# Patient Record
Sex: FEMALE | Race: WHITE | Employment: OTHER | URBAN - METROPOLITAN AREA
[De-identification: names, ages, dates, MRNs, and addresses within clinical notes are randomized per-mention and may not be internally consistent; named-entity substitution may affect disease eponyms.]

---

## 2016-01-01 LAB — HGBA1C: 10 % (ref ?–5.7)

## 2017-01-01 ENCOUNTER — PATIENT OUTREACH (OUTPATIENT)
Dept: CASE MANAGEMENT | Age: 52
End: 2017-01-01

## 2017-01-01 ENCOUNTER — TELEPHONE (OUTPATIENT)
Dept: ENDOCRINOLOGY CLINIC | Facility: CLINIC | Age: 52
End: 2017-01-01

## 2017-01-01 ENCOUNTER — TELEPHONE (OUTPATIENT)
Dept: HEMATOLOGY/ONCOLOGY | Facility: HOSPITAL | Age: 52
End: 2017-01-01

## 2017-01-01 ENCOUNTER — TELEPHONE (OUTPATIENT)
Dept: FAMILY MEDICINE CLINIC | Facility: CLINIC | Age: 52
End: 2017-01-01

## 2017-01-01 ENCOUNTER — APPOINTMENT (OUTPATIENT)
Dept: CT IMAGING | Facility: HOSPITAL | Age: 52
DRG: 375 | End: 2017-01-01
Attending: EMERGENCY MEDICINE
Payer: COMMERCIAL

## 2017-01-01 ENCOUNTER — OFFICE VISIT (OUTPATIENT)
Dept: HEMATOLOGY/ONCOLOGY | Facility: HOSPITAL | Age: 52
End: 2017-01-01
Attending: INTERNAL MEDICINE
Payer: COMMERCIAL

## 2017-01-01 ENCOUNTER — APPOINTMENT (OUTPATIENT)
Dept: MRI IMAGING | Facility: HOSPITAL | Age: 52
DRG: 375 | End: 2017-01-01
Attending: Other
Payer: COMMERCIAL

## 2017-01-01 ENCOUNTER — APPOINTMENT (OUTPATIENT)
Dept: CT IMAGING | Facility: HOSPITAL | Age: 52
DRG: 689 | End: 2017-01-01
Attending: HOSPITALIST
Payer: COMMERCIAL

## 2017-01-01 ENCOUNTER — OFFICE VISIT (OUTPATIENT)
Dept: WOUND CARE | Age: 52
End: 2017-01-01
Attending: NURSE PRACTITIONER
Payer: COMMERCIAL

## 2017-01-01 ENCOUNTER — NURSE ONLY (OUTPATIENT)
Dept: HEMATOLOGY/ONCOLOGY | Age: 52
End: 2017-01-01
Attending: INTERNAL MEDICINE
Payer: COMMERCIAL

## 2017-01-01 ENCOUNTER — TELEPHONE (OUTPATIENT)
Dept: INTERNAL MEDICINE CLINIC | Facility: CLINIC | Age: 52
End: 2017-01-01

## 2017-01-01 ENCOUNTER — APPOINTMENT (OUTPATIENT)
Dept: ULTRASOUND IMAGING | Facility: HOSPITAL | Age: 52
DRG: 689 | End: 2017-01-01
Attending: INTERNAL MEDICINE
Payer: COMMERCIAL

## 2017-01-01 ENCOUNTER — OFFICE VISIT (OUTPATIENT)
Dept: ENDOCRINOLOGY CLINIC | Facility: CLINIC | Age: 52
End: 2017-01-01

## 2017-01-01 ENCOUNTER — SNF VISIT (OUTPATIENT)
Dept: INTERNAL MEDICINE CLINIC | Age: 52
End: 2017-01-01

## 2017-01-01 ENCOUNTER — OFFICE VISIT (OUTPATIENT)
Dept: HEMATOLOGY/ONCOLOGY | Age: 52
End: 2017-01-01
Attending: INTERNAL MEDICINE
Payer: COMMERCIAL

## 2017-01-01 ENCOUNTER — APPOINTMENT (OUTPATIENT)
Dept: GENERAL RADIOLOGY | Facility: HOSPITAL | Age: 52
DRG: 754 | End: 2017-01-01
Attending: EMERGENCY MEDICINE
Payer: COMMERCIAL

## 2017-01-01 ENCOUNTER — APPOINTMENT (OUTPATIENT)
Dept: GENERAL RADIOLOGY | Facility: HOSPITAL | Age: 52
DRG: 689 | End: 2017-01-01
Attending: HOSPITALIST
Payer: COMMERCIAL

## 2017-01-01 ENCOUNTER — SOCIAL WORK SERVICES (OUTPATIENT)
Dept: HEMATOLOGY/ONCOLOGY | Facility: HOSPITAL | Age: 52
End: 2017-01-01

## 2017-01-01 ENCOUNTER — ANESTHESIA EVENT (OUTPATIENT)
Dept: SURGERY | Facility: HOSPITAL | Age: 52
End: 2017-01-01

## 2017-01-01 ENCOUNTER — LAB ENCOUNTER (OUTPATIENT)
Dept: LAB | Age: 52
End: 2017-01-01
Attending: FAMILY MEDICINE
Payer: COMMERCIAL

## 2017-01-01 ENCOUNTER — HOSPITAL ENCOUNTER (INPATIENT)
Facility: HOSPITAL | Age: 52
LOS: 3 days | Discharge: HOME | DRG: 638 | End: 2017-01-01
Attending: STUDENT IN AN ORGANIZED HEALTH CARE EDUCATION/TRAINING PROGRAM | Admitting: STUDENT IN AN ORGANIZED HEALTH CARE EDUCATION/TRAINING PROGRAM
Payer: COMMERCIAL

## 2017-01-01 ENCOUNTER — HOSPITAL ENCOUNTER (INPATIENT)
Facility: HOSPITAL | Age: 52
LOS: 2 days | Discharge: HOME | DRG: 638 | End: 2017-01-01
Attending: EMERGENCY MEDICINE | Admitting: INTERNAL MEDICINE
Payer: COMMERCIAL

## 2017-01-01 ENCOUNTER — APPOINTMENT (OUTPATIENT)
Dept: GENETICS | Facility: HOSPITAL | Age: 52
End: 2017-01-01
Attending: INTERNAL MEDICINE
Payer: COMMERCIAL

## 2017-01-01 ENCOUNTER — NURSE ONLY (OUTPATIENT)
Dept: ENDOCRINOLOGY CLINIC | Facility: CLINIC | Age: 52
End: 2017-01-01

## 2017-01-01 ENCOUNTER — OFFICE VISIT (OUTPATIENT)
Dept: FAMILY MEDICINE CLINIC | Facility: CLINIC | Age: 52
End: 2017-01-01

## 2017-01-01 ENCOUNTER — APPOINTMENT (OUTPATIENT)
Dept: GENERAL RADIOLOGY | Age: 52
DRG: 603 | End: 2017-01-01
Attending: EMERGENCY MEDICINE
Payer: COMMERCIAL

## 2017-01-01 ENCOUNTER — APPOINTMENT (OUTPATIENT)
Dept: ULTRASOUND IMAGING | Facility: HOSPITAL | Age: 52
DRG: 375 | End: 2017-01-01
Attending: INTERNAL MEDICINE
Payer: COMMERCIAL

## 2017-01-01 ENCOUNTER — HOSPITAL ENCOUNTER (INPATIENT)
Facility: HOSPITAL | Age: 52
LOS: 10 days | Discharge: NURSING, REHAB OR LONG-TERM CARE | DRG: 375 | End: 2017-01-01
Attending: EMERGENCY MEDICINE | Admitting: HOSPITALIST
Payer: COMMERCIAL

## 2017-01-01 ENCOUNTER — APPOINTMENT (OUTPATIENT)
Dept: WOUND CARE | Facility: HOSPITAL | Age: 52
End: 2017-01-01
Attending: PODIATRIST
Payer: COMMERCIAL

## 2017-01-01 ENCOUNTER — GENETICS ENCOUNTER (OUTPATIENT)
Dept: HEMATOLOGY/ONCOLOGY | Facility: HOSPITAL | Age: 52
End: 2017-01-01

## 2017-01-01 ENCOUNTER — APPOINTMENT (OUTPATIENT)
Dept: MRI IMAGING | Facility: HOSPITAL | Age: 52
DRG: 638 | End: 2017-01-01
Attending: STUDENT IN AN ORGANIZED HEALTH CARE EDUCATION/TRAINING PROGRAM
Payer: COMMERCIAL

## 2017-01-01 ENCOUNTER — APPOINTMENT (OUTPATIENT)
Dept: ULTRASOUND IMAGING | Facility: HOSPITAL | Age: 52
DRG: 375 | End: 2017-01-01
Attending: HOSPITALIST
Payer: COMMERCIAL

## 2017-01-01 ENCOUNTER — HOSPITAL ENCOUNTER (INPATIENT)
Facility: HOSPITAL | Age: 52
LOS: 1 days | DRG: 754 | End: 2017-01-01
Attending: INTERNAL MEDICINE | Admitting: INTERNAL MEDICINE
Payer: OTHER MISCELLANEOUS

## 2017-01-01 ENCOUNTER — NURSE ONLY (OUTPATIENT)
Dept: LAB | Age: 52
End: 2017-01-01
Attending: FAMILY MEDICINE
Payer: COMMERCIAL

## 2017-01-01 ENCOUNTER — APPOINTMENT (OUTPATIENT)
Dept: GENERAL RADIOLOGY | Facility: HOSPITAL | Age: 52
DRG: 638 | End: 2017-01-01
Attending: EMERGENCY MEDICINE
Payer: COMMERCIAL

## 2017-01-01 ENCOUNTER — HOSPITAL ENCOUNTER (OUTPATIENT)
Dept: CT IMAGING | Age: 52
Discharge: HOME | End: 2017-01-01
Attending: CLINICAL NURSE SPECIALIST
Payer: COMMERCIAL

## 2017-01-01 ENCOUNTER — SNF ADMIT/H&P (OUTPATIENT)
Dept: FAMILY MEDICINE CLINIC | Facility: CLINIC | Age: 52
End: 2017-01-01

## 2017-01-01 ENCOUNTER — SURGERY (OUTPATIENT)
Age: 52
End: 2017-01-01

## 2017-01-01 ENCOUNTER — HOSPITAL ENCOUNTER (INPATIENT)
Facility: HOSPITAL | Age: 52
LOS: 1 days | Discharge: HOSPICE | DRG: 754 | End: 2017-01-01
Attending: EMERGENCY MEDICINE | Admitting: INTERNAL MEDICINE
Payer: COMMERCIAL

## 2017-01-01 ENCOUNTER — APPOINTMENT (OUTPATIENT)
Dept: GENERAL RADIOLOGY | Facility: HOSPITAL | Age: 52
DRG: 689 | End: 2017-01-01
Attending: EMERGENCY MEDICINE
Payer: COMMERCIAL

## 2017-01-01 ENCOUNTER — TELEPHONE (OUTPATIENT)
Dept: INFECTIOUS DISEASE | Facility: CLINIC | Age: 52
End: 2017-01-01

## 2017-01-01 ENCOUNTER — APPOINTMENT (OUTPATIENT)
Dept: ULTRASOUND IMAGING | Facility: HOSPITAL | Age: 52
DRG: 689 | End: 2017-01-01
Attending: SURGERY
Payer: COMMERCIAL

## 2017-01-01 ENCOUNTER — APPOINTMENT (OUTPATIENT)
Dept: ULTRASOUND IMAGING | Facility: HOSPITAL | Age: 52
DRG: 689 | End: 2017-01-01
Attending: PHYSICIAN ASSISTANT
Payer: COMMERCIAL

## 2017-01-01 ENCOUNTER — ANESTHESIA (OUTPATIENT)
Dept: SURGERY | Facility: HOSPITAL | Age: 52
End: 2017-01-01

## 2017-01-01 ENCOUNTER — HOSPITAL ENCOUNTER (INPATIENT)
Facility: HOSPITAL | Age: 52
LOS: 1 days | Discharge: HOME | DRG: 603 | End: 2017-01-01
Attending: EMERGENCY MEDICINE | Admitting: HOSPITALIST
Payer: COMMERCIAL

## 2017-01-01 ENCOUNTER — APPOINTMENT (OUTPATIENT)
Dept: CT IMAGING | Facility: HOSPITAL | Age: 52
DRG: 638 | End: 2017-01-01
Attending: EMERGENCY MEDICINE
Payer: COMMERCIAL

## 2017-01-01 ENCOUNTER — APPOINTMENT (OUTPATIENT)
Dept: HEMATOLOGY/ONCOLOGY | Age: 52
End: 2017-01-01
Attending: INTERNAL MEDICINE
Payer: COMMERCIAL

## 2017-01-01 ENCOUNTER — HOSPITAL ENCOUNTER (INPATIENT)
Facility: HOSPITAL | Age: 52
LOS: 5 days | Discharge: NURSING, REHAB OR LONG-TERM CARE | DRG: 689 | End: 2017-01-01
Attending: EMERGENCY MEDICINE | Admitting: HOSPITALIST
Payer: COMMERCIAL

## 2017-01-01 VITALS
SYSTOLIC BLOOD PRESSURE: 96 MMHG | DIASTOLIC BLOOD PRESSURE: 59 MMHG | RESPIRATION RATE: 16 BRPM | OXYGEN SATURATION: 97 % | TEMPERATURE: 99 F | HEART RATE: 91 BPM

## 2017-01-01 VITALS
SYSTOLIC BLOOD PRESSURE: 136 MMHG | HEART RATE: 88 BPM | BODY MASS INDEX: 40.57 KG/M2 | DIASTOLIC BLOOD PRESSURE: 78 MMHG | WEIGHT: 229 LBS | HEIGHT: 63 IN

## 2017-01-01 VITALS
OXYGEN SATURATION: 98 % | DIASTOLIC BLOOD PRESSURE: 78 MMHG | BODY MASS INDEX: 35 KG/M2 | RESPIRATION RATE: 20 BRPM | SYSTOLIC BLOOD PRESSURE: 127 MMHG | HEART RATE: 91 BPM | TEMPERATURE: 98 F | WEIGHT: 200 LBS

## 2017-01-01 VITALS
HEIGHT: 63 IN | TEMPERATURE: 99 F | WEIGHT: 211.88 LBS | RESPIRATION RATE: 20 BRPM | OXYGEN SATURATION: 99 % | SYSTOLIC BLOOD PRESSURE: 131 MMHG | DIASTOLIC BLOOD PRESSURE: 74 MMHG | HEART RATE: 102 BPM | BODY MASS INDEX: 37.54 KG/M2

## 2017-01-01 VITALS
WEIGHT: 203.88 LBS | DIASTOLIC BLOOD PRESSURE: 75 MMHG | DIASTOLIC BLOOD PRESSURE: 72 MMHG | HEART RATE: 87 BPM | WEIGHT: 202.63 LBS | BODY MASS INDEX: 36.12 KG/M2 | RESPIRATION RATE: 18 BRPM | TEMPERATURE: 98 F | TEMPERATURE: 99 F | OXYGEN SATURATION: 100 % | SYSTOLIC BLOOD PRESSURE: 121 MMHG | HEIGHT: 63 IN | RESPIRATION RATE: 20 BRPM | BODY MASS INDEX: 37 KG/M2 | HEART RATE: 109 BPM | OXYGEN SATURATION: 97 % | SYSTOLIC BLOOD PRESSURE: 134 MMHG

## 2017-01-01 VITALS
DIASTOLIC BLOOD PRESSURE: 67 MMHG | OXYGEN SATURATION: 96 % | RESPIRATION RATE: 20 BRPM | SYSTOLIC BLOOD PRESSURE: 113 MMHG | HEART RATE: 97 BPM | TEMPERATURE: 98 F

## 2017-01-01 VITALS
DIASTOLIC BLOOD PRESSURE: 60 MMHG | BODY MASS INDEX: 34.02 KG/M2 | HEIGHT: 63 IN | WEIGHT: 192 LBS | SYSTOLIC BLOOD PRESSURE: 141 MMHG | TEMPERATURE: 97 F | HEART RATE: 107 BPM

## 2017-01-01 VITALS
BODY MASS INDEX: 35.61 KG/M2 | DIASTOLIC BLOOD PRESSURE: 62 MMHG | TEMPERATURE: 98 F | HEIGHT: 63 IN | RESPIRATION RATE: 16 BRPM | HEART RATE: 72 BPM | SYSTOLIC BLOOD PRESSURE: 110 MMHG | WEIGHT: 201 LBS

## 2017-01-01 VITALS
SYSTOLIC BLOOD PRESSURE: 110 MMHG | BODY MASS INDEX: 38.62 KG/M2 | DIASTOLIC BLOOD PRESSURE: 60 MMHG | HEART RATE: 72 BPM | HEIGHT: 63 IN | TEMPERATURE: 98 F | WEIGHT: 218 LBS | RESPIRATION RATE: 18 BRPM

## 2017-01-01 VITALS — DIASTOLIC BLOOD PRESSURE: 74 MMHG | WEIGHT: 208 LBS | SYSTOLIC BLOOD PRESSURE: 130 MMHG | BODY MASS INDEX: 37 KG/M2

## 2017-01-01 VITALS
TEMPERATURE: 99 F | RESPIRATION RATE: 18 BRPM | HEIGHT: 62.99 IN | DIASTOLIC BLOOD PRESSURE: 75 MMHG | HEART RATE: 99 BPM | SYSTOLIC BLOOD PRESSURE: 109 MMHG | BODY MASS INDEX: 39.45 KG/M2 | OXYGEN SATURATION: 98 % | WEIGHT: 222.63 LBS

## 2017-01-01 VITALS
HEART RATE: 51 BPM | DIASTOLIC BLOOD PRESSURE: 68 MMHG | BODY MASS INDEX: 41.59 KG/M2 | SYSTOLIC BLOOD PRESSURE: 128 MMHG | OXYGEN SATURATION: 96 % | HEIGHT: 62 IN | WEIGHT: 226 LBS | RESPIRATION RATE: 16 BRPM

## 2017-01-01 VITALS
RESPIRATION RATE: 18 BRPM | TEMPERATURE: 98 F | WEIGHT: 226 LBS | DIASTOLIC BLOOD PRESSURE: 50 MMHG | HEART RATE: 68 BPM | HEIGHT: 63 IN | BODY MASS INDEX: 40.04 KG/M2 | SYSTOLIC BLOOD PRESSURE: 90 MMHG

## 2017-01-01 VITALS
BODY MASS INDEX: 39.93 KG/M2 | OXYGEN SATURATION: 99 % | DIASTOLIC BLOOD PRESSURE: 82 MMHG | WEIGHT: 225.38 LBS | TEMPERATURE: 99 F | RESPIRATION RATE: 20 BRPM | SYSTOLIC BLOOD PRESSURE: 129 MMHG | HEART RATE: 105 BPM | HEIGHT: 62.99 IN

## 2017-01-01 VITALS — WEIGHT: 205.81 LBS | BODY MASS INDEX: 36 KG/M2

## 2017-01-01 VITALS
WEIGHT: 206 LBS | DIASTOLIC BLOOD PRESSURE: 53 MMHG | OXYGEN SATURATION: 93 % | HEART RATE: 97 BPM | BODY MASS INDEX: 36 KG/M2 | SYSTOLIC BLOOD PRESSURE: 112 MMHG

## 2017-01-01 VITALS
TEMPERATURE: 98 F | DIASTOLIC BLOOD PRESSURE: 60 MMHG | WEIGHT: 228 LBS | HEIGHT: 63 IN | HEART RATE: 68 BPM | SYSTOLIC BLOOD PRESSURE: 122 MMHG | BODY MASS INDEX: 40.4 KG/M2 | RESPIRATION RATE: 18 BRPM

## 2017-01-01 VITALS
TEMPERATURE: 99 F | HEIGHT: 63 IN | WEIGHT: 219 LBS | RESPIRATION RATE: 18 BRPM | HEART RATE: 68 BPM | DIASTOLIC BLOOD PRESSURE: 50 MMHG | SYSTOLIC BLOOD PRESSURE: 110 MMHG | BODY MASS INDEX: 38.8 KG/M2

## 2017-01-01 VITALS
WEIGHT: 209 LBS | BODY MASS INDEX: 37.03 KG/M2 | DIASTOLIC BLOOD PRESSURE: 66 MMHG | RESPIRATION RATE: 16 BRPM | HEIGHT: 63 IN | HEART RATE: 105 BPM | SYSTOLIC BLOOD PRESSURE: 119 MMHG | TEMPERATURE: 98 F

## 2017-01-01 VITALS
DIASTOLIC BLOOD PRESSURE: 81 MMHG | RESPIRATION RATE: 20 BRPM | TEMPERATURE: 98 F | WEIGHT: 224 LBS | BODY MASS INDEX: 40 KG/M2 | SYSTOLIC BLOOD PRESSURE: 141 MMHG | HEART RATE: 103 BPM

## 2017-01-01 VITALS
BODY MASS INDEX: 37 KG/M2 | WEIGHT: 199.63 LBS | TEMPERATURE: 98 F | OXYGEN SATURATION: 98 % | RESPIRATION RATE: 2 BRPM | HEART RATE: 103 BPM | DIASTOLIC BLOOD PRESSURE: 79 MMHG | SYSTOLIC BLOOD PRESSURE: 135 MMHG

## 2017-01-01 VITALS
RESPIRATION RATE: 18 BRPM | TEMPERATURE: 98 F | HEART RATE: 95 BPM | WEIGHT: 209.63 LBS | OXYGEN SATURATION: 90 % | BODY MASS INDEX: 37.14 KG/M2 | DIASTOLIC BLOOD PRESSURE: 60 MMHG | HEIGHT: 63 IN | SYSTOLIC BLOOD PRESSURE: 99 MMHG

## 2017-01-01 VITALS
WEIGHT: 204.38 LBS | HEART RATE: 111 BPM | TEMPERATURE: 98 F | RESPIRATION RATE: 20 BRPM | SYSTOLIC BLOOD PRESSURE: 129 MMHG | DIASTOLIC BLOOD PRESSURE: 70 MMHG | OXYGEN SATURATION: 98 % | BODY MASS INDEX: 36.21 KG/M2 | HEIGHT: 63 IN

## 2017-01-01 VITALS
OXYGEN SATURATION: 98 % | RESPIRATION RATE: 20 BRPM | DIASTOLIC BLOOD PRESSURE: 83 MMHG | BODY MASS INDEX: 39 KG/M2 | SYSTOLIC BLOOD PRESSURE: 145 MMHG | HEART RATE: 80 BPM | TEMPERATURE: 98 F | WEIGHT: 221.63 LBS

## 2017-01-01 VITALS
HEART RATE: 94 BPM | OXYGEN SATURATION: 95 % | WEIGHT: 208.63 LBS | RESPIRATION RATE: 18 BRPM | DIASTOLIC BLOOD PRESSURE: 49 MMHG | TEMPERATURE: 98 F | BODY MASS INDEX: 36.96 KG/M2 | SYSTOLIC BLOOD PRESSURE: 92 MMHG | HEIGHT: 63 IN

## 2017-01-01 VITALS
TEMPERATURE: 99 F | HEART RATE: 103 BPM | HEIGHT: 63 IN | BODY MASS INDEX: 36.18 KG/M2 | WEIGHT: 204.19 LBS | DIASTOLIC BLOOD PRESSURE: 74 MMHG | SYSTOLIC BLOOD PRESSURE: 122 MMHG

## 2017-01-01 VITALS
HEART RATE: 98 BPM | SYSTOLIC BLOOD PRESSURE: 89 MMHG | HEIGHT: 64 IN | RESPIRATION RATE: 18 BRPM | TEMPERATURE: 98 F | DIASTOLIC BLOOD PRESSURE: 51 MMHG | BODY MASS INDEX: 35.87 KG/M2 | OXYGEN SATURATION: 92 % | WEIGHT: 210.13 LBS

## 2017-01-01 VITALS
DIASTOLIC BLOOD PRESSURE: 75 MMHG | SYSTOLIC BLOOD PRESSURE: 121 MMHG | HEART RATE: 94 BPM | BODY MASS INDEX: 40 KG/M2 | WEIGHT: 225.38 LBS | TEMPERATURE: 99 F | RESPIRATION RATE: 18 BRPM

## 2017-01-01 VITALS
WEIGHT: 219 LBS | DIASTOLIC BLOOD PRESSURE: 80 MMHG | RESPIRATION RATE: 18 BRPM | TEMPERATURE: 98 F | OXYGEN SATURATION: 99 % | SYSTOLIC BLOOD PRESSURE: 140 MMHG | BODY MASS INDEX: 39 KG/M2 | HEART RATE: 78 BPM

## 2017-01-01 VITALS
SYSTOLIC BLOOD PRESSURE: 128 MMHG | BODY MASS INDEX: 37 KG/M2 | DIASTOLIC BLOOD PRESSURE: 77 MMHG | OXYGEN SATURATION: 98 % | HEART RATE: 101 BPM | RESPIRATION RATE: 20 BRPM | TEMPERATURE: 99 F | WEIGHT: 209.5 LBS

## 2017-01-01 VITALS — WEIGHT: 210 LBS | BODY MASS INDEX: 37.21 KG/M2 | HEIGHT: 63 IN

## 2017-01-01 VITALS
TEMPERATURE: 97 F | HEART RATE: 87 BPM | RESPIRATION RATE: 20 BRPM | SYSTOLIC BLOOD PRESSURE: 108 MMHG | WEIGHT: 226.38 LBS | BODY MASS INDEX: 41 KG/M2 | DIASTOLIC BLOOD PRESSURE: 75 MMHG

## 2017-01-01 VITALS
WEIGHT: 204 LBS | BODY MASS INDEX: 36.14 KG/M2 | HEIGHT: 63 IN | HEART RATE: 96 BPM | TEMPERATURE: 99 F | DIASTOLIC BLOOD PRESSURE: 50 MMHG | RESPIRATION RATE: 18 BRPM | SYSTOLIC BLOOD PRESSURE: 102 MMHG

## 2017-01-01 VITALS
HEART RATE: 102 BPM | OXYGEN SATURATION: 90 % | SYSTOLIC BLOOD PRESSURE: 71 MMHG | RESPIRATION RATE: 19 BRPM | DIASTOLIC BLOOD PRESSURE: 46 MMHG | TEMPERATURE: 98 F

## 2017-01-01 VITALS — DIASTOLIC BLOOD PRESSURE: 62 MMHG | WEIGHT: 231.5 LBS | BODY MASS INDEX: 42 KG/M2 | SYSTOLIC BLOOD PRESSURE: 140 MMHG

## 2017-01-01 VITALS
HEIGHT: 63 IN | RESPIRATION RATE: 18 BRPM | WEIGHT: 210.81 LBS | HEART RATE: 86 BPM | BODY MASS INDEX: 37.35 KG/M2 | SYSTOLIC BLOOD PRESSURE: 130 MMHG | DIASTOLIC BLOOD PRESSURE: 72 MMHG

## 2017-01-01 VITALS
SYSTOLIC BLOOD PRESSURE: 100 MMHG | DIASTOLIC BLOOD PRESSURE: 64 MMHG | OXYGEN SATURATION: 98 % | HEART RATE: 100 BPM | RESPIRATION RATE: 22 BRPM

## 2017-01-01 VITALS
HEART RATE: 88 BPM | DIASTOLIC BLOOD PRESSURE: 70 MMHG | BODY MASS INDEX: 39.49 KG/M2 | SYSTOLIC BLOOD PRESSURE: 120 MMHG | WEIGHT: 214.63 LBS | HEIGHT: 62 IN

## 2017-01-01 VITALS
RESPIRATION RATE: 20 BRPM | OXYGEN SATURATION: 98 % | WEIGHT: 212 LBS | SYSTOLIC BLOOD PRESSURE: 109 MMHG | DIASTOLIC BLOOD PRESSURE: 67 MMHG | BODY MASS INDEX: 39 KG/M2 | TEMPERATURE: 98 F | HEART RATE: 99 BPM

## 2017-01-01 VITALS
BODY MASS INDEX: 38 KG/M2 | TEMPERATURE: 98 F | RESPIRATION RATE: 18 BRPM | WEIGHT: 211.69 LBS | OXYGEN SATURATION: 97 % | DIASTOLIC BLOOD PRESSURE: 87 MMHG | HEART RATE: 86 BPM | SYSTOLIC BLOOD PRESSURE: 161 MMHG

## 2017-01-01 VITALS
TEMPERATURE: 98 F | SYSTOLIC BLOOD PRESSURE: 110 MMHG | BODY MASS INDEX: 38.46 KG/M2 | HEART RATE: 92 BPM | RESPIRATION RATE: 18 BRPM | WEIGHT: 209 LBS | DIASTOLIC BLOOD PRESSURE: 62 MMHG | HEIGHT: 62 IN

## 2017-01-01 DIAGNOSIS — E11.65 TYPE 2 DIABETES MELLITUS WITH HYPERGLYCEMIA, WITH LONG-TERM CURRENT USE OF INSULIN (HCC): Primary | ICD-10-CM

## 2017-01-01 DIAGNOSIS — C78.7 LIVER METASTASES (HCC): Primary | ICD-10-CM

## 2017-01-01 DIAGNOSIS — Z79.4 TYPE 2 DIABETES MELLITUS WITH BOTH EYES AFFECTED BY MODERATE NONPROLIFERATIVE RETINOPATHY WITHOUT MACULAR EDEMA, WITH LONG-TERM CURRENT USE OF INSULIN (HCC): Primary | ICD-10-CM

## 2017-01-01 DIAGNOSIS — L03.119 TYPE 2 DIABETES, UNCONTROLLED, WITH CELLULITIS OF FOOT (HCC): ICD-10-CM

## 2017-01-01 DIAGNOSIS — Z79.4 TYPE 2 DIABETES MELLITUS WITH BOTH EYES AFFECTED BY MODERATE NONPROLIFERATIVE RETINOPATHY WITHOUT MACULAR EDEMA, WITH LONG-TERM CURRENT USE OF INSULIN (HCC): ICD-10-CM

## 2017-01-01 DIAGNOSIS — Z96.41 INSULIN PUMP IN PLACE: ICD-10-CM

## 2017-01-01 DIAGNOSIS — C78.6 PERITONEAL METASTASES (HCC): ICD-10-CM

## 2017-01-01 DIAGNOSIS — L03.116 CELLULITIS OF LEFT LOWER EXTREMITY: Primary | ICD-10-CM

## 2017-01-01 DIAGNOSIS — Z71.89 GOALS OF CARE, COUNSELING/DISCUSSION: ICD-10-CM

## 2017-01-01 DIAGNOSIS — E11.42 DIABETIC PERIPHERAL NEUROPATHY (HCC): ICD-10-CM

## 2017-01-01 DIAGNOSIS — E11.3393 TYPE 2 DIABETES MELLITUS WITH BOTH EYES AFFECTED BY MODERATE NONPROLIFERATIVE RETINOPATHY WITHOUT MACULAR EDEMA, WITH LONG-TERM CURRENT USE OF INSULIN (HCC): ICD-10-CM

## 2017-01-01 DIAGNOSIS — E11.9 DM (DIABETES MELLITUS) (HCC): Primary | ICD-10-CM

## 2017-01-01 DIAGNOSIS — E78.2 MIXED HYPERLIPIDEMIA: ICD-10-CM

## 2017-01-01 DIAGNOSIS — E78.6 LOW HDL (UNDER 40): ICD-10-CM

## 2017-01-01 DIAGNOSIS — E11.65 TYPE 2 DIABETES, UNCONTROLLED, WITH CELLULITIS OF FOOT (HCC): Primary | ICD-10-CM

## 2017-01-01 DIAGNOSIS — S91.301S UNSPECIFIED OPEN WOUND, RIGHT FOOT, SEQUELA: Primary | ICD-10-CM

## 2017-01-01 DIAGNOSIS — T45.1X5A CHEMOTHERAPY-INDUCED PERIPHERAL NEUROPATHY (HCC): ICD-10-CM

## 2017-01-01 DIAGNOSIS — Q23.1 BICUSPID AORTIC VALVE: ICD-10-CM

## 2017-01-01 DIAGNOSIS — C78.7 LIVER METASTASES (HCC): ICD-10-CM

## 2017-01-01 DIAGNOSIS — E22.2 SIADH (SYNDROME OF INAPPROPRIATE ADH PRODUCTION) (HCC): ICD-10-CM

## 2017-01-01 DIAGNOSIS — Z15.01 BRCA1 POSITIVE: Primary | ICD-10-CM

## 2017-01-01 DIAGNOSIS — Z66 DNR (DO NOT RESUSCITATE): ICD-10-CM

## 2017-01-01 DIAGNOSIS — T45.1X5A PERIPHERAL NEUROPATHY DUE TO CHEMOTHERAPY (HCC): ICD-10-CM

## 2017-01-01 DIAGNOSIS — C56.9 OVARIAN CANCER, UNSPECIFIED LATERALITY (HCC): ICD-10-CM

## 2017-01-01 DIAGNOSIS — C56.9 OVARIAN CANCER, UNSPECIFIED LATERALITY (HCC): Primary | ICD-10-CM

## 2017-01-01 DIAGNOSIS — E11.628 TYPE 2 DIABETES, UNCONTROLLED, WITH CELLULITIS OF FOOT (HCC): Primary | ICD-10-CM

## 2017-01-01 DIAGNOSIS — T50.905D MEDICATION ADVERSE EFFECT, SUBSEQUENT ENCOUNTER: ICD-10-CM

## 2017-01-01 DIAGNOSIS — I95.9 HYPOTENSION, UNSPECIFIED HYPOTENSION TYPE: ICD-10-CM

## 2017-01-01 DIAGNOSIS — Z90.79 STATUS POST TOTAL ABDOMINAL HYSTERECTOMY AND BILATERAL SALPINGO-OOPHORECTOMY (TAH-BSO): ICD-10-CM

## 2017-01-01 DIAGNOSIS — Z02.9 ENCOUNTERS FOR ADMINISTRATIVE PURPOSE: ICD-10-CM

## 2017-01-01 DIAGNOSIS — E11.628 DIABETES WITH SKIN COMPLICATION (HCC): ICD-10-CM

## 2017-01-01 DIAGNOSIS — D64.9 ANEMIA, UNSPECIFIED TYPE: ICD-10-CM

## 2017-01-01 DIAGNOSIS — E11.628 TYPE 2 DIABETES, UNCONTROLLED, WITH CELLULITIS OF FOOT (HCC): ICD-10-CM

## 2017-01-01 DIAGNOSIS — R53.83 MALAISE AND FATIGUE: ICD-10-CM

## 2017-01-01 DIAGNOSIS — R63.4 UNINTENTIONAL WEIGHT LOSS: ICD-10-CM

## 2017-01-01 DIAGNOSIS — E11.65 TYPE 2 DIABETES, UNCONTROLLED, WITH CELLULITIS OF FOOT (HCC): ICD-10-CM

## 2017-01-01 DIAGNOSIS — E11.8 TYPE 2 DIABETES MELLITUS WITH COMPLICATION, WITH LONG-TERM CURRENT USE OF INSULIN (HCC): ICD-10-CM

## 2017-01-01 DIAGNOSIS — R53.81 MALAISE AND FATIGUE: ICD-10-CM

## 2017-01-01 DIAGNOSIS — Z15.09 BRCA1 POSITIVE: Primary | ICD-10-CM

## 2017-01-01 DIAGNOSIS — R18.0 MALIGNANT ASCITES: ICD-10-CM

## 2017-01-01 DIAGNOSIS — Z15.09 BRCA1 POSITIVE: ICD-10-CM

## 2017-01-01 DIAGNOSIS — E11.3393 TYPE 2 DIABETES MELLITUS WITH BOTH EYES AFFECTED BY MODERATE NONPROLIFERATIVE RETINOPATHY WITHOUT MACULAR EDEMA, WITH LONG-TERM CURRENT USE OF INSULIN (HCC): Primary | ICD-10-CM

## 2017-01-01 DIAGNOSIS — Z15.01 BRCA1 POSITIVE: ICD-10-CM

## 2017-01-01 DIAGNOSIS — N17.9 AKI (ACUTE KIDNEY INJURY) (HCC): ICD-10-CM

## 2017-01-01 DIAGNOSIS — C56.9 OVARIAN CANCER (HCC): Primary | ICD-10-CM

## 2017-01-01 DIAGNOSIS — F41.9 ANXIETY: ICD-10-CM

## 2017-01-01 DIAGNOSIS — Z79.4 TYPE 2 DIABETES MELLITUS WITH HYPERGLYCEMIA, WITH LONG-TERM CURRENT USE OF INSULIN (HCC): ICD-10-CM

## 2017-01-01 DIAGNOSIS — G62.0 CHEMOTHERAPY-INDUCED PERIPHERAL NEUROPATHY (HCC): Primary | ICD-10-CM

## 2017-01-01 DIAGNOSIS — Z79.4 TYPE 2 DIABETES MELLITUS WITH COMPLICATION, WITH LONG-TERM CURRENT USE OF INSULIN (HCC): Primary | ICD-10-CM

## 2017-01-01 DIAGNOSIS — Z15.09 OVARIAN CANCER, BRCA1 POSITIVE, UNSPECIFIED LATERALITY (HCC): ICD-10-CM

## 2017-01-01 DIAGNOSIS — T45.1X5A CHEMOTHERAPY-INDUCED PERIPHERAL NEUROPATHY (HCC): Primary | ICD-10-CM

## 2017-01-01 DIAGNOSIS — C56.9 OVARIAN CANCER, BRCA1 POSITIVE, UNSPECIFIED LATERALITY (HCC): ICD-10-CM

## 2017-01-01 DIAGNOSIS — B35.3 TINEA PEDIS OF LEFT FOOT: ICD-10-CM

## 2017-01-01 DIAGNOSIS — R60.0 LOCALIZED EDEMA: ICD-10-CM

## 2017-01-01 DIAGNOSIS — G62.0 PERIPHERAL NEUROPATHY DUE TO CHEMOTHERAPY (HCC): ICD-10-CM

## 2017-01-01 DIAGNOSIS — E11.10 DIABETIC KETOACIDOSIS WITHOUT COMA ASSOCIATED WITH TYPE 2 DIABETES MELLITUS (HCC): Primary | ICD-10-CM

## 2017-01-01 DIAGNOSIS — T45.1X5D CHEMOTHERAPY ADVERSE REACTION, SUBSEQUENT ENCOUNTER: ICD-10-CM

## 2017-01-01 DIAGNOSIS — E11.3393 MODERATE NONPROLIFERATIVE DIABETIC RETINOPATHY OF BOTH EYES WITHOUT MACULAR EDEMA ASSOCIATED WITH TYPE 2 DIABETES MELLITUS (HCC): Primary | ICD-10-CM

## 2017-01-01 DIAGNOSIS — E11.65 TYPE 2 DIABETES MELLITUS WITH HYPERGLYCEMIA, WITH LONG-TERM CURRENT USE OF INSULIN (HCC): ICD-10-CM

## 2017-01-01 DIAGNOSIS — T45.1X5A CHEMOTHERAPY ADVERSE REACTION, INITIAL ENCOUNTER: ICD-10-CM

## 2017-01-01 DIAGNOSIS — D75.839 THROMBOCYTOSIS: ICD-10-CM

## 2017-01-01 DIAGNOSIS — E86.0 DEHYDRATION: ICD-10-CM

## 2017-01-01 DIAGNOSIS — R35.89 POLYURIA: ICD-10-CM

## 2017-01-01 DIAGNOSIS — E11.3311 MODERATE NONPROLIFERATIVE DIABETIC RETINOPATHY OF RIGHT EYE WITH MACULAR EDEMA ASSOCIATED WITH TYPE 2 DIABETES MELLITUS (HCC): Primary | ICD-10-CM

## 2017-01-01 DIAGNOSIS — C80.0 CARCINOMATOSIS (HCC): ICD-10-CM

## 2017-01-01 DIAGNOSIS — Z85.43 HISTORY OF OVARIAN CANCER: Primary | ICD-10-CM

## 2017-01-01 DIAGNOSIS — G62.9 NEUROPATHY: ICD-10-CM

## 2017-01-01 DIAGNOSIS — E11.8 TYPE 2 DIABETES MELLITUS WITH COMPLICATION, WITH LONG-TERM CURRENT USE OF INSULIN (HCC): Primary | ICD-10-CM

## 2017-01-01 DIAGNOSIS — N39.3 STRESS INCONTINENCE: ICD-10-CM

## 2017-01-01 DIAGNOSIS — Z85.43 HISTORY OF OVARIAN CANCER: ICD-10-CM

## 2017-01-01 DIAGNOSIS — R11.0 NAUSEA: ICD-10-CM

## 2017-01-01 DIAGNOSIS — L03.119 TYPE 2 DIABETES, UNCONTROLLED, WITH CELLULITIS OF FOOT (HCC): Primary | ICD-10-CM

## 2017-01-01 DIAGNOSIS — G62.0 CHEMOTHERAPY-INDUCED PERIPHERAL NEUROPATHY (HCC): ICD-10-CM

## 2017-01-01 DIAGNOSIS — C80.0 CARCINOMATOSIS (HCC): Primary | ICD-10-CM

## 2017-01-01 DIAGNOSIS — Z79.4 TYPE 2 DIABETES MELLITUS WITH COMPLICATION, WITH LONG-TERM CURRENT USE OF INSULIN (HCC): ICD-10-CM

## 2017-01-01 DIAGNOSIS — E63.9 VERY POOR NUTRITION: ICD-10-CM

## 2017-01-01 DIAGNOSIS — N28.9 RENAL INSUFFICIENCY: ICD-10-CM

## 2017-01-01 DIAGNOSIS — C56.9 OVARIAN CARCINOMA, UNSPECIFIED LATERALITY (HCC): ICD-10-CM

## 2017-01-01 DIAGNOSIS — G89.4 CHRONIC PAIN SYNDROME: ICD-10-CM

## 2017-01-01 DIAGNOSIS — E87.1 HYPONATREMIA: ICD-10-CM

## 2017-01-01 DIAGNOSIS — E78.5 DYSLIPIDEMIA: ICD-10-CM

## 2017-01-01 DIAGNOSIS — Z79.4 TYPE 2 DIABETES MELLITUS WITH HYPERGLYCEMIA, WITH LONG-TERM CURRENT USE OF INSULIN (HCC): Primary | ICD-10-CM

## 2017-01-01 DIAGNOSIS — N30.01 ACUTE CYSTITIS WITH HEMATURIA: ICD-10-CM

## 2017-01-01 DIAGNOSIS — I10 BENIGN ESSENTIAL HTN: ICD-10-CM

## 2017-01-01 DIAGNOSIS — E11.42 DIABETIC PERIPHERAL NEUROPATHY (HCC): Primary | ICD-10-CM

## 2017-01-01 DIAGNOSIS — Z90.710 STATUS POST TOTAL ABDOMINAL HYSTERECTOMY AND BILATERAL SALPINGO-OOPHORECTOMY (TAH-BSO): ICD-10-CM

## 2017-01-01 DIAGNOSIS — E66.01 MORBID OBESITY WITH BMI OF 40.0-44.9, ADULT (HCC): ICD-10-CM

## 2017-01-01 DIAGNOSIS — I15.9 SECONDARY HYPERTENSION: ICD-10-CM

## 2017-01-01 DIAGNOSIS — R73.9 HYPERGLYCEMIA: ICD-10-CM

## 2017-01-01 DIAGNOSIS — E86.0 DEHYDRATION: Primary | ICD-10-CM

## 2017-01-01 DIAGNOSIS — R40.0 SOMNOLENCE: ICD-10-CM

## 2017-01-01 DIAGNOSIS — Z90.722 STATUS POST TOTAL ABDOMINAL HYSTERECTOMY AND BILATERAL SALPINGO-OOPHORECTOMY (TAH-BSO): ICD-10-CM

## 2017-01-01 DIAGNOSIS — Z15.01 OVARIAN CANCER, BRCA1 POSITIVE, UNSPECIFIED LATERALITY (HCC): ICD-10-CM

## 2017-01-01 DIAGNOSIS — R10.9 ABDOMINAL PAIN, ACUTE: Primary | ICD-10-CM

## 2017-01-01 DIAGNOSIS — E87.5 HYPERKALEMIA: ICD-10-CM

## 2017-01-01 DIAGNOSIS — L03.116 CELLULITIS OF LEFT LOWER EXTREMITY: ICD-10-CM

## 2017-01-01 DIAGNOSIS — Z51.5 PALLIATIVE CARE ENCOUNTER: ICD-10-CM

## 2017-01-01 DIAGNOSIS — R11.2 NON-INTRACTABLE VOMITING WITH NAUSEA, UNSPECIFIED VOMITING TYPE: Primary | ICD-10-CM

## 2017-01-01 DIAGNOSIS — C56.9 MALIGNANT NEOPLASM OF OVARY, UNSPECIFIED LATERALITY (HCC): Primary | ICD-10-CM

## 2017-01-01 LAB
ALBUMIN SERPL-MCNC: 3.2 G/DL (ref 3.5–4.8)
ALBUMIN SERPL-MCNC: 3.3 G/DL (ref 3.5–4.8)
ALBUMIN SERPL-MCNC: 3.4 G/DL (ref 3.5–4.8)
ALBUMIN SERPL-MCNC: 3.4 G/DL (ref 3.5–4.8)
ALBUMIN SERPL-MCNC: 3.5 G/DL (ref 3.5–4.8)
ALBUMIN SERPL-MCNC: 3.6 G/DL (ref 3.5–4.8)
ALBUMIN SERPL-MCNC: 3.6 G/DL (ref 3.5–4.8)
ALBUMIN SERPL-MCNC: 3.7 G/DL (ref 3.5–4.8)
ALBUMIN SERPL-MCNC: 3.8 G/DL (ref 3.5–4.8)
ALP LIVER SERPL-CCNC: 103 U/L (ref 39–100)
ALP LIVER SERPL-CCNC: 107 U/L (ref 39–100)
ALP LIVER SERPL-CCNC: 130 U/L (ref 41–108)
ALP LIVER SERPL-CCNC: 140 U/L (ref 41–108)
ALP LIVER SERPL-CCNC: 154 U/L (ref 41–108)
ALP LIVER SERPL-CCNC: 170 U/L (ref 41–108)
ALP LIVER SERPL-CCNC: 171 U/L (ref 41–108)
ALP LIVER SERPL-CCNC: 198 U/L (ref 39–100)
ALP LIVER SERPL-CCNC: 96 U/L (ref 41–108)
ALT SERPL-CCNC: 22 U/L (ref 14–54)
ALT SERPL-CCNC: 31 U/L (ref 14–54)
ALT SERPL-CCNC: 31 U/L (ref 14–54)
ALT SERPL-CCNC: 34 U/L (ref 14–54)
ALT SERPL-CCNC: 37 U/L (ref 14–54)
ALT SERPL-CCNC: 43 U/L (ref 14–54)
ALT SERPL-CCNC: 43 U/L (ref 14–54)
ALT SERPL-CCNC: 47 U/L (ref 14–54)
ALT SERPL-CCNC: 55 U/L (ref 14–54)
ANTISTREPTOLYSIN O ANTIBODY: 138 IU/ML
AST SERPL-CCNC: 17 U/L (ref 15–41)
AST SERPL-CCNC: 25 U/L (ref 15–41)
AST SERPL-CCNC: 25 U/L (ref 15–41)
AST SERPL-CCNC: 26 U/L (ref 15–41)
AST SERPL-CCNC: 26 U/L (ref 15–41)
AST SERPL-CCNC: 30 U/L (ref 15–41)
AST SERPL-CCNC: 30 U/L (ref 15–41)
AST SERPL-CCNC: 31 U/L (ref 15–41)
AST SERPL-CCNC: 36 U/L (ref 15–41)
BASOPHILS # BLD AUTO: 0.02 X10(3) UL (ref 0–0.1)
BASOPHILS # BLD AUTO: 0.03 X10(3) UL (ref 0–0.1)
BASOPHILS # BLD AUTO: 0.04 X10(3) UL (ref 0–0.1)
BASOPHILS # BLD AUTO: 0.05 X10(3) UL (ref 0–0.1)
BASOPHILS NFR BLD AUTO: 0.1 %
BASOPHILS NFR BLD AUTO: 0.2 %
BASOPHILS NFR BLD AUTO: 0.3 %
BASOPHILS NFR BLD AUTO: 0.3 %
BASOPHILS NFR BLD AUTO: 0.4 %
BASOPHILS NFR BLD AUTO: 0.6 %
BILIRUB SERPL-MCNC: 0.2 MG/DL (ref 0.1–2)
BILIRUB SERPL-MCNC: 0.3 MG/DL (ref 0.1–2)
BILIRUB SERPL-MCNC: 0.4 MG/DL (ref 0.1–2)
BUN BLD-MCNC: 10 MG/DL (ref 8–20)
BUN BLD-MCNC: 11 MG/DL (ref 8–20)
BUN BLD-MCNC: 15 MG/DL (ref 8–20)
BUN BLD-MCNC: 16 MG/DL (ref 8–20)
BUN BLD-MCNC: 17 MG/DL (ref 8–20)
BUN BLD-MCNC: 17 MG/DL (ref 8–20)
BUN BLD-MCNC: 18 MG/DL (ref 8–20)
BUN BLD-MCNC: 24 MG/DL (ref 8–20)
BUN BLD-MCNC: 26 MG/DL (ref 8–20)
CALCIUM BLD-MCNC: 8.8 MG/DL (ref 8.3–10.3)
CALCIUM BLD-MCNC: 8.8 MG/DL (ref 8.3–10.3)
CALCIUM BLD-MCNC: 9.1 MG/DL (ref 8.3–10.3)
CALCIUM BLD-MCNC: 9.2 MG/DL (ref 8.3–10.3)
CALCIUM BLD-MCNC: 9.2 MG/DL (ref 8.3–10.3)
CALCIUM BLD-MCNC: 9.3 MG/DL (ref 8.3–10.3)
CALCIUM BLD-MCNC: 9.4 MG/DL (ref 8.3–10.3)
CALCIUM BLD-MCNC: 9.5 MG/DL (ref 8.3–10.3)
CALCIUM BLD-MCNC: 9.7 MG/DL (ref 8.3–10.3)
CALCIUM BLD-MCNC: 9.7 MG/DL (ref 8.3–10.3)
CALCIUM BLD-MCNC: 9.8 MG/DL (ref 8.3–10.3)
CANCER AG 125 (CA125): 22.7 U/ML (ref ?–35)
CANCER AG 125 (CA125): 27 U/ML (ref ?–35)
CANCER AG 125 (CA125): 27.2 U/ML (ref ?–35)
CANCER AG 125 (CA125): 28.5 U/ML (ref ?–35)
CANCER AG 125 (CA125): 29.1 U/ML (ref ?–35)
CANCER AG 125 (CA125): 46.3 U/ML (ref ?–35)
CHLORIDE: 102 MMOL/L (ref 101–111)
CHLORIDE: 102 MMOL/L (ref 101–111)
CHLORIDE: 104 MMOL/L (ref 101–111)
CHLORIDE: 105 MMOL/L (ref 101–111)
CHLORIDE: 106 MMOL/L (ref 101–111)
CHLORIDE: 106 MMOL/L (ref 101–111)
CHLORIDE: 109 MMOL/L (ref 101–111)
CHOLEST SMN-MCNC: 169 MG/DL (ref ?–200)
CO2: 23 MMOL/L (ref 22–32)
CO2: 24 MMOL/L (ref 22–32)
CO2: 25 MMOL/L (ref 22–32)
CO2: 26 MMOL/L (ref 22–32)
CO2: 26 MMOL/L (ref 22–32)
CO2: 27 MMOL/L (ref 22–32)
CO2: 27 MMOL/L (ref 22–32)
CREAT BLD-MCNC: 0.5 MG/DL (ref 0.55–1.02)
CREAT BLD-MCNC: 0.69 MG/DL (ref 0.55–1.02)
CREAT BLD-MCNC: 0.73 MG/DL (ref 0.55–1.02)
CREAT BLD-MCNC: 0.73 MG/DL (ref 0.55–1.02)
CREAT BLD-MCNC: 0.76 MG/DL (ref 0.55–1.02)
CREAT BLD-MCNC: 0.78 MG/DL (ref 0.55–1.02)
CREAT BLD-MCNC: 0.86 MG/DL (ref 0.55–1.02)
CREAT BLD-MCNC: 0.88 MG/DL (ref 0.55–1.02)
CREAT BLD-MCNC: 0.89 MG/DL (ref 0.55–1.02)
CREAT BLD-MCNC: 0.9 MG/DL (ref 0.55–1.02)
CREAT BLD-MCNC: 0.99 MG/DL (ref 0.55–1.02)
EOSINOPHIL # BLD AUTO: 0.02 X10(3) UL (ref 0–0.3)
EOSINOPHIL # BLD AUTO: 0.1 X10(3) UL (ref 0–0.3)
EOSINOPHIL # BLD AUTO: 0.1 X10(3) UL (ref 0–0.3)
EOSINOPHIL # BLD AUTO: 0.13 X10(3) UL (ref 0–0.3)
EOSINOPHIL # BLD AUTO: 0.14 X10(3) UL (ref 0–0.3)
EOSINOPHIL # BLD AUTO: 0.14 X10(3) UL (ref 0–0.3)
EOSINOPHIL # BLD AUTO: 0.15 X10(3) UL (ref 0–0.3)
EOSINOPHIL # BLD AUTO: 0.17 X10(3) UL (ref 0–0.3)
EOSINOPHIL # BLD AUTO: 0.19 X10(3) UL (ref 0–0.3)
EOSINOPHIL # BLD AUTO: 0.25 X10(3) UL (ref 0–0.3)
EOSINOPHIL # BLD AUTO: 0.27 X10(3) UL (ref 0–0.3)
EOSINOPHIL NFR BLD AUTO: 0.1 %
EOSINOPHIL NFR BLD AUTO: 0.8 %
EOSINOPHIL NFR BLD AUTO: 1.1 %
EOSINOPHIL NFR BLD AUTO: 1.3 %
EOSINOPHIL NFR BLD AUTO: 1.4 %
EOSINOPHIL NFR BLD AUTO: 1.6 %
EOSINOPHIL NFR BLD AUTO: 1.7 %
EOSINOPHIL NFR BLD AUTO: 1.9 %
EOSINOPHIL NFR BLD AUTO: 2.2 %
EOSINOPHIL NFR BLD AUTO: 2.2 %
EOSINOPHIL NFR BLD AUTO: 3.2 %
ERYTHROCYTE [DISTWIDTH] IN BLOOD BY AUTOMATED COUNT: 15.4 % (ref 11.5–16)
ERYTHROCYTE [DISTWIDTH] IN BLOOD BY AUTOMATED COUNT: 15.5 % (ref 11.5–16)
ERYTHROCYTE [DISTWIDTH] IN BLOOD BY AUTOMATED COUNT: 15.5 % (ref 11.5–16)
ERYTHROCYTE [DISTWIDTH] IN BLOOD BY AUTOMATED COUNT: 15.7 % (ref 11.5–16)
ERYTHROCYTE [DISTWIDTH] IN BLOOD BY AUTOMATED COUNT: 15.8 % (ref 11.5–16)
ERYTHROCYTE [DISTWIDTH] IN BLOOD BY AUTOMATED COUNT: 16 % (ref 11.5–16)
ERYTHROCYTE [DISTWIDTH] IN BLOOD BY AUTOMATED COUNT: 16.2 % (ref 11.5–16)
ERYTHROCYTE [DISTWIDTH] IN BLOOD BY AUTOMATED COUNT: 17.2 % (ref 11.5–16)
ERYTHROCYTE [DISTWIDTH] IN BLOOD BY AUTOMATED COUNT: 19.9 % (ref 11.5–16)
ERYTHROCYTE [DISTWIDTH] IN BLOOD BY AUTOMATED COUNT: 21.8 % (ref 11.5–16)
ERYTHROCYTE [DISTWIDTH] IN BLOOD BY AUTOMATED COUNT: 22.3 % (ref 11.5–16)
EST. AVERAGE GLUCOSE BLD GHB EST-MCNC: 243 MG/DL (ref 68–126)
GLUCOSE BLD-MCNC: 134 MG/DL (ref 65–99)
GLUCOSE BLD-MCNC: 135 MG/DL (ref 70–99)
GLUCOSE BLD-MCNC: 145 MG/DL (ref 70–99)
GLUCOSE BLD-MCNC: 146 MG/DL (ref 65–99)
GLUCOSE BLD-MCNC: 169 MG/DL (ref 70–99)
GLUCOSE BLD-MCNC: 179 MG/DL (ref 65–99)
GLUCOSE BLD-MCNC: 179 MG/DL (ref 70–99)
GLUCOSE BLD-MCNC: 181 MG/DL (ref 70–99)
GLUCOSE BLD-MCNC: 186 MG/DL (ref 70–99)
GLUCOSE BLD-MCNC: 209 MG/DL (ref 65–99)
GLUCOSE BLD-MCNC: 226 MG/DL (ref 65–99)
GLUCOSE BLD-MCNC: 234 MG/DL (ref 65–99)
GLUCOSE BLD-MCNC: 252 MG/DL (ref 65–99)
GLUCOSE BLD-MCNC: 255 MG/DL (ref 70–99)
GLUCOSE BLD-MCNC: 263 MG/DL (ref 65–99)
GLUCOSE BLD-MCNC: 267 MG/DL (ref 65–99)
GLUCOSE BLD-MCNC: 301 MG/DL (ref 70–99)
GLUCOSE BLD-MCNC: 304 MG/DL (ref 70–99)
GLUCOSE BLD-MCNC: 320 MG/DL (ref 65–99)
GLUCOSE BLD-MCNC: 331 MG/DL (ref 65–99)
GLUCOSE BLD-MCNC: 333 MG/DL (ref 70–99)
GLUCOSE BLD-MCNC: 94 MG/DL (ref 70–99)
HBA1C MFR BLD HPLC: 10.1 % (ref ?–5.7)
HCT VFR BLD AUTO: 33.7 % (ref 34–50)
HCT VFR BLD AUTO: 34.3 % (ref 34–50)
HCT VFR BLD AUTO: 34.8 % (ref 34–50)
HCT VFR BLD AUTO: 34.9 % (ref 34–50)
HCT VFR BLD AUTO: 35.1 % (ref 34–50)
HCT VFR BLD AUTO: 35.8 % (ref 34–50)
HCT VFR BLD AUTO: 35.9 % (ref 34–50)
HCT VFR BLD AUTO: 35.9 % (ref 34–50)
HCT VFR BLD AUTO: 36.4 % (ref 34–50)
HCT VFR BLD AUTO: 37.4 % (ref 34–50)
HCT VFR BLD AUTO: 38.3 % (ref 34–50)
HDLC SERPL-MCNC: 41 MG/DL (ref 45–?)
HDLC SERPL: 4.12 {RATIO} (ref ?–4.44)
HGB BLD-MCNC: 10.8 G/DL (ref 12–16)
HGB BLD-MCNC: 11.2 G/DL (ref 12–16)
HGB BLD-MCNC: 11.2 G/DL (ref 12–16)
HGB BLD-MCNC: 11.4 G/DL (ref 12–16)
HGB BLD-MCNC: 11.4 G/DL (ref 12–16)
HGB BLD-MCNC: 11.5 G/DL (ref 12–16)
HGB BLD-MCNC: 11.5 G/DL (ref 12–16)
HGB BLD-MCNC: 11.7 G/DL (ref 12–16)
HGB BLD-MCNC: 11.8 G/DL (ref 12–16)
HGB BLD-MCNC: 12.1 G/DL (ref 12–16)
HGB BLD-MCNC: 12.5 G/DL (ref 12–16)
IMMATURE GRANULOCYTE COUNT: 0.03 X10(3) UL (ref 0–1)
IMMATURE GRANULOCYTE COUNT: 0.03 X10(3) UL (ref 0–1)
IMMATURE GRANULOCYTE COUNT: 0.04 X10(3) UL (ref 0–1)
IMMATURE GRANULOCYTE COUNT: 0.05 X10(3) UL (ref 0–1)
IMMATURE GRANULOCYTE COUNT: 0.05 X10(3) UL (ref 0–1)
IMMATURE GRANULOCYTE COUNT: 0.06 X10(3) UL (ref 0–1)
IMMATURE GRANULOCYTE COUNT: 0.08 X10(3) UL (ref 0–1)
IMMATURE GRANULOCYTE COUNT: 0.1 X10(3) UL (ref 0–1)
IMMATURE GRANULOCYTE COUNT: 0.13 X10(3) UL (ref 0–1)
IMMATURE GRANULOCYTE RATIO %: 0.4 %
IMMATURE GRANULOCYTE RATIO %: 0.5 %
IMMATURE GRANULOCYTE RATIO %: 0.5 %
IMMATURE GRANULOCYTE RATIO %: 0.6 %
IMMATURE GRANULOCYTE RATIO %: 0.7 %
IMMATURE GRANULOCYTE RATIO %: 1 %
LARGE PLATELETS: PRESENT
LDLC SERPL CALC-MCNC: 77 MG/DL (ref ?–130)
LYMPHOCYTES # BLD AUTO: 0.82 X10(3) UL (ref 0.9–4)
LYMPHOCYTES # BLD AUTO: 0.95 X10(3) UL (ref 0.9–4)
LYMPHOCYTES # BLD AUTO: 1.02 X10(3) UL (ref 0.9–4)
LYMPHOCYTES # BLD AUTO: 1.03 X10(3) UL (ref 0.9–4)
LYMPHOCYTES # BLD AUTO: 1.08 X10(3) UL (ref 0.9–4)
LYMPHOCYTES # BLD AUTO: 1.16 X10(3) UL (ref 0.9–4)
LYMPHOCYTES # BLD AUTO: 1.16 X10(3) UL (ref 0.9–4)
LYMPHOCYTES # BLD AUTO: 1.19 X10(3) UL (ref 0.9–4)
LYMPHOCYTES # BLD AUTO: 1.31 X10(3) UL (ref 0.9–4)
LYMPHOCYTES # BLD AUTO: 1.32 X10(3) UL (ref 0.9–4)
LYMPHOCYTES # BLD AUTO: 1.66 X10(3) UL (ref 0.9–4)
LYMPHOCYTES NFR BLD AUTO: 10.1 %
LYMPHOCYTES NFR BLD AUTO: 10.7 %
LYMPHOCYTES NFR BLD AUTO: 10.7 %
LYMPHOCYTES NFR BLD AUTO: 12.7 %
LYMPHOCYTES NFR BLD AUTO: 13.2 %
LYMPHOCYTES NFR BLD AUTO: 14.2 %
LYMPHOCYTES NFR BLD AUTO: 14.5 %
LYMPHOCYTES NFR BLD AUTO: 17 %
LYMPHOCYTES NFR BLD AUTO: 8.3 %
LYMPHOCYTES NFR BLD AUTO: 8.4 %
LYMPHOCYTES NFR BLD AUTO: 9.7 %
M PROTEIN MFR SERPL ELPH: 7.6 G/DL (ref 6.1–8.3)
M PROTEIN MFR SERPL ELPH: 7.7 G/DL (ref 6.1–8.3)
M PROTEIN MFR SERPL ELPH: 7.8 G/DL (ref 6.1–8.3)
M PROTEIN MFR SERPL ELPH: 8 G/DL (ref 6.1–8.3)
M PROTEIN MFR SERPL ELPH: 8.2 G/DL (ref 6.1–8.3)
M PROTEIN MFR SERPL ELPH: 8.4 G/DL (ref 6.1–8.3)
M PROTEIN MFR SERPL ELPH: 8.6 G/DL (ref 6.1–8.3)
M PROTEIN MFR SERPL ELPH: 8.7 G/DL (ref 6.1–8.3)
M PROTEIN MFR SERPL ELPH: 8.8 G/DL (ref 6.1–8.3)
MCH RBC QN AUTO: 24.8 PG (ref 27–33.2)
MCH RBC QN AUTO: 25.1 PG (ref 27–33.2)
MCH RBC QN AUTO: 25.2 PG (ref 27–33.2)
MCH RBC QN AUTO: 25.3 PG (ref 27–33.2)
MCH RBC QN AUTO: 26.3 PG (ref 27–33.2)
MCH RBC QN AUTO: 26.5 PG (ref 27–33.2)
MCH RBC QN AUTO: 26.8 PG (ref 27–33.2)
MCH RBC QN AUTO: 26.9 PG (ref 27–33.2)
MCH RBC QN AUTO: 26.9 PG (ref 27–33.2)
MCH RBC QN AUTO: 27.1 PG (ref 27–33.2)
MCH RBC QN AUTO: 27.6 PG (ref 27–33.2)
MCHC RBC AUTO-ENTMCNC: 31.3 G/DL (ref 31–37)
MCHC RBC AUTO-ENTMCNC: 31.8 G/DL (ref 31–37)
MCHC RBC AUTO-ENTMCNC: 32 G/DL (ref 31–37)
MCHC RBC AUTO-ENTMCNC: 32.1 G/DL (ref 31–37)
MCHC RBC AUTO-ENTMCNC: 32.4 G/DL (ref 31–37)
MCHC RBC AUTO-ENTMCNC: 32.6 G/DL (ref 31–37)
MCHC RBC AUTO-ENTMCNC: 32.7 G/DL (ref 31–37)
MCHC RBC AUTO-ENTMCNC: 32.7 G/DL (ref 31–37)
MCHC RBC AUTO-ENTMCNC: 32.8 G/DL (ref 31–37)
MCHC RBC AUTO-ENTMCNC: 32.9 G/DL (ref 31–37)
MCHC RBC AUTO-ENTMCNC: 33 G/DL (ref 31–37)
MCV RBC AUTO: 76.6 FL (ref 81–100)
MCV RBC AUTO: 78.3 FL (ref 81–100)
MCV RBC AUTO: 79.4 FL (ref 81–100)
MCV RBC AUTO: 80.2 FL (ref 81–100)
MCV RBC AUTO: 80.4 FL (ref 81–100)
MCV RBC AUTO: 80.9 FL (ref 81–100)
MCV RBC AUTO: 82 FL (ref 81–100)
MCV RBC AUTO: 82.2 FL (ref 81–100)
MCV RBC AUTO: 82.6 FL (ref 81–100)
MCV RBC AUTO: 84 FL (ref 81–100)
MCV RBC AUTO: 84.5 FL (ref 81–100)
MONOCYTES # BLD AUTO: 0.33 X10(3) UL (ref 0.1–0.6)
MONOCYTES # BLD AUTO: 0.39 X10(3) UL (ref 0.1–0.6)
MONOCYTES # BLD AUTO: 0.41 X10(3) UL (ref 0.1–0.6)
MONOCYTES # BLD AUTO: 0.46 X10(3) UL (ref 0.1–0.6)
MONOCYTES # BLD AUTO: 0.46 X10(3) UL (ref 0.1–0.6)
MONOCYTES # BLD AUTO: 0.48 X10(3) UL (ref 0.1–0.6)
MONOCYTES # BLD AUTO: 0.59 X10(3) UL (ref 0.1–0.6)
MONOCYTES # BLD AUTO: 0.6 X10(3) UL (ref 0.1–0.6)
MONOCYTES # BLD AUTO: 0.64 X10(3) UL (ref 0.1–0.6)
MONOCYTES NFR BLD AUTO: 3.3 %
MONOCYTES NFR BLD AUTO: 3.7 %
MONOCYTES NFR BLD AUTO: 3.9 %
MONOCYTES NFR BLD AUTO: 4.6 %
MONOCYTES NFR BLD AUTO: 5.1 %
MONOCYTES NFR BLD AUTO: 5.2 %
MONOCYTES NFR BLD AUTO: 5.6 %
MONOCYTES NFR BLD AUTO: 5.9 %
MONOCYTES NFR BLD AUTO: 5.9 %
MONOCYTES NFR BLD AUTO: 6.5 %
MONOCYTES NFR BLD AUTO: 7.6 %
NEUTROPHIL ABS PRELIM: 10.32 X10 (3) UL (ref 1.3–6.7)
NEUTROPHIL ABS PRELIM: 10.72 X10 (3) UL (ref 1.3–6.7)
NEUTROPHIL ABS PRELIM: 13.16 X10 (3) UL (ref 1.3–6.7)
NEUTROPHIL ABS PRELIM: 5.81 X10 (3) UL (ref 1.3–6.7)
NEUTROPHIL ABS PRELIM: 6.19 X10 (3) UL (ref 1.3–6.7)
NEUTROPHIL ABS PRELIM: 6.42 X10 (3) UL (ref 1.3–6.7)
NEUTROPHIL ABS PRELIM: 6.97 X10 (3) UL (ref 1.3–6.7)
NEUTROPHIL ABS PRELIM: 7.1 X10 (3) UL (ref 1.3–6.7)
NEUTROPHIL ABS PRELIM: 7.17 X10 (3) UL (ref 1.3–6.7)
NEUTROPHIL ABS PRELIM: 7.39 X10 (3) UL (ref 1.3–6.7)
NEUTROPHIL ABS PRELIM: 9.32 X10 (3) UL (ref 1.3–6.7)
NEUTROPHILS # BLD AUTO: 10.32 X10(3) UL (ref 1.3–6.7)
NEUTROPHILS # BLD AUTO: 10.72 X10(3) UL (ref 1.3–6.7)
NEUTROPHILS # BLD AUTO: 13.16 X10(3) UL (ref 1.3–6.7)
NEUTROPHILS # BLD AUTO: 5.81 X10(3) UL (ref 1.3–6.7)
NEUTROPHILS # BLD AUTO: 6.19 X10(3) UL (ref 1.3–6.7)
NEUTROPHILS # BLD AUTO: 6.42 X10(3) UL (ref 1.3–6.7)
NEUTROPHILS # BLD AUTO: 6.97 X10(3) UL (ref 1.3–6.7)
NEUTROPHILS # BLD AUTO: 7.1 X10(3) UL (ref 1.3–6.7)
NEUTROPHILS # BLD AUTO: 7.17 X10(3) UL (ref 1.3–6.7)
NEUTROPHILS # BLD AUTO: 7.39 X10(3) UL (ref 1.3–6.7)
NEUTROPHILS # BLD AUTO: 9.32 X10(3) UL (ref 1.3–6.7)
NEUTROPHILS NFR BLD AUTO: 73.9 %
NEUTROPHILS NFR BLD AUTO: 75 %
NEUTROPHILS NFR BLD AUTO: 78.4 %
NEUTROPHILS NFR BLD AUTO: 78.4 %
NEUTROPHILS NFR BLD AUTO: 78.6 %
NEUTROPHILS NFR BLD AUTO: 81.2 %
NEUTROPHILS NFR BLD AUTO: 82.5 %
NEUTROPHILS NFR BLD AUTO: 82.8 %
NEUTROPHILS NFR BLD AUTO: 84.6 %
NEUTROPHILS NFR BLD AUTO: 84.6 %
NEUTROPHILS NFR BLD AUTO: 86.3 %
NONHDLC SERPL-MCNC: 128 MG/DL (ref ?–130)
PLATELET # BLD AUTO: 173 10(3)UL (ref 150–450)
PLATELET # BLD AUTO: 212 10(3)UL (ref 150–450)
PLATELET # BLD AUTO: 230 10(3)UL (ref 150–450)
PLATELET # BLD AUTO: 236 10(3)UL (ref 150–450)
PLATELET # BLD AUTO: 260 10(3)UL (ref 150–450)
PLATELET # BLD AUTO: 286 10(3)UL (ref 150–450)
PLATELET # BLD AUTO: 289 10(3)UL (ref 150–450)
PLATELET # BLD AUTO: 334 10(3)UL (ref 150–450)
PLATELET # BLD AUTO: 357 10(3)UL (ref 150–450)
PLATELET # BLD AUTO: 373 10(3)UL (ref 150–450)
PLATELET # BLD AUTO: 376 10(3)UL (ref 150–450)
POTASSIUM SERPL-SCNC: 3.6 MMOL/L (ref 3.6–5.1)
POTASSIUM SERPL-SCNC: 3.8 MMOL/L (ref 3.6–5.1)
POTASSIUM SERPL-SCNC: 4 MMOL/L (ref 3.6–5.1)
POTASSIUM SERPL-SCNC: 4.1 MMOL/L (ref 3.6–5.1)
POTASSIUM SERPL-SCNC: 4.2 MMOL/L (ref 3.6–5.1)
POTASSIUM SERPL-SCNC: 4.3 MMOL/L (ref 3.6–5.1)
POTASSIUM SERPL-SCNC: 4.4 MMOL/L (ref 3.6–5.1)
RBC # BLD AUTO: 4.01 X10(6)UL (ref 3.8–5.1)
RBC # BLD AUTO: 4.06 X10(6)UL (ref 3.8–5.1)
RBC # BLD AUTO: 4.25 X10(6)UL (ref 3.8–5.1)
RBC # BLD AUTO: 4.34 X10(6)UL (ref 3.8–5.1)
RBC # BLD AUTO: 4.38 X10(6)UL (ref 3.8–5.1)
RBC # BLD AUTO: 4.45 X10(6)UL (ref 3.8–5.1)
RBC # BLD AUTO: 4.46 X10(6)UL (ref 3.8–5.1)
RBC # BLD AUTO: 4.5 X10(6)UL (ref 3.8–5.1)
RBC # BLD AUTO: 4.52 X10(6)UL (ref 3.8–5.1)
RBC # BLD AUTO: 4.66 X10(6)UL (ref 3.8–5.1)
RBC # BLD AUTO: 4.88 X10(6)UL (ref 3.8–5.1)
RED CELL DISTRIBUTION WIDTH-SD: 42.7 FL (ref 35.1–46.3)
RED CELL DISTRIBUTION WIDTH-SD: 44.7 FL (ref 35.1–46.3)
RED CELL DISTRIBUTION WIDTH-SD: 45.2 FL (ref 35.1–46.3)
RED CELL DISTRIBUTION WIDTH-SD: 46.5 FL (ref 35.1–46.3)
RED CELL DISTRIBUTION WIDTH-SD: 46.7 FL (ref 35.1–46.3)
RED CELL DISTRIBUTION WIDTH-SD: 49.8 FL (ref 35.1–46.3)
RED CELL DISTRIBUTION WIDTH-SD: 50.2 FL (ref 35.1–46.3)
RED CELL DISTRIBUTION WIDTH-SD: 51.4 FL (ref 35.1–46.3)
RED CELL DISTRIBUTION WIDTH-SD: 59.8 FL (ref 35.1–46.3)
RED CELL DISTRIBUTION WIDTH-SD: 61.2 FL (ref 35.1–46.3)
RED CELL DISTRIBUTION WIDTH-SD: 62.8 FL (ref 35.1–46.3)
SED RATE-ML: 89 MM/HR (ref 0–25)
SODIUM SERPL-SCNC: 135 MMOL/L (ref 136–144)
SODIUM SERPL-SCNC: 136 MMOL/L (ref 136–144)
SODIUM SERPL-SCNC: 136 MMOL/L (ref 136–144)
SODIUM SERPL-SCNC: 137 MMOL/L (ref 136–144)
SODIUM SERPL-SCNC: 137 MMOL/L (ref 136–144)
SODIUM SERPL-SCNC: 138 MMOL/L (ref 136–144)
SODIUM SERPL-SCNC: 139 MMOL/L (ref 136–144)
SODIUM SERPL-SCNC: 140 MMOL/L (ref 136–144)
SODIUM SERPL-SCNC: 140 MMOL/L (ref 136–144)
SODIUM SERPL-SCNC: 141 MMOL/L (ref 136–144)
SODIUM SERPL-SCNC: 144 MMOL/L (ref 136–144)
TRIGLYCERIDES: 257 MG/DL (ref ?–150)
VLDL: 51 MG/DL (ref 5–40)
WBC # BLD AUTO: 11.5 X10(3) UL (ref 4–13)
WBC # BLD AUTO: 12.2 X10(3) UL (ref 4–13)
WBC # BLD AUTO: 12.4 X10(3) UL (ref 4–13)
WBC # BLD AUTO: 15.6 X10(3) UL (ref 4–13)
WBC # BLD AUTO: 7.8 X10(3) UL (ref 4–13)
WBC # BLD AUTO: 8.2 X10(3) UL (ref 4–13)
WBC # BLD AUTO: 8.4 X10(3) UL (ref 4–13)
WBC # BLD AUTO: 8.5 X10(3) UL (ref 4–13)
WBC # BLD AUTO: 8.9 X10(3) UL (ref 4–13)
WBC # BLD AUTO: 9.1 X10(3) UL (ref 4–13)
WBC # BLD AUTO: 9.1 X10(3) UL (ref 4–13)

## 2017-01-01 PROCEDURE — 95251 CONT GLUC MNTR ANALYSIS I&R: CPT | Performed by: NURSE PRACTITIONER

## 2017-01-01 PROCEDURE — 99214 OFFICE O/P EST MOD 30 MIN: CPT | Performed by: FAMILY MEDICINE

## 2017-01-01 PROCEDURE — 99233 SBSQ HOSP IP/OBS HIGH 50: CPT | Performed by: INTERNAL MEDICINE

## 2017-01-01 PROCEDURE — 95250 CONT GLUC MNTR PHYS/QHP EQP: CPT | Performed by: DIETITIAN, REGISTERED

## 2017-01-01 PROCEDURE — 3E03305 INTRODUCTION OF OTHER ANTINEOPLASTIC INTO PERIPHERAL VEIN, PERCUTANEOUS APPROACH: ICD-10-PCS | Performed by: INTERNAL MEDICINE

## 2017-01-01 PROCEDURE — 70450 CT HEAD/BRAIN W/O DYE: CPT | Performed by: HOSPITALIST

## 2017-01-01 PROCEDURE — 49083 ABD PARACENTESIS W/IMAGING: CPT | Performed by: INTERNAL MEDICINE

## 2017-01-01 PROCEDURE — 99232 SBSQ HOSP IP/OBS MODERATE 35: CPT | Performed by: INTERNAL MEDICINE

## 2017-01-01 PROCEDURE — 99215 OFFICE O/P EST HI 40 MIN: CPT | Performed by: FAMILY MEDICINE

## 2017-01-01 PROCEDURE — 99214 OFFICE O/P EST MOD 30 MIN: CPT | Performed by: INTERNAL MEDICINE

## 2017-01-01 PROCEDURE — 99215 OFFICE O/P EST HI 40 MIN: CPT | Performed by: INTERNAL MEDICINE

## 2017-01-01 PROCEDURE — 96375 TX/PRO/DX INJ NEW DRUG ADDON: CPT

## 2017-01-01 PROCEDURE — G0108 DIAB MANAGE TRN  PER INDIV: HCPCS | Performed by: DIETITIAN, REGISTERED

## 2017-01-01 PROCEDURE — 96413 CHEMO IV INFUSION 1 HR: CPT

## 2017-01-01 PROCEDURE — 99214 OFFICE O/P EST MOD 30 MIN: CPT | Performed by: CLINICAL NURSE SPECIALIST

## 2017-01-01 PROCEDURE — 99306 1ST NF CARE HIGH MDM 50: CPT | Performed by: FAMILY MEDICINE

## 2017-01-01 PROCEDURE — 96372 THER/PROPH/DIAG INJ SC/IM: CPT

## 2017-01-01 PROCEDURE — 99231 SBSQ HOSP IP/OBS SF/LOW 25: CPT | Performed by: CLINICAL NURSE SPECIALIST

## 2017-01-01 PROCEDURE — 83036 HEMOGLOBIN GLYCOSYLATED A1C: CPT

## 2017-01-01 PROCEDURE — 82962 GLUCOSE BLOOD TEST: CPT

## 2017-01-01 PROCEDURE — 0W9G3ZZ DRAINAGE OF PERITONEAL CAVITY, PERCUTANEOUS APPROACH: ICD-10-PCS | Performed by: RADIOLOGY

## 2017-01-01 PROCEDURE — 96417 CHEMO IV INFUS EACH ADDL SEQ: CPT

## 2017-01-01 PROCEDURE — 36415 COLL VENOUS BLD VENIPUNCTURE: CPT

## 2017-01-01 PROCEDURE — 99233 SBSQ HOSP IP/OBS HIGH 50: CPT | Performed by: OTHER

## 2017-01-01 PROCEDURE — 96361 HYDRATE IV INFUSION ADD-ON: CPT

## 2017-01-01 PROCEDURE — 80048 BASIC METABOLIC PNL TOTAL CA: CPT

## 2017-01-01 PROCEDURE — 90792 PSYCH DIAG EVAL W/MED SRVCS: CPT | Performed by: OTHER

## 2017-01-01 PROCEDURE — 99232 SBSQ HOSP IP/OBS MODERATE 35: CPT | Performed by: OTHER

## 2017-01-01 PROCEDURE — 85025 COMPLETE CBC W/AUTO DIFF WBC: CPT

## 2017-01-01 PROCEDURE — 71010 XR CHEST AP PORTABLE  (CPT=71010): CPT | Performed by: EMERGENCY MEDICINE

## 2017-01-01 PROCEDURE — 99233 SBSQ HOSP IP/OBS HIGH 50: CPT | Performed by: HOSPITALIST

## 2017-01-01 PROCEDURE — 99255 IP/OBS CONSLTJ NEW/EST HI 80: CPT | Performed by: INTERNAL MEDICINE

## 2017-01-01 PROCEDURE — 74177 CT ABD & PELVIS W/CONTRAST: CPT

## 2017-01-01 PROCEDURE — BT1DZZZ FLUOROSCOPY OF RIGHT KIDNEY, URETER AND BLADDER: ICD-10-PCS | Performed by: UROLOGY

## 2017-01-01 PROCEDURE — 99232 SBSQ HOSP IP/OBS MODERATE 35: CPT | Performed by: HOSPITALIST

## 2017-01-01 PROCEDURE — 86304 IMMUNOASSAY TUMOR CA 125: CPT

## 2017-01-01 PROCEDURE — 71260 CT THORAX DX C+: CPT

## 2017-01-01 PROCEDURE — 96367 TX/PROPH/DG ADDL SEQ IV INF: CPT

## 2017-01-01 PROCEDURE — 83036 HEMOGLOBIN GLYCOSYLATED A1C: CPT | Performed by: NURSE PRACTITIONER

## 2017-01-01 PROCEDURE — 93970 EXTREMITY STUDY: CPT | Performed by: SURGERY

## 2017-01-01 PROCEDURE — 99310 SBSQ NF CARE HIGH MDM 45: CPT | Performed by: NURSE PRACTITIONER

## 2017-01-01 PROCEDURE — 99254 IP/OBS CNSLTJ NEW/EST MOD 60: CPT | Performed by: INTERNAL MEDICINE

## 2017-01-01 PROCEDURE — 99222 1ST HOSP IP/OBS MODERATE 55: CPT | Performed by: STUDENT IN AN ORGANIZED HEALTH CARE EDUCATION/TRAINING PROGRAM

## 2017-01-01 PROCEDURE — 99232 SBSQ HOSP IP/OBS MODERATE 35: CPT | Performed by: NURSE PRACTITIONER

## 2017-01-01 PROCEDURE — 99239 HOSP IP/OBS DSCHRG MGMT >30: CPT | Performed by: HOSPITALIST

## 2017-01-01 PROCEDURE — 80053 COMPREHEN METABOLIC PANEL: CPT

## 2017-01-01 PROCEDURE — 74177 CT ABD & PELVIS W/CONTRAST: CPT | Performed by: EMERGENCY MEDICINE

## 2017-01-01 PROCEDURE — 99213 OFFICE O/P EST LOW 20 MIN: CPT

## 2017-01-01 PROCEDURE — 99231 SBSQ HOSP IP/OBS SF/LOW 25: CPT | Performed by: NURSE PRACTITIONER

## 2017-01-01 PROCEDURE — 73718 MRI LOWER EXTREMITY W/O DYE: CPT | Performed by: STUDENT IN AN ORGANIZED HEALTH CARE EDUCATION/TRAINING PROGRAM

## 2017-01-01 PROCEDURE — 99214 OFFICE O/P EST MOD 30 MIN: CPT | Performed by: NURSE PRACTITIONER

## 2017-01-01 PROCEDURE — 96415 CHEMO IV INFUSION ADDL HR: CPT

## 2017-01-01 PROCEDURE — 99213 OFFICE O/P EST LOW 20 MIN: CPT | Performed by: NURSE PRACTITIONER

## 2017-01-01 PROCEDURE — 72148 MRI LUMBAR SPINE W/O DYE: CPT | Performed by: OTHER

## 2017-01-01 PROCEDURE — 99211 OFF/OP EST MAY X REQ PHY/QHP: CPT

## 2017-01-01 PROCEDURE — 81003 URINALYSIS AUTO W/O SCOPE: CPT | Performed by: FAMILY MEDICINE

## 2017-01-01 PROCEDURE — 82962 GLUCOSE BLOOD TEST: CPT | Performed by: FAMILY MEDICINE

## 2017-01-01 PROCEDURE — 93925 LOWER EXTREMITY STUDY: CPT | Performed by: PHYSICIAN ASSISTANT

## 2017-01-01 PROCEDURE — 99239 HOSP IP/OBS DSCHRG MGMT >30: CPT | Performed by: INTERNAL MEDICINE

## 2017-01-01 PROCEDURE — 99255 IP/OBS CONSLTJ NEW/EST HI 80: CPT | Performed by: OTHER

## 2017-01-01 PROCEDURE — 49083 ABD PARACENTESIS W/IMAGING: CPT | Performed by: HOSPITALIST

## 2017-01-01 PROCEDURE — 96365 THER/PROPH/DIAG IV INF INIT: CPT

## 2017-01-01 PROCEDURE — 76705 ECHO EXAM OF ABDOMEN: CPT | Performed by: INTERNAL MEDICINE

## 2017-01-01 PROCEDURE — 99215 OFFICE O/P EST HI 40 MIN: CPT

## 2017-01-01 PROCEDURE — 95819 EEG AWAKE AND ASLEEP: CPT | Performed by: OTHER

## 2017-01-01 PROCEDURE — 99214 OFFICE O/P EST MOD 30 MIN: CPT

## 2017-01-01 PROCEDURE — 96377 APPLICATON ON-BODY INJECTOR: CPT

## 2017-01-01 PROCEDURE — 83735 ASSAY OF MAGNESIUM: CPT

## 2017-01-01 PROCEDURE — 0T768DZ DILATION OF RIGHT URETER WITH INTRALUMINAL DEVICE, VIA NATURAL OR ARTIFICIAL OPENING ENDOSCOPIC: ICD-10-PCS | Performed by: UROLOGY

## 2017-01-01 PROCEDURE — 99223 1ST HOSP IP/OBS HIGH 75: CPT | Performed by: INTERNAL MEDICINE

## 2017-01-01 PROCEDURE — 36415 COLL VENOUS BLD VENIPUNCTURE: CPT | Performed by: NURSE PRACTITIONER

## 2017-01-01 PROCEDURE — 99222 1ST HOSP IP/OBS MODERATE 55: CPT | Performed by: CLINICAL NURSE SPECIALIST

## 2017-01-01 PROCEDURE — 72146 MRI CHEST SPINE W/O DYE: CPT | Performed by: OTHER

## 2017-01-01 PROCEDURE — 96040 HC GENETIC COUNSELING EA 30 MIN: CPT | Performed by: GENETIC COUNSELOR, MS

## 2017-01-01 PROCEDURE — 99223 1ST HOSP IP/OBS HIGH 75: CPT | Performed by: HOSPITALIST

## 2017-01-01 PROCEDURE — 73630 X-RAY EXAM OF FOOT: CPT | Performed by: EMERGENCY MEDICINE

## 2017-01-01 PROCEDURE — 74230 X-RAY XM SWLNG FUNCJ C+: CPT | Performed by: HOSPITALIST

## 2017-01-01 DEVICE — STENT CONTOUR URET 6X24: Type: IMPLANTABLE DEVICE | Status: FUNCTIONAL

## 2017-01-01 RX ORDER — PREGABALIN 75 MG/1
150 CAPSULE ORAL 2 TIMES DAILY
Status: DISCONTINUED | OUTPATIENT
Start: 2017-01-01 | End: 2017-12-10

## 2017-01-01 RX ORDER — MORPHINE SULFATE 4 MG/ML
2 INJECTION, SOLUTION INTRAMUSCULAR; INTRAVENOUS EVERY 2 HOUR PRN
Status: DISCONTINUED | OUTPATIENT
Start: 2017-01-01 | End: 2017-01-01

## 2017-01-01 RX ORDER — FUROSEMIDE 10 MG/ML
20 INJECTION INTRAMUSCULAR; INTRAVENOUS ONCE
Status: COMPLETED | OUTPATIENT
Start: 2017-01-01 | End: 2017-01-01

## 2017-01-01 RX ORDER — BISACODYL 10 MG
10 SUPPOSITORY, RECTAL RECTAL
Status: DISCONTINUED | OUTPATIENT
Start: 2017-01-01 | End: 2017-12-10

## 2017-01-01 RX ORDER — METOCLOPRAMIDE HYDROCHLORIDE 5 MG/ML
10 INJECTION INTRAMUSCULAR; INTRAVENOUS AS NEEDED
Status: DISCONTINUED | OUTPATIENT
Start: 2017-01-01 | End: 2017-01-01 | Stop reason: HOSPADM

## 2017-01-01 RX ORDER — FUROSEMIDE 20 MG/1
20 TABLET ORAL
Status: DISCONTINUED | OUTPATIENT
Start: 2017-01-01 | End: 2017-01-01

## 2017-01-01 RX ORDER — HYDROCODONE BITARTRATE AND ACETAMINOPHEN 10; 325 MG/1; MG/1
1-2 TABLET ORAL EVERY 4 HOURS PRN
Qty: 120 TABLET | Refills: 0 | Status: SHIPPED | OUTPATIENT
Start: 2017-01-01 | End: 2017-01-01

## 2017-01-01 RX ORDER — HYDROCODONE BITARTRATE AND ACETAMINOPHEN 5; 325 MG/1; MG/1
2 TABLET ORAL DAILY PRN
Qty: 60 TABLET | Refills: 0 | Status: SHIPPED | OUTPATIENT
Start: 2017-01-01 | End: 2017-01-01

## 2017-01-01 RX ORDER — ALBUTEROL SULFATE 2.5 MG/3ML
10 SOLUTION RESPIRATORY (INHALATION) ONCE
Status: COMPLETED | OUTPATIENT
Start: 2017-01-01 | End: 2017-01-01

## 2017-01-01 RX ORDER — MIRTAZAPINE 15 MG/1
7.5 TABLET, FILM COATED ORAL NIGHTLY
Status: DISCONTINUED | OUTPATIENT
Start: 2017-01-01 | End: 2017-12-10

## 2017-01-01 RX ORDER — MIRTAZAPINE 15 MG/1
7.5 TABLET, FILM COATED ORAL NIGHTLY
Status: DISCONTINUED | OUTPATIENT
Start: 2017-01-01 | End: 2017-01-01

## 2017-01-01 RX ORDER — DOCUSATE SODIUM 100 MG/1
100 CAPSULE, LIQUID FILLED ORAL ONCE
Status: COMPLETED | OUTPATIENT
Start: 2017-01-01 | End: 2017-01-01

## 2017-01-01 RX ORDER — ONDANSETRON 2 MG/ML
4 INJECTION INTRAMUSCULAR; INTRAVENOUS EVERY 6 HOURS PRN
Status: DISCONTINUED | OUTPATIENT
Start: 2017-01-01 | End: 2017-01-01

## 2017-01-01 RX ORDER — PROCHLORPERAZINE MALEATE 10 MG
10 TABLET ORAL EVERY 6 HOURS PRN
Status: DISCONTINUED | OUTPATIENT
Start: 2017-01-01 | End: 2017-01-01

## 2017-01-01 RX ORDER — INSULIN GLARGINE 100 [IU]/ML
10 INJECTION, SOLUTION SUBCUTANEOUS EVERY MORNING
Qty: 30 ML | Refills: 0 | COMMUNITY
Start: 2017-01-01 | End: 2017-01-01

## 2017-01-01 RX ORDER — MIDODRINE HYDROCHLORIDE 5 MG/1
5 TABLET ORAL 3 TIMES DAILY
Status: DISCONTINUED | OUTPATIENT
Start: 2017-01-01 | End: 2017-01-01

## 2017-01-01 RX ORDER — HYDROCODONE BITARTRATE AND ACETAMINOPHEN 10; 325 MG/1; MG/1
TABLET ORAL
Qty: 60 TABLET | Refills: 0 | Status: SHIPPED | OUTPATIENT
Start: 2017-01-01 | End: 2017-01-01

## 2017-01-01 RX ORDER — FUROSEMIDE 20 MG/1
20 TABLET ORAL 2 TIMES DAILY
COMMUNITY
End: 2017-01-01

## 2017-01-01 RX ORDER — DEXTROSE MONOHYDRATE 25 G/50ML
25 INJECTION, SOLUTION INTRAVENOUS AS NEEDED
Status: DISCONTINUED | OUTPATIENT
Start: 2017-01-01 | End: 2017-01-01

## 2017-01-01 RX ORDER — CALCIUM GLUCONATE 94 MG/ML
1 INJECTION, SOLUTION INTRAVENOUS ONCE
Status: COMPLETED | OUTPATIENT
Start: 2017-01-01 | End: 2017-01-01

## 2017-01-01 RX ORDER — INSULIN LISPRO 100 [IU]/ML
INJECTION, SOLUTION INTRAVENOUS; SUBCUTANEOUS
Qty: 3 VIAL | Refills: 5 | Status: SHIPPED | OUTPATIENT
Start: 2017-01-01 | End: 2017-01-01

## 2017-01-01 RX ORDER — MULTIVITAMIN WITH FOLIC ACID 400 MCG
1 TABLET ORAL DAILY
Status: DISCONTINUED | OUTPATIENT
Start: 2017-01-01 | End: 2017-01-01

## 2017-01-01 RX ORDER — ATORVASTATIN CALCIUM 40 MG/1
40 TABLET, FILM COATED ORAL NIGHTLY
Status: DISCONTINUED | OUTPATIENT
Start: 2017-01-01 | End: 2017-01-01

## 2017-01-01 RX ORDER — CEPHALEXIN 500 MG/1
500 CAPSULE ORAL 3 TIMES DAILY
Qty: 45 CAPSULE | Refills: 0 | Status: SHIPPED | OUTPATIENT
Start: 2017-01-01 | End: 2017-01-01

## 2017-01-01 RX ORDER — MIDAZOLAM HYDROCHLORIDE 1 MG/ML
1 INJECTION INTRAMUSCULAR; INTRAVENOUS EVERY 5 MIN PRN
Status: DISCONTINUED | OUTPATIENT
Start: 2017-01-01 | End: 2017-01-01 | Stop reason: HOSPADM

## 2017-01-01 RX ORDER — LORAZEPAM 2 MG/ML
1 INJECTION INTRAMUSCULAR EVERY 4 HOURS PRN
Status: DISCONTINUED | OUTPATIENT
Start: 2017-01-01 | End: 2017-12-10

## 2017-01-01 RX ORDER — HYDROCODONE BITARTRATE AND ACETAMINOPHEN 10; 325 MG/1; MG/1
1 TABLET ORAL EVERY 4 HOURS PRN
Status: CANCELLED | OUTPATIENT
Start: 2017-01-01

## 2017-01-01 RX ORDER — HEPARIN SODIUM 5000 [USP'U]/ML
5000 INJECTION, SOLUTION INTRAVENOUS; SUBCUTANEOUS EVERY 8 HOURS SCHEDULED
Status: DISCONTINUED | OUTPATIENT
Start: 2017-01-01 | End: 2017-01-01

## 2017-01-01 RX ORDER — LIDOCAINE HYDROCHLORIDE 20 MG/ML
JELLY TOPICAL AS NEEDED
Status: DISCONTINUED | OUTPATIENT
Start: 2017-01-01 | End: 2017-01-01 | Stop reason: HOSPADM

## 2017-01-01 RX ORDER — FENOFIBRATE 145 MG/1
145 TABLET, COATED ORAL DAILY
Qty: 90 TABLET | Refills: 0 | Status: SHIPPED | OUTPATIENT
Start: 2017-01-01 | End: 2017-01-01

## 2017-01-01 RX ORDER — FUROSEMIDE 10 MG/ML
40 INJECTION INTRAMUSCULAR; INTRAVENOUS ONCE
Status: COMPLETED | OUTPATIENT
Start: 2017-01-01 | End: 2017-01-01

## 2017-01-01 RX ORDER — ENOXAPARIN SODIUM 100 MG/ML
40 INJECTION SUBCUTANEOUS DAILY
Status: DISCONTINUED | OUTPATIENT
Start: 2017-01-01 | End: 2017-01-01

## 2017-01-01 RX ORDER — PREGABALIN 100 MG/1
300 CAPSULE ORAL 2 TIMES DAILY
Status: DISCONTINUED | OUTPATIENT
Start: 2017-01-01 | End: 2017-01-01

## 2017-01-01 RX ORDER — MORPHINE SULFATE 4 MG/ML
1 INJECTION, SOLUTION INTRAMUSCULAR; INTRAVENOUS EVERY 2 HOUR PRN
Status: DISCONTINUED | OUTPATIENT
Start: 2017-01-01 | End: 2017-12-10

## 2017-01-01 RX ORDER — HYDROMORPHONE HYDROCHLORIDE 1 MG/ML
0.8 INJECTION, SOLUTION INTRAMUSCULAR; INTRAVENOUS; SUBCUTANEOUS EVERY 2 HOUR PRN
Status: DISCONTINUED | OUTPATIENT
Start: 2017-01-01 | End: 2017-01-01

## 2017-01-01 RX ORDER — PANTOPRAZOLE SODIUM 40 MG/1
40 TABLET, DELAYED RELEASE ORAL
Status: DISCONTINUED | OUTPATIENT
Start: 2017-01-01 | End: 2017-01-01

## 2017-01-01 RX ORDER — FLUCONAZOLE 100 MG/1
200 TABLET ORAL ONCE
Status: COMPLETED | OUTPATIENT
Start: 2017-01-01 | End: 2017-01-01

## 2017-01-01 RX ORDER — BLOOD SUGAR DIAGNOSTIC
STRIP MISCELLANEOUS
Qty: 300 EACH | Refills: 0 | Status: SHIPPED | OUTPATIENT
Start: 2017-01-01 | End: 2017-01-01

## 2017-01-01 RX ORDER — FLUTICASONE PROPIONATE 50 MCG
1 SPRAY, SUSPENSION (ML) NASAL 2 TIMES DAILY
COMMUNITY
End: 2017-01-01

## 2017-01-01 RX ORDER — DULAGLUTIDE 0.75 MG/.5ML
INJECTION, SOLUTION SUBCUTANEOUS
Qty: 2 ML | Refills: 1 | OUTPATIENT
Start: 2017-01-01

## 2017-01-01 RX ORDER — HYDROCODONE BITARTRATE AND ACETAMINOPHEN 10; 325 MG/1; MG/1
2 TABLET ORAL EVERY 4 HOURS PRN
Status: DISCONTINUED | OUTPATIENT
Start: 2017-01-01 | End: 2017-01-01

## 2017-01-01 RX ORDER — SODIUM CHLORIDE 9 MG/ML
INJECTION, SOLUTION INTRAVENOUS ONCE
Status: COMPLETED | OUTPATIENT
Start: 2017-01-01 | End: 2017-01-01

## 2017-01-01 RX ORDER — PREGABALIN 75 MG/1
150 CAPSULE ORAL 2 TIMES DAILY
Status: CANCELLED | OUTPATIENT
Start: 2017-01-01

## 2017-01-01 RX ORDER — IPRATROPIUM BROMIDE AND ALBUTEROL SULFATE 2.5; .5 MG/3ML; MG/3ML
3 SOLUTION RESPIRATORY (INHALATION) ONCE
Status: COMPLETED | OUTPATIENT
Start: 2017-01-01 | End: 2017-01-01

## 2017-01-01 RX ORDER — DIPHENHYDRAMINE HYDROCHLORIDE 50 MG/ML
25 INJECTION INTRAMUSCULAR; INTRAVENOUS EVERY 6 HOURS PRN
Status: CANCELLED | OUTPATIENT
Start: 2017-01-01

## 2017-01-01 RX ORDER — PREGABALIN 100 MG/1
100 CAPSULE ORAL 3 TIMES DAILY
Status: DISCONTINUED | OUTPATIENT
Start: 2017-01-01 | End: 2017-01-01

## 2017-01-01 RX ORDER — LORAZEPAM 2 MG/ML
2 INJECTION INTRAMUSCULAR EVERY 4 HOURS PRN
Status: DISCONTINUED | OUTPATIENT
Start: 2017-01-01 | End: 2017-12-10

## 2017-01-01 RX ORDER — FUROSEMIDE 20 MG/1
20 TABLET ORAL
Qty: 60 TABLET | Refills: 0 | Status: SHIPPED | OUTPATIENT
Start: 2017-01-01 | End: 2017-01-01

## 2017-01-01 RX ORDER — HYDROMORPHONE HYDROCHLORIDE 1 MG/ML
0.5 INJECTION, SOLUTION INTRAMUSCULAR; INTRAVENOUS; SUBCUTANEOUS EVERY 30 MIN PRN
Status: DISCONTINUED | OUTPATIENT
Start: 2017-01-01 | End: 2017-01-01

## 2017-01-01 RX ORDER — MELATONIN
325
Status: DISCONTINUED | OUTPATIENT
Start: 2017-01-01 | End: 2017-01-01

## 2017-01-01 RX ORDER — MIRTAZAPINE 15 MG/1
7.5 TABLET, FILM COATED ORAL NIGHTLY
Status: CANCELLED | OUTPATIENT
Start: 2017-01-01

## 2017-01-01 RX ORDER — FLUDROCORTISONE ACETATE 0.1 MG/1
0.05 TABLET ORAL DAILY
Status: DISCONTINUED | OUTPATIENT
Start: 2017-01-01 | End: 2017-01-01

## 2017-01-01 RX ORDER — HYDROCODONE BITARTRATE AND ACETAMINOPHEN 10; 325 MG/1; MG/1
1 TABLET ORAL EVERY 4 HOURS PRN
Status: DISCONTINUED | OUTPATIENT
Start: 2017-01-01 | End: 2017-01-01

## 2017-01-01 RX ORDER — HYDROCODONE BITARTRATE AND ACETAMINOPHEN 5; 325 MG/1; MG/1
1 TABLET ORAL EVERY 6 HOURS PRN
Qty: 30 TABLET | Refills: 0 | Status: SHIPPED | OUTPATIENT
Start: 2017-01-01 | End: 2017-01-01 | Stop reason: DRUGHIGH

## 2017-01-01 RX ORDER — POTASSIUM CHLORIDE 14.9 MG/ML
20 INJECTION INTRAVENOUS ONCE
Status: COMPLETED | OUTPATIENT
Start: 2017-01-01 | End: 2017-01-01

## 2017-01-01 RX ORDER — INSULIN GLARGINE 100 [IU]/ML
INJECTION, SOLUTION SUBCUTANEOUS
Qty: 30 ML | Refills: 0 | Status: ON HOLD | OUTPATIENT
Start: 2017-01-01 | End: 2017-01-01

## 2017-01-01 RX ORDER — FUROSEMIDE 20 MG/1
20 TABLET ORAL DAILY
Status: DISCONTINUED | OUTPATIENT
Start: 2017-01-01 | End: 2017-01-01

## 2017-01-01 RX ORDER — SODIUM POLYSTYRENE SULFONATE 15 G/60ML
30 SUSPENSION ORAL; RECTAL ONCE
Status: COMPLETED | OUTPATIENT
Start: 2017-01-01 | End: 2017-01-01

## 2017-01-01 RX ORDER — ONDANSETRON 4 MG/1
8 TABLET, FILM COATED ORAL EVERY 8 HOURS PRN
Status: DISCONTINUED | OUTPATIENT
Start: 2017-01-01 | End: 2017-01-01

## 2017-01-01 RX ORDER — ONDANSETRON 2 MG/ML
4 INJECTION INTRAMUSCULAR; INTRAVENOUS EVERY 4 HOURS PRN
Status: DISCONTINUED | OUTPATIENT
Start: 2017-01-01 | End: 2017-01-01

## 2017-01-01 RX ORDER — INSULIN GLARGINE 100 [IU]/ML
INJECTION, SOLUTION SUBCUTANEOUS
COMMUNITY
Start: 2017-01-01 | End: 2017-01-01

## 2017-01-01 RX ORDER — FLUCONAZOLE 100 MG/1
100 TABLET ORAL DAILY
Qty: 3 TABLET | Refills: 0 | Status: SHIPPED | OUTPATIENT
Start: 2017-01-01 | End: 2017-01-01

## 2017-01-01 RX ORDER — FLUDROCORTISONE ACETATE 0.1 MG/1
0.1 TABLET ORAL DAILY
Status: DISCONTINUED | OUTPATIENT
Start: 2017-01-01 | End: 2017-01-01

## 2017-01-01 RX ORDER — ATORVASTATIN CALCIUM 40 MG/1
40 TABLET, FILM COATED ORAL NIGHTLY
Qty: 90 TABLET | Refills: 0 | Status: SHIPPED | OUTPATIENT
Start: 2017-01-01 | End: 2017-01-01

## 2017-01-01 RX ORDER — FENOFIBRATE 134 MG/1
134 CAPSULE ORAL
Status: DISCONTINUED | OUTPATIENT
Start: 2017-01-01 | End: 2017-01-01

## 2017-01-01 RX ORDER — HYDROMORPHONE HYDROCHLORIDE 1 MG/ML
0.4 INJECTION, SOLUTION INTRAMUSCULAR; INTRAVENOUS; SUBCUTANEOUS EVERY 2 HOUR PRN
Status: DISCONTINUED | OUTPATIENT
Start: 2017-01-01 | End: 2017-01-01

## 2017-01-01 RX ORDER — HYDROCODONE BITARTRATE AND ACETAMINOPHEN 10; 325 MG/1; MG/1
2 TABLET ORAL EVERY 4 HOURS PRN
Status: CANCELLED | OUTPATIENT
Start: 2017-01-01

## 2017-01-01 RX ORDER — MORPHINE SULFATE 4 MG/ML
1 INJECTION, SOLUTION INTRAMUSCULAR; INTRAVENOUS EVERY 4 HOURS PRN
Status: DISCONTINUED | OUTPATIENT
Start: 2017-01-01 | End: 2017-01-01

## 2017-01-01 RX ORDER — HYDROCODONE BITARTRATE AND ACETAMINOPHEN 10; 325 MG/1; MG/1
1-2 TABLET ORAL EVERY 4 HOURS PRN
Status: DISCONTINUED | OUTPATIENT
Start: 2017-01-01 | End: 2017-01-01 | Stop reason: SDUPTHER

## 2017-01-01 RX ORDER — ACETAMINOPHEN 325 MG/1
650 TABLET ORAL EVERY 6 HOURS PRN
Status: DISCONTINUED | OUTPATIENT
Start: 2017-01-01 | End: 2017-01-01

## 2017-01-01 RX ORDER — ONDANSETRON 2 MG/ML
4 INJECTION INTRAMUSCULAR; INTRAVENOUS
Status: COMPLETED | OUTPATIENT
Start: 2017-01-01 | End: 2017-01-01

## 2017-01-01 RX ORDER — MORPHINE SULFATE 1 MG/ML
1 INJECTION, SOLUTION EPIDURAL; INTRATHECAL; INTRAVENOUS EVERY 4 HOURS PRN
Status: DISCONTINUED | OUTPATIENT
Start: 2017-01-01 | End: 2017-01-01 | Stop reason: CLARIF

## 2017-01-01 RX ORDER — DEXTROSE MONOHYDRATE 25 G/50ML
50 INJECTION, SOLUTION INTRAVENOUS
Status: DISCONTINUED | OUTPATIENT
Start: 2017-01-01 | End: 2017-01-01

## 2017-01-01 RX ORDER — ONDANSETRON 2 MG/ML
8 INJECTION INTRAMUSCULAR; INTRAVENOUS EVERY 6 HOURS PRN
Status: DISCONTINUED | OUTPATIENT
Start: 2017-01-01 | End: 2017-01-01 | Stop reason: SDUPTHER

## 2017-01-01 RX ORDER — SODIUM CHLORIDE 9 MG/ML
INJECTION, SOLUTION INTRAVENOUS CONTINUOUS
Status: DISCONTINUED | OUTPATIENT
Start: 2017-01-01 | End: 2017-01-01

## 2017-01-01 RX ORDER — DEXTROSE MONOHYDRATE 25 G/50ML
50 INJECTION, SOLUTION INTRAVENOUS AS NEEDED
Status: DISCONTINUED | OUTPATIENT
Start: 2017-01-01 | End: 2017-01-01

## 2017-01-01 RX ORDER — ONDANSETRON 2 MG/ML
4 INJECTION INTRAMUSCULAR; INTRAVENOUS AS NEEDED
Status: DISCONTINUED | OUTPATIENT
Start: 2017-01-01 | End: 2017-01-01 | Stop reason: HOSPADM

## 2017-01-01 RX ORDER — IBUPROFEN 400 MG/1
400 TABLET ORAL EVERY 6 HOURS PRN
Status: DISCONTINUED | OUTPATIENT
Start: 2017-01-01 | End: 2017-01-01

## 2017-01-01 RX ORDER — HYDROCODONE BITARTRATE AND ACETAMINOPHEN 10; 325 MG/1; MG/1
TABLET ORAL
Qty: 60 TABLET | Refills: 0 | Status: CANCELLED | OUTPATIENT
Start: 2017-01-01

## 2017-01-01 RX ORDER — SULFAMETHOXAZOLE AND TRIMETHOPRIM 800; 160 MG/1; MG/1
1 TABLET ORAL 2 TIMES DAILY
Qty: 14 TABLET | Refills: 0 | Status: ON HOLD | OUTPATIENT
Start: 2017-01-01 | End: 2017-01-01

## 2017-01-01 RX ORDER — CLOTRIMAZOLE AND BETAMETHASONE DIPROPIONATE 10; .64 MG/G; MG/G
CREAM TOPICAL 2 TIMES DAILY
COMMUNITY
End: 2017-01-01

## 2017-01-01 RX ORDER — FLUTICASONE PROPIONATE 50 MCG
1 SPRAY, SUSPENSION (ML) NASAL 2 TIMES DAILY
Status: CANCELLED | OUTPATIENT
Start: 2017-01-01

## 2017-01-01 RX ORDER — FLUDROCORTISONE ACETATE 0.1 MG/1
0.05 TABLET ORAL DAILY
Qty: 20 TABLET | Refills: 0 | Status: SHIPPED | OUTPATIENT
Start: 2017-01-01 | End: 2017-01-01

## 2017-01-01 RX ORDER — AMOXICILLIN 500 MG/1
500 CAPSULE ORAL EVERY 8 HOURS
Qty: 9 CAPSULE | Refills: 0 | Status: SHIPPED | OUTPATIENT
Start: 2017-01-01 | End: 2017-01-01

## 2017-01-01 RX ORDER — SODIUM CHLORIDE 9 MG/ML
INJECTION, SOLUTION INTRAVENOUS CONTINUOUS
Status: ACTIVE | OUTPATIENT
Start: 2017-01-01 | End: 2017-01-01

## 2017-01-01 RX ORDER — PROCHLORPERAZINE MALEATE 10 MG
10 TABLET ORAL EVERY 6 HOURS PRN
Status: CANCELLED | OUTPATIENT
Start: 2017-01-01

## 2017-01-01 RX ORDER — PREGABALIN 200 MG/1
CAPSULE ORAL
Qty: 90 CAPSULE | Refills: 1 | Status: SHIPPED | OUTPATIENT
Start: 2017-01-01 | End: 2017-01-01 | Stop reason: DRUGHIGH

## 2017-01-01 RX ORDER — DEXTROSE AND SODIUM CHLORIDE 5; .45 G/100ML; G/100ML
100 INJECTION, SOLUTION INTRAVENOUS CONTINUOUS PRN
Status: DISCONTINUED | OUTPATIENT
Start: 2017-01-01 | End: 2017-01-01

## 2017-01-01 RX ORDER — HYDROCODONE BITARTRATE AND ACETAMINOPHEN 10; 325 MG/1; MG/1
2 TABLET ORAL EVERY 4 HOURS PRN
Status: DISCONTINUED | OUTPATIENT
Start: 2017-01-01 | End: 2017-12-10

## 2017-01-01 RX ORDER — BLOOD SUGAR DIAGNOSTIC
STRIP MISCELLANEOUS
Qty: 1 BOX | Refills: 0 | Status: SHIPPED | OUTPATIENT
Start: 2017-01-01 | End: 2017-01-01

## 2017-01-01 RX ORDER — INSULIN GLARGINE 100 [IU]/ML
15 INJECTION, SOLUTION SUBCUTANEOUS 2 TIMES DAILY
Qty: 30 ML | Refills: 0 | Status: SHIPPED | OUTPATIENT
Start: 2017-01-01 | End: 2017-01-01

## 2017-01-01 RX ORDER — PROCHLORPERAZINE MALEATE 10 MG
10 TABLET ORAL EVERY 6 HOURS PRN
Qty: 30 TABLET | Refills: 3 | Status: SHIPPED | OUTPATIENT
Start: 2017-01-01 | End: 2017-01-01

## 2017-01-01 RX ORDER — PROCHLORPERAZINE MALEATE 10 MG
10 TABLET ORAL EVERY 6 HOURS PRN
Qty: 60 TABLET | Refills: 5 | Status: SHIPPED | OUTPATIENT
Start: 2017-01-01 | End: 2017-01-01

## 2017-01-01 RX ORDER — INSULIN ASPART 100 [IU]/ML
INJECTION, SOLUTION INTRAVENOUS; SUBCUTANEOUS ONCE
Status: DISCONTINUED | OUTPATIENT
Start: 2017-01-01 | End: 2017-01-01 | Stop reason: HOSPADM

## 2017-01-01 RX ORDER — FLUDROCORTISONE ACETATE 0.1 MG/1
0.1 TABLET ORAL DAILY
Status: DISCONTINUED | OUTPATIENT
Start: 2017-01-01 | End: 2017-01-01 | Stop reason: SDUPTHER

## 2017-01-01 RX ORDER — HYDROCODONE BITARTRATE AND ACETAMINOPHEN 5; 325 MG/1; MG/1
TABLET ORAL
COMMUNITY
Start: 2017-01-01 | End: 2017-01-01

## 2017-01-01 RX ORDER — ONDANSETRON 4 MG/1
8 TABLET, FILM COATED ORAL EVERY 8 HOURS PRN
Status: CANCELLED | OUTPATIENT
Start: 2017-01-01

## 2017-01-01 RX ORDER — ALBUMIN (HUMAN) 12.5 G/50ML
25 SOLUTION INTRAVENOUS ONCE
Status: COMPLETED | OUTPATIENT
Start: 2017-01-01 | End: 2017-01-01

## 2017-01-01 RX ORDER — GLYCOPYRROLATE 0.2 MG/ML
0.4 INJECTION, SOLUTION INTRAMUSCULAR; INTRAVENOUS
Status: DISCONTINUED | OUTPATIENT
Start: 2017-01-01 | End: 2017-12-10

## 2017-01-01 RX ORDER — MAGNESIUM OXIDE 400 MG (241.3 MG MAGNESIUM) TABLET
400 TABLET ONCE
Status: COMPLETED | OUTPATIENT
Start: 2017-01-01 | End: 2017-01-01

## 2017-01-01 RX ORDER — HYDROCODONE BITARTRATE AND ACETAMINOPHEN 10; 325 MG/1; MG/1
TABLET ORAL
Qty: 60 TABLET | Refills: 0 | Status: SHIPPED | OUTPATIENT
Start: 2017-01-01 | End: 2017-01-01 | Stop reason: SDUPTHER

## 2017-01-01 RX ORDER — HYDROCODONE BITARTRATE AND ACETAMINOPHEN 10; 325 MG/1; MG/1
1 TABLET ORAL ONCE
Status: COMPLETED | OUTPATIENT
Start: 2017-01-01 | End: 2017-01-01

## 2017-01-01 RX ORDER — ONDANSETRON 2 MG/ML
4 INJECTION INTRAMUSCULAR; INTRAVENOUS EVERY 6 HOURS PRN
Status: DISCONTINUED | OUTPATIENT
Start: 2017-01-01 | End: 2017-12-10

## 2017-01-01 RX ORDER — SODIUM POLYSTYRENE SULFONATE 15 G/60ML
15 SUSPENSION ORAL; RECTAL ONCE
COMMUNITY
Start: 2017-01-01 | End: 2017-01-01

## 2017-01-01 RX ORDER — SODIUM CHLORIDE 9 MG/ML
125 INJECTION, SOLUTION INTRAVENOUS CONTINUOUS
Status: DISCONTINUED | OUTPATIENT
Start: 2017-01-01 | End: 2017-01-01

## 2017-01-01 RX ORDER — NALOXONE HYDROCHLORIDE 0.4 MG/ML
80 INJECTION, SOLUTION INTRAMUSCULAR; INTRAVENOUS; SUBCUTANEOUS AS NEEDED
Status: DISCONTINUED | OUTPATIENT
Start: 2017-01-01 | End: 2017-01-01 | Stop reason: HOSPADM

## 2017-01-01 RX ORDER — SODIUM CHLORIDE, SODIUM LACTATE, POTASSIUM CHLORIDE, CALCIUM CHLORIDE 600; 310; 30; 20 MG/100ML; MG/100ML; MG/100ML; MG/100ML
INJECTION, SOLUTION INTRAVENOUS CONTINUOUS
Status: DISCONTINUED | OUTPATIENT
Start: 2017-01-01 | End: 2017-01-01

## 2017-01-01 RX ORDER — ONDANSETRON HYDROCHLORIDE 8 MG/1
TABLET, FILM COATED ORAL
COMMUNITY
Start: 2016-12-06 | End: 2017-01-01

## 2017-01-01 RX ORDER — INSULIN GLARGINE 100 [IU]/ML
INJECTION, SOLUTION SUBCUTANEOUS
Status: ON HOLD | COMMUNITY
Start: 2017-01-01 | End: 2017-01-01

## 2017-01-01 RX ORDER — MEPERIDINE HYDROCHLORIDE 25 MG/ML
12.5 INJECTION INTRAMUSCULAR; INTRAVENOUS; SUBCUTANEOUS AS NEEDED
Status: DISCONTINUED | OUTPATIENT
Start: 2017-01-01 | End: 2017-01-01 | Stop reason: HOSPADM

## 2017-01-01 RX ORDER — MORPHINE SULFATE 4 MG/ML
1 INJECTION, SOLUTION INTRAMUSCULAR; INTRAVENOUS EVERY 2 HOUR PRN
Status: CANCELLED | OUTPATIENT
Start: 2017-01-01

## 2017-01-01 RX ORDER — HYDROCODONE BITARTRATE AND ACETAMINOPHEN 5; 325 MG/1; MG/1
1 TABLET ORAL EVERY 6 HOURS PRN
Status: DISCONTINUED | OUTPATIENT
Start: 2017-01-01 | End: 2017-01-01

## 2017-01-01 RX ORDER — HYDROCODONE BITARTRATE AND ACETAMINOPHEN 5; 325 MG/1; MG/1
1 TABLET ORAL EVERY 4 HOURS PRN
Status: DISCONTINUED | OUTPATIENT
Start: 2017-01-01 | End: 2017-01-01

## 2017-01-01 RX ORDER — ONDANSETRON HYDROCHLORIDE 8 MG/1
8 TABLET, FILM COATED ORAL EVERY 8 HOURS PRN
Qty: 30 TABLET | Refills: 3 | Status: SHIPPED | OUTPATIENT
Start: 2017-01-01 | End: 2017-01-01

## 2017-01-01 RX ORDER — ONDANSETRON 4 MG/1
8 TABLET, FILM COATED ORAL EVERY 8 HOURS PRN
Status: DISCONTINUED | OUTPATIENT
Start: 2017-01-01 | End: 2017-12-10

## 2017-01-01 RX ORDER — MAGNESIUM CARB/ALUMINUM HYDROX 105-160MG
296 TABLET,CHEWABLE ORAL ONCE
Status: COMPLETED | OUTPATIENT
Start: 2017-01-01 | End: 2017-01-01

## 2017-01-01 RX ORDER — BIOTIN 1 MG
5000 TABLET ORAL DAILY
COMMUNITY
End: 2017-01-01

## 2017-01-01 RX ORDER — INSULIN LISPRO 100 [IU]/ML
INJECTION, SOLUTION INTRAVENOUS; SUBCUTANEOUS
Status: SHIPPED | COMMUNITY
Start: 2017-01-01 | End: 2017-01-01

## 2017-01-01 RX ORDER — HYDROMORPHONE HYDROCHLORIDE 1 MG/ML
0.4 INJECTION, SOLUTION INTRAMUSCULAR; INTRAVENOUS; SUBCUTANEOUS EVERY 5 MIN PRN
Status: DISCONTINUED | OUTPATIENT
Start: 2017-01-01 | End: 2017-01-01 | Stop reason: HOSPADM

## 2017-01-01 RX ORDER — HYDROCODONE BITARTRATE AND ACETAMINOPHEN 10; 325 MG/1; MG/1
1 TABLET ORAL EVERY 6 HOURS PRN
Status: DISCONTINUED | OUTPATIENT
Start: 2017-01-01 | End: 2017-01-01

## 2017-01-01 RX ORDER — POTASSIUM CHLORIDE 20 MEQ/1
40 TABLET, EXTENDED RELEASE ORAL EVERY 4 HOURS
Status: COMPLETED | OUTPATIENT
Start: 2017-01-01 | End: 2017-01-01

## 2017-01-01 RX ORDER — HYDROCODONE BITARTRATE AND ACETAMINOPHEN 10; 325 MG/1; MG/1
1 TABLET ORAL AS NEEDED
COMMUNITY
Start: 2017-01-01 | End: 2017-01-01 | Stop reason: SDUPTHER

## 2017-01-01 RX ORDER — INSULIN GLARGINE 100 [IU]/ML
INJECTION, SOLUTION SUBCUTANEOUS
Qty: 15 ML | Refills: 0 | Status: SHIPPED | OUTPATIENT
Start: 2017-01-01 | End: 2017-01-01

## 2017-01-01 RX ORDER — FLUCONAZOLE 200 MG/1
200 TABLET ORAL DAILY
Qty: 7 TABLET | Refills: 0 | Status: SHIPPED | OUTPATIENT
Start: 2017-01-01 | End: 2017-01-01 | Stop reason: ALTCHOICE

## 2017-01-01 RX ORDER — MELATONIN
325
COMMUNITY
End: 2017-01-01

## 2017-01-01 RX ORDER — FUROSEMIDE 20 MG/1
20 TABLET ORAL DAILY
Qty: 30 TABLET | Refills: 0 | Status: SHIPPED | OUTPATIENT
Start: 2017-01-01 | End: 2017-01-01

## 2017-01-01 RX ORDER — PREGABALIN 150 MG/1
CAPSULE ORAL
Qty: 120 CAPSULE | Refills: 2 | Status: CANCELLED | OUTPATIENT
Start: 2017-01-01

## 2017-01-01 RX ORDER — MORPHINE SULFATE 2 MG/ML
1 INJECTION, SOLUTION INTRAMUSCULAR; INTRAVENOUS EVERY 4 HOURS PRN
Status: DISCONTINUED | OUTPATIENT
Start: 2017-01-01 | End: 2017-01-01 | Stop reason: SDUPTHER

## 2017-01-01 RX ORDER — MORPHINE SULFATE 4 MG/ML
1 INJECTION, SOLUTION INTRAMUSCULAR; INTRAVENOUS EVERY 2 HOUR PRN
Status: DISCONTINUED | OUTPATIENT
Start: 2017-01-01 | End: 2017-01-01

## 2017-01-01 RX ORDER — LIDOCAINE AND PRILOCAINE 25; 25 MG/G; MG/G
CREAM TOPICAL 2 TIMES DAILY
Status: DISCONTINUED | OUTPATIENT
Start: 2017-01-01 | End: 2017-01-01

## 2017-01-01 RX ORDER — FUROSEMIDE 10 MG/ML
40 INJECTION INTRAMUSCULAR; INTRAVENOUS EVERY 12 HOURS
Status: DISCONTINUED | OUTPATIENT
Start: 2017-01-01 | End: 2017-01-01

## 2017-01-01 RX ORDER — SODIUM POLYSTYRENE SULFONATE 15 G/60ML
15 SUSPENSION ORAL; RECTAL ONCE
Status: DISCONTINUED | OUTPATIENT
Start: 2017-01-01 | End: 2017-01-01

## 2017-01-01 RX ORDER — FLUTICASONE PROPIONATE 50 MCG
1 SPRAY, SUSPENSION (ML) NASAL 2 TIMES DAILY
Status: DISCONTINUED | OUTPATIENT
Start: 2017-01-01 | End: 2017-12-10

## 2017-01-01 RX ORDER — SCOLOPAMINE TRANSDERMAL SYSTEM 1 MG/1
1 PATCH, EXTENDED RELEASE TRANSDERMAL
Status: DISCONTINUED | OUTPATIENT
Start: 2017-01-01 | End: 2017-12-10

## 2017-01-01 RX ORDER — FLUTICASONE PROPIONATE 50 MCG
1 SPRAY, SUSPENSION (ML) NASAL 2 TIMES DAILY
Status: DISCONTINUED | OUTPATIENT
Start: 2017-01-01 | End: 2017-01-01

## 2017-01-01 RX ORDER — ACETAMINOPHEN 325 MG/1
650 TABLET ORAL EVERY 6 HOURS PRN
Status: CANCELLED | OUTPATIENT
Start: 2017-01-01

## 2017-01-01 RX ORDER — HYDROMORPHONE HYDROCHLORIDE 1 MG/ML
1 INJECTION, SOLUTION INTRAMUSCULAR; INTRAVENOUS; SUBCUTANEOUS ONCE
Status: COMPLETED | OUTPATIENT
Start: 2017-01-01 | End: 2017-01-01

## 2017-01-01 RX ORDER — LIDOCAINE AND PRILOCAINE 25; 25 MG/G; MG/G
CREAM TOPICAL
Qty: 30 G | Refills: 1 | Status: SHIPPED | OUTPATIENT
Start: 2017-01-01 | End: 2017-01-01

## 2017-01-01 RX ORDER — POTASSIUM CHLORIDE 20 MEQ/1
40 TABLET, EXTENDED RELEASE ORAL ONCE
Status: DISCONTINUED | OUTPATIENT
Start: 2017-01-01 | End: 2017-01-01

## 2017-01-01 RX ORDER — LORATADINE 10 MG/1
10 TABLET ORAL DAILY
COMMUNITY
End: 2017-01-01

## 2017-01-01 RX ORDER — PROCHLORPERAZINE MALEATE 10 MG
TABLET ORAL
COMMUNITY
Start: 2016-12-06 | End: 2017-01-01

## 2017-01-01 RX ORDER — ATORVASTATIN CALCIUM 40 MG/1
40 TABLET, FILM COATED ORAL NIGHTLY
COMMUNITY
End: 2017-01-01

## 2017-01-01 RX ORDER — HYDROCODONE BITARTRATE AND ACETAMINOPHEN 10; 325 MG/1; MG/1
1 TABLET ORAL ONCE
Status: CANCELLED
Start: 2017-01-01 | End: 2017-01-01

## 2017-01-01 RX ORDER — LIDOCAINE AND PRILOCAINE 25; 25 MG/G; MG/G
CREAM TOPICAL NIGHTLY
Status: DISCONTINUED | OUTPATIENT
Start: 2017-01-01 | End: 2017-01-01

## 2017-01-01 RX ORDER — FLUCONAZOLE 100 MG/1
200 TABLET ORAL DAILY
Status: DISCONTINUED | OUTPATIENT
Start: 2017-01-01 | End: 2017-01-01

## 2017-01-01 RX ORDER — LIDOCAINE AND PRILOCAINE 25; 25 MG/G; MG/G
CREAM TOPICAL NIGHTLY PRN
COMMUNITY
End: 2017-01-01

## 2017-01-01 RX ORDER — ACETAMINOPHEN 325 MG/1
650 TABLET ORAL EVERY 6 HOURS PRN
Status: DISCONTINUED | OUTPATIENT
Start: 2017-01-01 | End: 2017-12-10

## 2017-01-01 RX ORDER — HYDROMORPHONE HYDROCHLORIDE 1 MG/ML
0.5 INJECTION, SOLUTION INTRAMUSCULAR; INTRAVENOUS; SUBCUTANEOUS EVERY 30 MIN PRN
Status: COMPLETED | OUTPATIENT
Start: 2017-01-01 | End: 2017-01-01

## 2017-01-01 RX ORDER — AMITRIPTYLINE HYDROCHLORIDE 150 MG/1
150 TABLET, FILM COATED ORAL NIGHTLY
Qty: 90 TABLET | Refills: 1 | Status: SHIPPED | OUTPATIENT
Start: 2017-01-01 | End: 2017-01-01

## 2017-01-01 RX ORDER — FLUDROCORTISONE ACETATE 0.1 MG/1
0.1 TABLET ORAL DAILY
Qty: 30 TABLET | Refills: 0 | Status: SHIPPED | OUTPATIENT
Start: 2017-01-01 | End: 2017-01-01

## 2017-01-01 RX ORDER — DEXTROSE AND SODIUM CHLORIDE 5; .9 G/100ML; G/100ML
INJECTION, SOLUTION INTRAVENOUS CONTINUOUS
Status: DISCONTINUED | OUTPATIENT
Start: 2017-01-01 | End: 2017-01-01

## 2017-01-01 RX ORDER — SODIUM POLYSTYRENE SULFONATE 15 G/60ML
30 SUSPENSION ORAL; RECTAL ONCE
Status: DISCONTINUED | OUTPATIENT
Start: 2017-01-01 | End: 2017-01-01

## 2017-01-01 RX ORDER — HYDROCODONE BITARTRATE AND ACETAMINOPHEN 10; 325 MG/1; MG/1
TABLET ORAL
Qty: 30 TABLET | Refills: 0 | Status: SHIPPED | OUTPATIENT
Start: 2017-01-01 | End: 2017-01-01

## 2017-01-01 RX ORDER — ONDANSETRON HYDROCHLORIDE 8 MG/1
8 TABLET, FILM COATED ORAL EVERY 8 HOURS PRN
Qty: 30 TABLET | Refills: 3 | Status: SHIPPED
Start: 2017-01-01 | End: 2017-01-01

## 2017-01-01 RX ORDER — HYDROCODONE BITARTRATE AND ACETAMINOPHEN 5; 325 MG/1; MG/1
1 TABLET ORAL EVERY 4 HOURS PRN
Qty: 20 TABLET | Refills: 0 | Status: SHIPPED | OUTPATIENT
Start: 2017-01-01 | End: 2017-01-01

## 2017-01-01 RX ORDER — MIRTAZAPINE 7.5 MG/1
7.5 TABLET, FILM COATED ORAL NIGHTLY
COMMUNITY
End: 2017-01-01

## 2017-01-01 RX ORDER — DIPHENHYDRAMINE HYDROCHLORIDE 50 MG/ML
12.5 INJECTION INTRAMUSCULAR; INTRAVENOUS AS NEEDED
Status: DISCONTINUED | OUTPATIENT
Start: 2017-01-01 | End: 2017-01-01 | Stop reason: HOSPADM

## 2017-01-01 RX ORDER — MORPHINE SULFATE 4 MG/ML
4 INJECTION, SOLUTION INTRAMUSCULAR; INTRAVENOUS EVERY 30 MIN PRN
Status: COMPLETED | OUTPATIENT
Start: 2017-01-01 | End: 2017-01-01

## 2017-01-01 RX ORDER — DIPHENHYDRAMINE HYDROCHLORIDE 50 MG/ML
25 INJECTION INTRAMUSCULAR; INTRAVENOUS EVERY 6 HOURS PRN
Status: DISCONTINUED | OUTPATIENT
Start: 2017-01-01 | End: 2017-12-10

## 2017-01-01 RX ORDER — PREGABALIN 100 MG/1
100 CAPSULE ORAL 3 TIMES DAILY
Qty: 90 CAPSULE | Refills: 0 | Status: SHIPPED | OUTPATIENT
Start: 2017-01-01 | End: 2017-01-01 | Stop reason: DRUGHIGH

## 2017-01-01 RX ORDER — AMOXICILLIN 500 MG/1
500 CAPSULE ORAL EVERY 8 HOURS
Status: DISCONTINUED | OUTPATIENT
Start: 2017-01-01 | End: 2017-01-01

## 2017-01-01 RX ORDER — HYDROCODONE BITARTRATE AND ACETAMINOPHEN 10; 325 MG/1; MG/1
1 TABLET ORAL EVERY 4 HOURS PRN
Status: DISCONTINUED | OUTPATIENT
Start: 2017-01-01 | End: 2017-12-10

## 2017-01-01 RX ORDER — BIOTIN 1 MG
5000 TABLET ORAL DAILY
Status: DISCONTINUED | OUTPATIENT
Start: 2017-01-01 | End: 2017-01-01

## 2017-01-01 RX ORDER — DOXEPIN HYDROCHLORIDE 50 MG/1
1 CAPSULE ORAL DAILY
Status: DISCONTINUED | OUTPATIENT
Start: 2017-01-01 | End: 2017-01-01

## 2017-01-01 RX ORDER — MIDODRINE HYDROCHLORIDE 5 MG/1
5 TABLET ORAL 3 TIMES DAILY
Qty: 90 TABLET | Refills: 0 | Status: SHIPPED | OUTPATIENT
Start: 2017-01-01 | End: 2017-01-01

## 2017-01-01 RX ORDER — INSULIN LISPRO 100 [IU]/ML
INJECTION, SOLUTION INTRAVENOUS; SUBCUTANEOUS
Qty: 9 VIAL | Refills: 0 | Status: SHIPPED | OUTPATIENT
Start: 2017-01-01 | End: 2017-01-01

## 2017-01-01 RX ORDER — FENTANYL 25 UG/H
PATCH TRANSDERMAL
COMMUNITY
Start: 2016-01-01 | End: 2017-01-01

## 2017-01-01 RX ORDER — DEXAMETHASONE 4 MG/1
8 TABLET ORAL 2 TIMES DAILY
Qty: 12 TABLET | Refills: 6 | Status: SHIPPED | OUTPATIENT
Start: 2017-01-01 | End: 2017-01-01

## 2017-01-01 RX ORDER — IBUPROFEN 600 MG/1
600 TABLET ORAL ONCE
Status: COMPLETED | OUTPATIENT
Start: 2017-01-01 | End: 2017-01-01

## 2017-01-01 RX ORDER — PREGABALIN 75 MG/1
150 CAPSULE ORAL 2 TIMES DAILY
Status: DISCONTINUED | OUTPATIENT
Start: 2017-01-01 | End: 2017-01-01

## 2017-01-01 RX ORDER — DEXTROSE MONOHYDRATE 25 G/50ML
50 INJECTION, SOLUTION INTRAVENOUS
Status: DISCONTINUED | OUTPATIENT
Start: 2017-01-01 | End: 2017-01-01 | Stop reason: HOSPADM

## 2017-01-01 RX ORDER — HYDROCODONE BITARTRATE AND ACETAMINOPHEN 5; 325 MG/1; MG/1
1 TABLET ORAL AS NEEDED
Status: DISCONTINUED | OUTPATIENT
Start: 2017-01-01 | End: 2017-01-01 | Stop reason: HOSPADM

## 2017-01-01 RX ORDER — ONDANSETRON 2 MG/ML
4 INJECTION INTRAMUSCULAR; INTRAVENOUS EVERY 6 HOURS PRN
Status: CANCELLED | OUTPATIENT
Start: 2017-01-01

## 2017-01-01 RX ORDER — HYDROCODONE BITARTRATE AND ACETAMINOPHEN 5; 325 MG/1; MG/1
2 TABLET ORAL AS NEEDED
Status: DISCONTINUED | OUTPATIENT
Start: 2017-01-01 | End: 2017-01-01 | Stop reason: HOSPADM

## 2017-01-01 RX ORDER — FAMOTIDINE 20 MG/1
20 TABLET ORAL 2 TIMES DAILY
Status: DISCONTINUED | OUTPATIENT
Start: 2017-01-01 | End: 2017-01-01

## 2017-01-01 RX ORDER — HYDROMORPHONE HYDROCHLORIDE 1 MG/ML
0.2 INJECTION, SOLUTION INTRAMUSCULAR; INTRAVENOUS; SUBCUTANEOUS EVERY 2 HOUR PRN
Status: DISCONTINUED | OUTPATIENT
Start: 2017-01-01 | End: 2017-01-01

## 2017-01-01 RX ORDER — PREGABALIN 300 MG/1
300 CAPSULE ORAL 2 TIMES DAILY
Qty: 180 CAPSULE | Refills: 1 | Status: SHIPPED | OUTPATIENT
Start: 2017-01-01 | End: 2017-01-01

## 2017-01-01 RX ORDER — ONDANSETRON 4 MG/1
4 TABLET, FILM COATED ORAL ONCE
Status: COMPLETED | OUTPATIENT
Start: 2017-01-01 | End: 2017-01-01

## 2017-01-01 RX ORDER — HYDROCODONE BITARTRATE AND ACETAMINOPHEN 10; 325 MG/1; MG/1
TABLET ORAL
Qty: 60 TABLET | Refills: 0 | Status: SHIPPED | OUTPATIENT
Start: 2017-01-01 | End: 2017-01-01 | Stop reason: ALTCHOICE

## 2017-01-01 RX ORDER — PROCHLORPERAZINE MALEATE 10 MG
10 TABLET ORAL EVERY 6 HOURS PRN
Status: DISCONTINUED | OUTPATIENT
Start: 2017-01-01 | End: 2017-12-10

## 2017-01-01 RX ORDER — INSULIN LISPRO 100 [IU]/ML
INJECTION, SOLUTION INTRAVENOUS; SUBCUTANEOUS
Qty: 9 VIAL | Refills: 0 | Status: ON HOLD | OUTPATIENT
Start: 2017-01-01 | End: 2017-01-01

## 2017-01-01 RX ORDER — ACETAMINOPHEN 325 MG/1
650 TABLET ORAL EVERY 6 HOURS PRN
COMMUNITY
End: 2017-01-01

## 2017-01-01 RX ORDER — DIPHENHYDRAMINE HYDROCHLORIDE 50 MG/ML
25 INJECTION INTRAMUSCULAR; INTRAVENOUS EVERY 6 HOURS PRN
Status: DISCONTINUED | OUTPATIENT
Start: 2017-01-01 | End: 2017-01-01

## 2017-01-01 RX ORDER — POLYETHYLENE GLYCOL 3350 17 G/17G
17 POWDER, FOR SOLUTION ORAL ONCE
Status: DISCONTINUED | OUTPATIENT
Start: 2017-01-01 | End: 2017-01-01

## 2017-01-01 RX ORDER — INSULIN LISPRO 100 [IU]/ML
INJECTION, SOLUTION INTRAVENOUS; SUBCUTANEOUS
COMMUNITY
End: 2017-01-01

## 2017-01-01 RX ORDER — ATORVASTATIN CALCIUM 40 MG/1
40 TABLET, FILM COATED ORAL NIGHTLY
Qty: 90 TABLET | Refills: 1 | Status: SHIPPED | OUTPATIENT
Start: 2017-01-01 | End: 2017-01-01

## 2017-01-01 RX ADMIN — HYDROCODONE BITARTRATE AND ACETAMINOPHEN 1 TABLET: 10; 325 TABLET ORAL at 15:18:00

## 2017-01-09 PROBLEM — C78.7 LIVER METASTASES: Status: ACTIVE | Noted: 2017-01-01

## 2017-01-09 PROBLEM — C78.7 LIVER METASTASES (HCC): Status: ACTIVE | Noted: 2017-01-01

## 2017-01-09 PROBLEM — C78.6 PERITONEAL METASTASES (HCC): Status: ACTIVE | Noted: 2017-01-01

## 2017-01-09 PROBLEM — Z85.43 HISTORY OF OVARIAN CANCER: Status: ACTIVE | Noted: 2017-01-01

## 2017-01-09 PROBLEM — C78.6 PERITONEAL METASTASES: Status: ACTIVE | Noted: 2017-01-01

## 2017-01-09 NOTE — PROGRESS NOTES
Patient signed medical release form. MRF faxed to Dr. Emily Enrique office requesting medical records from 8/16-12/16.   Faxed to [phone]

## 2017-01-09 NOTE — PROGRESS NOTES
Pt here to re-establish oncology care. Pt has been receiving care/treatment @ [facility] d/t insurance coverage. Pt received last dose of Doxil q. 4 weeks on 12/27/16. Energy level and appetite fair. Some nausea after tx.   Reports neuropathy of

## 2017-01-09 NOTE — PROGRESS NOTES
[facility] Progress Note      Patient Name:  Ni Zhou  YOB: 1965  Medical Record Number:  [id number]    Date of visit:  1/9/2017    CHIEF COMPLAINT: Metastatic ovarian carcinoma.     HPI:     49year old that I have seen onc HYDROcodone-acetaminophen  MG Oral Tab  Disp:  Rfl:    Prochlorperazine Maleate (COMPAZINE) 10 mg tablet  Disp:  Rfl:    Ondansetron HCl (ZOFRAN) 8 MG tablet  Disp:  Rfl:    FARXIGA 10 MG Oral Tab TAKE 1 TABLET (10 MG) BY ORAL ROUTE ONCE DAILY IN T auscultation and percussion. Abdomen: Soft, non tender, no hepato-splenomegaly. Extremities:  No edema. CNS: no focal deficit    Emotional well being: Patient's emotional well being was assessed.   No issues requiring acute psychosocial intervention were (A) (none)   Tear Drop Cells Small (A) (none)   Large Platelets Present (A) (none)       ASSESSMENT AND PLAN:     # Metastatic ovarian carcinoma:49year old female that is status post preoperative chemotherapy followed by debulking surgery in 2014 and oscar

## 2017-01-09 NOTE — PATIENT INSTRUCTIONS
For Dr. Camejo Severe nurse line, call  [phone] with any questions or concerns Monday through Friday 8:00 to 4:30.   After hours or weekends for emergent needs [phone]

## 2017-01-09 NOTE — PROGRESS NOTES
SW met with patient and her mother regarding medical marijuana form and SW completed and mailed it to [facility]. Patient to begin chemo in [place] on 1/23. Patient tearful regarding her diagnosis. SW provided  emotional support.

## 2017-01-13 NOTE — PROGRESS NOTES
HPI:   Marcos Kc is a 49year old female    This is a very pleasant 79-year-old  female who returns to us. She had a change in her insurance last year and was unable to follow-up with us.   Her insurance changed again and she returns for co Cap Take one cap 2-3 times a day Disp: 90 capsule Rfl: 1   fentaNYL 25 MCG/HR Transdermal Patch 72 Hr  Disp:  Rfl:    HYDROcodone-acetaminophen  MG Oral Tab  Disp:  Rfl:    Prochlorperazine Maleate (COMPAZINE) 10 mg tablet  Disp:  Rfl:    Ondansetron Status: Never Used                        Alcohol Use: No              Exercise: none.   Diet: watches fats closely and watches sugar closely     REVIEW OF SYSTEMS:   Constitutional: feels well overall  HENT: Denies changes in vision  SKIN: denies any unusu eyes affected by moderate nonproliferative retinopathy without macular edema, with long-term current use of insulin (HCC)  Continue current medications. Therapeutic lifestyle changes discussed.     - Dapagliflozin Propanediol (FARXIGA) 10 MG Oral Tab; TAKE #5            Orders Placed This Encounter  Hemoglobin A1C  Lipid Panel [E]    Meds & Refills for this Visit:  Signed Prescriptions Disp Refills    Amitriptyline HCl 150 MG Oral Tab 90 tablet 1      Sig: Take 1 tablet (150 mg total) by mouth nightly.

## 2017-01-22 PROBLEM — Z79.4 TYPE 2 DIABETES MELLITUS WITH BOTH EYES AFFECTED BY MODERATE NONPROLIFERATIVE RETINOPATHY WITHOUT MACULAR EDEMA, WITH LONG-TERM CURRENT USE OF INSULIN (HCC): Status: ACTIVE | Noted: 2017-01-01

## 2017-01-22 PROBLEM — E11.3393 TYPE 2 DIABETES MELLITUS WITH BOTH EYES AFFECTED BY MODERATE NONPROLIFERATIVE RETINOPATHY WITHOUT MACULAR EDEMA, WITH LONG-TERM CURRENT USE OF INSULIN (HCC): Status: ACTIVE | Noted: 2017-01-01

## 2017-01-23 NOTE — PROGRESS NOTES
Pt here for MD f/u visit and due for Doxil. Energy level and appetite fair. Some nausea after tx.  Reports neuropathy of both feet, not affecting ADLs. Takes Norco q. 4-6hrs as needed for pain.      Education Record    Learner:  Patient    Disease / Vernelle Show

## 2017-01-23 NOTE — PROGRESS NOTES
Education Record    Learner:  Patient    Disease / Flynn roberts    Barriers / Limitations:  None    Method:  Brief focused, printed material and  reinforcement    General Topics:  Plan of care reviewed    Outcome:  Shows understanding

## 2017-01-23 NOTE — PROGRESS NOTES
[facility] Progress Note      Patient Name:  Ramone Lopez  YOB: 1965  Medical Record Number:  [id number]    Date of visit:  1/23/2017    CHIEF COMPLAINT: Metastatic ovarian carcinoma.     HPI:     49year old that I have seen on Prochlorperazine Maleate (COMPAZINE) 10 mg tablet Take 1 tablet (10 mg total) by mouth every 6 (six) hours as needed for Nausea.  Disp: 30 tablet Rfl: 3   Prochlorperazine Maleate (COMPAZINE) 10 mg tablet Take 1 tablet (10 mg total) by mouth every 6 (six) 1334)  Temp: 97.2 °F (36.2 °C) ( 1334)  Do Not Use - Resp Rate: --  SpO2: --    PS:  ECO    PHYSICAL EXAM:    General: alert and oriented x 3, not in acute distress. Accompanied by mother. HEENT: CHRISTI, oropharynx  clear. Neck: supple.   No JVD 7.6 %   Eosinophil % 3.2 %   Basophil % 0.6 %   Immature Granulocyte % 0.5 %       ASSESSMENT AND PLAN:     # Metastatic ovarian carcinoma:49year old female that is status post preoperative chemotherapy followed by debulking surgery in 2014 and additional

## 2017-01-23 NOTE — PATIENT INSTRUCTIONS
To reach Dr Electa Castleman nurse during business hours, please call [phone]. After hours, including weekends, evenings, and holidays, please call the main number [phone] for emergent needs.     Your last dose of Ondansetron (zofran) was at 3pm. Your ne

## 2017-01-24 NOTE — TELEPHONE ENCOUNTER
Date of Treatment: 1/23/17                             Type of Chemo: Doxil    Comments: Spoke with patient. [facility] is [facility] way [facility] She feels great and denies any side effects from her treatment.  She did receive m

## 2017-01-24 NOTE — TELEPHONE ENCOUNTER
Patient has insurance limitation of #9 zofran every 3 days. If she needs more than 9 tablets, she can refill every 9 days. Left message with the patient to discuss, pharmacy will get prescription ready for her.

## 2017-01-24 NOTE — PROGRESS NOTES
Sari Alondra  : 3/10/1965 was seen for Insulin Pump Follow up:    Date: 2017   Start time: 3:30pm End time: 5pm      Assessment:     Assessment: /62 mmHg  Wt 231 lb 8 oz  LMP 2014       HEMOGLOBIN A1C (% of total Hgb)   Date Value 8-20 mg/dL   Creatinine 0.76 0.55-1.02 mg/dL   GFR 92 >=60   Calcium, Total 8.8 8.3-10.3 mg/dL   Alkaline Phosphatase 103 (H)  U/L   AST 36 15-41 U/L   Alt 55 (H) 14-54 U/L   Bilirubin, Total 0.2 0.1-2.0 mg/dL   Total Protein 7.7 6.1-8.3 g/dL   Album skipped breakfast so she could conserve on points    Taking Medication  Injectables:reports that her previous Endocrinologist w/[facility] sand[facility]'d Bydureon    Reducing Risk  Overview of complications reviewed including eye, dental, CV health, renal protecti

## 2017-01-26 NOTE — TELEPHONE ENCOUNTER
PCP has agreed with referral to [facility] Diabetes services for diabetes management as well as order for Dexcom G4 diagnostic evaluation. Orders forwarded to PCP for review.

## 2017-01-27 NOTE — TELEPHONE ENCOUNTER
----- Message from Amanda Paula DO sent at 1/26/2017 11:06 PM CST -----  Please call patient, we will discuss her blood test results at her upcoming appointment. Her triglycerides are elevated but are improved compared to 6 months ago.   Her LDL is to

## 2017-01-27 NOTE — PROGRESS NOTES
Quick Note:    Pt informed of results and that doc will discuss all results at her upcoming appt. .  ______

## 2017-01-27 NOTE — TELEPHONE ENCOUNTER
Pt informed of test results, doc will address all lab results at her upcoming appt. Pt verbalized understanding and had no questions at this time.  Future Appointments  Date Time Provider Antoinette Mccracken   1/30/2017 2:00 PM Jelena Cummings [address] [facility]

## 2017-02-06 NOTE — PROGRESS NOTES
Referring Provider: Tiffany Sutton MD    Reason for Referral:  Saeid Sawant was referred for genetic counseling because of a personal history of ovarian cancer.   Ms. Pranav Benitez is a 49year-old woman of [place] who was diagnosed wi hereditary cancer syndrome.   Signs of a hereditary cancer syndrome include some rare cancers, common cancers occurring at unusually young ages, multiple primary cancers in the some individual, or the same type of cancer or related cancers (e.g., breast and usually not possible to determine if the gene variant is responsible for an individual’s increased cancer risk. If Ms. Manuel Brar is found to carry a deleterious mutation in the BRCA1/2 genes, she is at significantly increased risk for breast, ovarian, and mutation carriers. Other cancer risks in carriers include an increased risk for prostate cancer, up to a 7% lifetime risk for pancreatic cancer, and up to a 7% lifetime risk of male breast cancer.     Medical management options for BRCA1/2 mutation carrier consent was obtained. Blood and paperwork were sent to [facility] for insurance pre-determination. [facility] handles billing for BRCA1/2 genetic testing. If Ms. Rey Palomino has any responsibility for the cost of genetic testing, [facility] will contact Ms. Brynn Fry

## 2017-02-07 NOTE — PROGRESS NOTES
Haritha Sernamac   3/10/1965 was seen for Diagnostic Continuous Glucose Sensor Initial Evaluation:    Date: 2017  Start time: 4pm End time: 4:30pm    Dexcom G4 Platinum: Transmitter #[id number]  SN: [id number]  Sensor Lot: [id number]  Expiration: 10/2

## 2017-02-13 NOTE — PROGRESS NOTES
HPI:   Ni Zhou is a 49year old female    Type 2 diabetes: Uncontrolled. Insulin-dependent. Patient has started working with Kasey Ortiz, diabetic nurse practitioner. Patient has not started taking the Jardiance yet.   Denies episodes of hypoglycemia a day Disp: 90 capsule Rfl: 1   LIDOCAINE-PRILOCAINE 2.5-2.5 % External Cream APPLY TO THE AFFECTED AREA(S) NIGHTLY Disp: 30 g Rfl: 0   Glucose Blood (ABIMBOLA CONTOUR NEXT TEST) In Vitro Strip Test 4-6 times daily Disp: 550 each Rfl: 3   Cholecalciferol (VIT Past Surgical History    INSULIN PUMP INITIATION       DELIVERY ONLY      COLONOSCOPY,DIAGNOSTIC  10-2-14    Comment Dr. Jarvis Acuna ENDOSCOPY,DIAGNOSIS  10-2-14    Comment Dr. Lincoln Flores Corpuscular Hemoglobin      27.0-33.2 pg 25.2 (L) 25.1 (L) 24.0 (L)   Mean Corpuscular HGB Conc      31.0-37.0 g/dL 31.8 32.1 31.6   RDW      11.5-16.0 % 21.8 (H) 22.3 (H) 16.7 (H)   RDW-SD      35.1-46.3 fL 62.8 (H) 61.2 (H) 45.5   Prelim Neutrophil Abs <130 mg/dL 128     CHOL/HDLC RATIO      < OR = 5.0 (calc)  6.2 (H) 6.0 (H)   NON-HDL CHOLESTEROL        166 (H) 150         ASSESSMENT AND PLAN:   Rishabh Oh is a 49year old female     Type 2 diabetes mellitus with hyperglycemia, with long-term curr Pain.  Dispense: 60 tablet; Refill: 0  - HYDROcodone-acetaminophen (NORCO) 5-325 MG Oral Tab; Take 2 tablets by mouth daily as needed for Pain. Dispense: 60 tablet; Refill: 0    4.  Diabetic peripheral neuropathy (HCC)  Painful peripheral neuropathy improv total) by mouth daily. Return in about 3 months (around 5/13/2017) for Chronic Conditions and as needed.

## 2017-02-13 NOTE — PATIENT INSTRUCTIONS
Start Trulicity 0.97 mg weekly X 2 weeks then increase to 1.5mg weekly X 2 weeks   Use bolus calculator/set bolus  Stop Farxiga start Jardiance 25 mg daily   Test sugar 4 times daily and enter in pump   Use sides of abdomen for sets   Return in 3 weeks

## 2017-02-13 NOTE — PROGRESS NOTES
CC: Patient presents with:  Diabetes: new pt to [facility] pt did bring pump and dexcom.       Initial Visit     HISTORY:  Past Medical History   Diagnosis Date   • Type II or unspecified type diabetes mellitus without mention of complication, not stated as uncontr A1C 10.5* 07/02/2016       Diabetes Type: 2  Duration: 18 years   Current Meds: metformin 1000 mg bid, humalog via pump, farxiga 10 mg daily    SE: denies   Failed Meds: bydureon   Downloaded medtronic 530G  pump  Downloaded Dexcom CGMS      units Inject 0.75 mg into the skin once a week., Disp: 2 pen, Rfl: 1  •  Empagliflozin (JARDIANCE) 10 MG Oral Tab, Take 10 mg by mouth daily. , Disp: 30 tablet, Rfl: 0  •  Empagliflozin (JARDIANCE) 25 MG Oral Tab, Take 25 mg by mouth daily. , Disp: 30 tablet, Rfl: time. No distress. HEENT: Normocephalic and atraumatic. Eyes: Conjunctivae are normal.   Neck: Normal range of motion. Neck supple. Normal carotid pulses. No mass or thyromegaly present.    Cardiovascular: Normal rate, regular rhythm and intact distal p Sig: Take 25 mg by mouth daily.            Imaging & Consults:  None    DM Health Maintenance  Up to date on annual labs up to date   Ophtho / dilated eye exam: up to date   Albumin/Cr ratio: no proteinuria   Monofilament exam: up to date   BP: at goal   Pauline Sour

## 2017-02-13 NOTE — PROGRESS NOTES
Paula,    New patient, she understands basics of balanced plate.     Downloaded Dexcom G4 office unit: shows BG's acceptable morning until afternoon then elevated  Downloaded Medtronic Revel pump: shows pt has been taking a bolus more frequently since on de

## 2017-02-14 NOTE — TELEPHONE ENCOUNTER
Wanted her to try the Trulicity for 4 weeks to see if it is worth her remaining on. When she comes back for f/u we can decide if this should be continued and ordered at that time. The Jardiance was started and ordered as a replacement for farxiga.    Thanks

## 2017-02-15 NOTE — PROGRESS NOTES
Referring Provider: Zaina Rodriguez MD    Reason for Referral:  Ms. Jose Manuel Mcwilliams had genetic testing performed on 2/6/17 because of a personal history of ovarian cancer at age 51.     Genetic Testing Result:  Ms. Reji Valderrama tested positive for two deleterious parents so that they may have their cancer risks assessed by a physician and/or genetic counselor. We discussed that BRCA1 testing is not recommended for minors (her son is 16years-old). As an adult (>18 years), Ms. Duran’s son should discuss the option Roger’s family members was discussed. I will mail Ms. Kush Lee a copy of her genetic test results and a family letter to share with her relatives. I encouraged Ms. Duran to share these results with her family members so that they may discuss the benefits and l

## 2017-02-20 NOTE — PROGRESS NOTES
[facility] Progress Note    Patient Name: Chacorta Islas   YOB: 1965   Medical Record Number: [id number]   CSN: [id number]   Date of visit: 2/20/2017   Provider: MARIBEL Castro  Referring Physician: Mario Guerrero    Problem List: also better with cannabis oil. She is otherwise afebrile, no complaints of bowel or urinary issues. Appetite is fair. Energy is fair. She is at this visit with her mother.     Allergies:  No Known Allergies    Vital Signs:  /82 mmHg  Pulse 105  Te UNITS PER DAY VIA PUMP, Disp: 3 vial, Rfl: 5  •  MetFORMIN HCl 1000 MG Oral Tab, Take 1 tablet (1,000 mg total) by mouth 2 (two) times daily with meals. , Disp: 180 tablet, Rfl: 1  •  pregabalin (LYRICA) 200 MG Oral Cap, Take one cap 2-3 times a day, Disp: undergoing Carboplatin/Doxil. She will proceed with cycle 3. She will have restaging CT chest/abd/pelvis with contrast prior to cycle 4. The patient underwent genetic testing. She had some questions concerning the results.   I am not aware of results

## 2017-02-20 NOTE — PROGRESS NOTES
Pt here for APN assessment and lilibeth 3 chemotherapy. Energy and appetite good. 1-2 days of nausea after chemotherapy then resolves. Denies bowel problems. Recently started medical marijuana oil for pain relief. Pt reports taking nightly.  She states it helps

## 2017-02-20 NOTE — PATIENT INSTRUCTIONS
For Dr. Santiago Leaver nurse line, call  [phone] with any questions or concerns Monday through Friday 8:00 to 4:30.   After hours or weekends for emergent needs [phone]

## 2017-02-20 NOTE — PROGRESS NOTES
Education Record  Learner:  Patient  Disease / Diagnosis:   Metastatic ovarian cancer  Barriers / Limitations:  None  Method:  Printed material and Reinforcement  General Topics:  Plan of care reviewed; neulasta instruction  Outcome:  Shows understanding a

## 2017-02-21 NOTE — TELEPHONE ENCOUNTER
Spoke with Pt per [facility] request. Pt cannot come for repeat lab draw until next visit. She said it was discussed that it would be added on however, tubes had  prior to the tests being ordered. Pt aware and will have this drawn next visit.

## 2017-02-27 NOTE — TELEPHONE ENCOUNTER
Form was sent to the Diabetic Educator, Jose Buckner for clairification.   Unable to call "[employer]"/Medical supply store as no phone number was received

## 2017-02-28 PROBLEM — C56.9: Status: ACTIVE | Noted: 2017-01-01

## 2017-02-28 PROBLEM — Z15.09: Status: ACTIVE | Noted: 2017-01-01

## 2017-02-28 PROBLEM — Z15.09 BRCA1 POSITIVE: Status: ACTIVE | Noted: 2017-01-01

## 2017-02-28 PROBLEM — Z15.01: Status: ACTIVE | Noted: 2017-01-01

## 2017-02-28 PROBLEM — Z15.01 BRCA1 POSITIVE: Status: ACTIVE | Noted: 2017-01-01

## 2017-03-01 NOTE — PROGRESS NOTES
Pt here for RN assessment. Pt states on Friday she noticed a pea-size lump on the back of forearm. +tenderness. Area does itch. No redness. No warmth. States lump has not gotten worse since then. Denies injury to area. Denies fever/chills.    Discussed pt c

## 2017-03-02 NOTE — TELEPHONE ENCOUNTER
Michelle given contact info for Darryl Langley Diabetes Educator [phone]  Form was sent there for further clarification and they should be completing form and sending it to medical supply store    Correct # for [address] in [phone] ext [phone]

## 2017-03-03 NOTE — TELEPHONE ENCOUNTER
Received signed CMN from Dr. Juancho Byrd supplies. : Roxannagerry Babb phone [phone] or fax [phone] . CMN completed & faxed along w/office notes to  [phone] w/confirmation received.

## 2017-03-03 NOTE — TELEPHONE ENCOUNTER
At 12:05pm Macrina REYNOLDS Diabetic educator called office to let Dr. Dulce Hope know that pt had experienced an episode where she was incoherent and could not move but yet her blood sugar was normal range of 110. Family called 911 and pt was taken to [facility].   Per [employer]

## 2017-03-03 NOTE — TELEPHONE ENCOUNTER
Patient is not able to make appt on Monday at 10:30 am , She states she has to work. Is there another time that she can be seen. She states that W,Th or Fri. work better for her.

## 2017-03-03 NOTE — TELEPHONE ENCOUNTER
Dr Keara Posada, your next available appt in March 13.  Do you want to see patient sooner and if yes where can we put her in?

## 2017-03-06 PROBLEM — Z96.41 INSULIN PUMP IN PLACE: Status: ACTIVE | Noted: 2017-01-01

## 2017-03-06 NOTE — TELEPHONE ENCOUNTER
We received a fax from [employer] [facility] stating trulicity is requiring a prior auth because its not on their formulary. Please advise ?

## 2017-03-06 NOTE — PROGRESS NOTES
CC: Patient presents with:  Diabetes: follow up. pt did bring her pump.       Follow up Visit     HISTORY:  Past Medical History   Diagnosis Date   • Type II or unspecified type diabetes mellitus without mention of complication, not stated as uncontrolled medtronic 530G  pump     units   Insulin humalog   Glucometer:Rod contour next      Time  12am 8:00 am 6:00pm    ICR 1:5       ISF 1:15      Basal 3.0 3.55 3.1        Target B-120 mg/dl    Insulin Action Time: 4 hours       Overall glucose co HYDROcodone-acetaminophen (NORCO) 5-325 MG Oral Tab, Take 2 tablets by mouth daily as needed for Pain., Disp: 60 tablet, Rfl: 0  •  Atorvastatin Calcium 40 MG Oral Tab, Take 1 tablet (40 mg total) by mouth nightly., Disp: 90 tablet, Rfl: 1  •  Fenofibrate Cardiovascular: Normal rate, regular rhythm and intact distal pulses. No murmur, rubs or gallops. Pulmonary/Chest: Effort normal and breath sounds normal. No respiratory distress. No wheezes, rhonchi or rales   Abdominal: Soft.  Bowel sounds are normal. 3-4 times daily - fasting, premeals and/or bedtime and enter in pump     Call/fax/email pump download or return for f/u in 21 days. Return in about 3 weeks (around 3/27/2017) for diabetes.     Pt verbalized understanding and has no further questions at t

## 2017-03-06 NOTE — PATIENT INSTRUCTIONS
Continue trulicity 1.5 mg weekly   Continue jardiance 25 mg daily   Test sugar at least 3 times daily   Call in readings to here or to [facility] in 1 week   Return in 3 weeks

## 2017-03-08 NOTE — TELEPHONE ENCOUNTER
PA initiated via CoverMyMeds to [place]   Key: [id number] - eligibility could not be located with information on file

## 2017-03-08 NOTE — TELEPHONE ENCOUNTER
Called [employer] to obtain Rx benefit information provided with [phone] ID: [id number]  Called Number provided and it is through [employer] resent through CoverMyMeds Key: [facility]  Will need additional information once reviewed by plan, will

## 2017-03-16 NOTE — TELEPHONE ENCOUNTER
Spoke to patient and she said she cannot take Norco any more because she is going on a constant glucose monitor and  the glucose readings are higher because of the acetaminophen so she wants to have it changed to this medication called  Zohydro- hydrocodon

## 2017-03-16 NOTE — PROGRESS NOTES
Pilo Collazo  NQY6/ was seen for Personal Continuous Glucose Monitoring Training/Start:    Date: 3/16/2017  Start time: 2pm End time: 3pm    Sensor Type: Dexcom G5    Patient verbalized understanding of the followin.  Differences in sensor

## 2017-03-16 NOTE — TELEPHONE ENCOUNTER
I do not prescribe this medication, I will not prescribe this medication. This is a very potent powerful opioid medication and I do not recommend that any patient take it.

## 2017-03-16 NOTE — TELEPHONE ENCOUNTER
Pt. trained on personal use Dexcom today; she mentioned that she uses Norco for pain 4-5 times per week . One of the active ingredients is acetaminophen which interferes with accuracy of  Dexcom sensor.  I called [address] to ask if there is a pain medi

## 2017-03-17 NOTE — TELEPHONE ENCOUNTER
Spoke with  Anson Dodson instructed that she can remain on dexcom and her current meds the aceteminophen can cause some falsely high readings but this is not enough in my opinion for her to stop using the dexcom.     Stressed if she is dosing or bolusin

## 2017-03-17 NOTE — TELEPHONE ENCOUNTER
Spoke to patient with instructions and she verbalized understanding.   She will talk more to the diabetic educator

## 2017-03-20 NOTE — PROGRESS NOTES
[facility] Progress Note      Patient Name:  Tad Henderson  YOB: 1965  Medical Record Number:  [id number]    Date of visit:  3/20/2017    CHIEF COMPLAINT: Metastatic ovarian carcinoma.     HPI:     47year old that I have seen on Subcutaneous Solution Pen-injector Inject 1.5 mg into the skin once a week. Disp: 2 pen Rfl: 0   Empagliflozin (JARDIANCE) 25 MG Oral Tab Take 25 mg by mouth daily.  Disp: 30 tablet Rfl: 2   HYDROcodone-acetaminophen (NORCO) 5-325 MG Oral Tab Take 2 tablets (Calculated - sq m): --  Pulse: 94 (1323)  BP: 121/75 mmHg (1323)  Temp: 99 °F (37.2 °C) (1323)  Do Not Use - Resp Rate: --  SpO2: --    PS:  ECO    PHYSICAL EXAM:    General: alert and oriented x 3, not in acute distress.   Accompanie prior outside imaging that was submitted for comparison, however direct comparison of size is difficult to lack of axial images from the prior study.    However as grossly seen on the prior study, the lesion appears to have decreased in size.   PANCREAS:  N Range   Glucose 169 (H) 70-99 mg/dL   BUN 16 8-20 mg/dL   Creatinine 0.89 0.55-1.02 mg/dL   GFR 75 >=60   Calcium, Total 8.8 8.3-10.3 mg/dL   Alkaline Phosphatase 96  U/L   AST 26 15-41 U/L   Alt 43 14-54 U/L   Bilirubin, Total 0.2 0.1-2.0 mg/dL   To has improved as well. She is tolerating treatment well. She will proceed with cycle #5 today. I have asked her to complete 6 cycles after which we will start maintenance Olaparib.   Discussed with patient and mother at length again today that unfortunate

## 2017-03-20 NOTE — PATIENT INSTRUCTIONS
For Dr. Rob Sharptown nurse line, call  [phone] with any questions or concerns Monday through Friday 8:00 to 4:30.   After hours or weekends for emergent needs [phone]

## 2017-03-20 NOTE — PROGRESS NOTES
Education Record  Learner:  Patient  Disease / Diagnosis:   Ovarian cancer  Barriers / Limitations:  None  Method:  Printed material and Reinforcement  General Topics:  Plan of care reviewed; schedule;  Neulasta; labs  Outcome:  Shows understanding and Mylene

## 2017-03-20 NOTE — PROGRESS NOTES
Pt here for MD f/u visit for ovarian ca. Pt reports doing well today. Some nausea the week after chemotherapy. Energy level fair. Appetite good. Denies pain. Had CT scan on Friday.      Education Record    Learner:  Patient    Disease / Viji roberts

## 2017-03-21 NOTE — TELEPHONE ENCOUNTER
Elena Chau MD  [facility]                     Call, tumor marker better      Patient made aware of results and verbalized understanding.

## 2017-03-22 NOTE — TELEPHONE ENCOUNTER
Patient complaints of one episode of vomiting after eating grapes and the compazine has not helped. Patient said she does not have the Zofran on her, so I reordered it for her and let her know she can alternate between the two every 6hrs PRN.  I also let pa

## 2017-03-29 NOTE — TELEPHONE ENCOUNTER
Patient needs a 90 day supply of insulin per her insurance  90 day supply of insulin sent to [address] [place] [address]
[address]   Pt is out of her insulin. She said pharmacist said she needs to order 90 day.
Benefits, risks, and possible complications of procedure explained to patient/caregiver who verbalized understanding and gave verbal consent.

## 2017-04-03 NOTE — PROGRESS NOTES
CC: Patient presents with:  Diabetes: follow up. pt did bring pump.       Follow up Visit     HISTORY:  Past Medical History   Diagnosis Date   • Type II or unspecified type diabetes mellitus without mention of complication, not stated as uncontrolled    • hours       Overall glucose control: A1C today at 7.7% was at 10% 12/20/16 average 167 +/- 21 mg/dl   Average Fasting glucose  143-178  mg/dl mg/dl   Average post meal glucose 148-210 mg/dl   Basal%/Bolus% 85/15 varies    Hypoglycemia frequency none   Lipo Fenofibrate 145 MG Oral Tab, Take 1 tablet (145 mg total) by mouth daily. , Disp: 90 tablet, Rfl: 0  •  Amitriptyline HCl 150 MG Oral Tab, Take 1 tablet (150 mg total) by mouth nightly., Disp: 90 tablet, Rfl: 1  •  MetFORMIN HCl 1000 MG Oral Tab, Take 1 tab Non tender, no masses, no organomegaly + lipodystrophy around [employer] area. pump cdi to left lateral abdomen  Skin: Skin is warm and dry. No rash noted. No erythema. No pallor. No open wounds noted. Psychiatric: Normal mood and affect.      Diabetes foot exa

## 2017-04-10 NOTE — TELEPHONE ENCOUNTER
Pt. called this am stating that her Dexcom was \" going crazy over the weekend every morning; BG readings 50-60. \" I checked to make sure she was checking a finger stick & she agreed. I requested she upload her pump to [email or web address]. Pt. V/u & was agreeable w/pl

## 2017-04-13 NOTE — TELEPHONE ENCOUNTER
Pt. Called to let me know she had a BG of 47 again this am. She also ran out of insulin in her pump yesterday at 12pm -6pm & didn't have time to do a infusion set change. The highest her BG went w/o any insulin was 150 mg/dL.    I told her she needs changes

## 2017-04-13 NOTE — TELEPHONE ENCOUNTER
Pt. came to office in [place] for download; was unable to download pump due to technical problems w/computer software. I was able to download Dexcom clarity reports which were e-mail scanned to DEMARIO LÓPEZ  for review.      Jayant Trejo M.A. returned amira

## 2017-04-13 NOTE — TELEPHONE ENCOUNTER
Per SC to turn down basel    2.8 12to3  2.6 3 to 10  3.0 10 to 12  Called makenzie to let her know and she will call pt.

## 2017-04-13 NOTE — TELEPHONE ENCOUNTER
Pt. called to report that she wasn't able to upload pump because she needed to call [employer] to install [place] again. She woke up in the 60's again. Instructed her to further decrease basal to 2.80 u/hr from 12a-8a & 3.25 u/hr from 8a -6p.  Pt. V/u & repeate

## 2017-04-13 NOTE — TELEPHONE ENCOUNTER
Pt. Called to request a 90 day supply of Jardiance; otherwise, it won't pay for prescription. Order pended for signature.

## 2017-04-13 NOTE — TELEPHONE ENCOUNTER
Refills sent for humalog, jardiance and trulicity to preferred pharmacy, pt needs f/u for pump adjustment please call to schedule and let know they have been sent, recommend decreasing overnight basal by 1/3.  thanks  Micaela 86

## 2017-04-17 NOTE — PROGRESS NOTES
Pt here for MD f/u visit and due for doxil. Pt c/o increase fatigue. +weakness. Decrease appetite. States \"I just don't feel good this week\". +nausea. Denies bowel problems. Denies pain.      Education Record    Learner:  Patient    Disease / Diagnosis:castro

## 2017-04-17 NOTE — PROGRESS NOTES
Education Record    Learner:  Patient    Disease / Diagnosis:dehydration/nausea    Barriers / Limitations:  None    Method:  Brief focused, printed material and  reinforcement    General Topics:  Plan of care reviewed.  Instructed pt to speak to her PA/diet

## 2017-04-17 NOTE — PROGRESS NOTES
[facility] Progress Note      Patient Name:  Renuka Brooks  YOB: 1965  Medical Record Number:  [id number]    Date of visit: 4/17/2017    CHIEF COMPLAINT: Metastatic ovarian carcinoma.     HPI:     47year old that I have seen onc Outpatient Prescriptions:  HYDROcodone-acetaminophen 5-325 MG Oral Tab  Disp:  Rfl:    Insulin Lispro (HUMALOG) 100 UNIT/ML Subcutaneous Solution INJECT UP  UNITS PER DAY VIA PUMP Disp: 9 vial Rfl: 0   Dulaglutide (TRULICITY) 1.5 QE/6.4VO Subcutaneou nightly.  Disp: 90 tablet Rfl: 1       VITALS:  Height: --  Weight: 100.517 kg (221 lb 9.6 oz) (04/17 1312)  BSA (Calculated - sq m): --  Pulse: 80 (04/17 1312)  BP: 145/83 mmHg (04/17 1312)  Temp: 97.7 °F (36.5 °C) (04/17 1312)  Do Not Use - Resp Rate: -- Eosinophil Absolute 0.05 0.00-0.30 x10(3) uL   Basophil Absolute 0.03 0.00-0.10 x10(3) uL   Immature Granulocyte Absolute 0.04 0.00-1.00 x10(3) uL   Neutrophil % 81.7 %   Lymphocyte % 10.8 %   Monocyte % 6.1 %   Eosinophil % 0.6 %   Basophil % 0.3 %   Im Renuka Brooks M.D.     [facility]    [facility] [address], [facility] [place], [id number]    4/17/2017

## 2017-04-18 NOTE — PATIENT INSTRUCTIONS
Plan: Hold trulicity during week before Chemo and if needed, after   When able to tolerate, try lower dose trulicity 8.11 mg weekly   All basal insulin doses lowered, continue metformin and jardiance.    Return in 2-3 weeks, call if any more lows or concern

## 2017-04-18 NOTE — PROGRESS NOTES
CC: Patient presents with:  Diabetes: follow up. pt did bring pump and dexcom.       Follow up Visit     HISTORY:  Past Medical History   Diagnosis Date   • Type II or unspecified type diabetes mellitus without mention of complication, not stated as uncontr Glucometer:Rod contour next      Time  12am 8:00 am 6:00pm    ICR 1:5       ISF 1:15      Basal 2.8 2.6 3.0        Target B-120 mg/dl    Insulin Action Time: 3 hours       Overall glucose control: A1C  at 7.7% was at 10% 16 average 137 +/- 61 Tab, Take 25 mg by mouth daily. , Disp: 90 tablet, Rfl: 1  •  Insulin Glargine (LANTUS SOLOSTAR) 100 UNIT/ML Subcutaneous Solution Pen-injector, 38 units twice daily in an emergency in case of insulin pump failure, Disp: 10 mL, Rfl: 0  •  Olaparib 50 MG Ora are normal.   Neck: Normal range of motion. Neck supple. Cardiovascular: Normal rate, regular rhythm and intact distal pulses. No murmur, rubs or gallops. Pulmonary/Chest: Effort normal and breath sounds normal. No respiratory distress.  No wheezes, rho dilated eye exam: up to date   Albumin/Cr ratio: no proteinuria   Monofilament exam: up to date   BP: at goal   Lipids: at goal cont statin and fenofibrate   Tobacco: n/a   Flu vaccine: needs   Pneumonia vaccine: needs   Depression screen: up to date     C

## 2017-04-24 NOTE — PATIENT INSTRUCTIONS
New Anti-nausea regime:    1. Emend given IV on Day 1. Will work over the next 72 hours. 2. Sancuso patch. Applied once weekly, Monday. Prescription sent to [facility].  Gkjak-[phone].  3. Take Prochlorperazine(Compazine) every 6 hours while tunde

## 2017-04-24 NOTE — PROGRESS NOTES
Education Record    Learner:  Patient    Disease / Diagnosis: ovarian ca    Barriers / Limitations:  None   Comments:    Method:  Reinforcement   Comments:    General Topics:  Side effects and symptom management and Plan of care reviewed.  Sancuso patch edu

## 2017-04-24 NOTE — PROGRESS NOTES
Pt comes in today with questions re: POC and whether or not she needs to complete 6th cycle of chemo that is due in 4 weeks. Riccardo Cordon NP d/w Dr. Nick Gil. Pt needs to complete 6th cycle due in 4 weeks.  She will then have a restaging scan and then likely start o never

## 2017-04-25 NOTE — TELEPHONE ENCOUNTER
Received phone call from patient stating that [address] patch had fallen off. She is awaiting patch from pharmacy. Informed patient that she may come back for an additional sample. It is ok to place surgical dressing or tape over patch.  Will try to tape on p

## 2017-04-26 NOTE — TELEPHONE ENCOUNTER
Pt came in to  the 5/325 script but pt states that actually this refill would be two weeks early and she is looking for something stronger for the pain. Discussed with Dr. Canidce Macias and dose changed along with directions.   Script printed and given to

## 2017-04-26 NOTE — TELEPHONE ENCOUNTER
Pt. reports that after reducing her 12am basal rate to 1.0 u/hr &  to 1.2 u/hr yesterday per our discussion, she is still going low. At 2am , BG dropped to 45 mg/dL;struggled to get above 80 mg/dL.  She did receive a nausea patch yesterday at the [facility]

## 2017-04-28 NOTE — TELEPHONE ENCOUNTER
Called pt let her know per SC that its ok to be off pump pt states she has been running low no more than 120 does have her dexcom with her.  Also told her that there is lantus to her local pharmacy if she is running high to get that prescription if she need

## 2017-05-04 NOTE — TELEPHONE ENCOUNTER
Pt. called to report how she is doing as far as diabetes medications. She took Trulicity 9.29 mg last Sat. 4/29/17. She has not been taking Jardiance or Lantus even though she is not using her pump. She has been using ICR taking ~5-10 units with meals. High

## 2017-05-18 NOTE — TELEPHONE ENCOUNTER
[facility] called for clarification of olaparib. Pharmacy states patient told them she would need the medication in June.

## 2017-05-22 NOTE — PROGRESS NOTES
Pt here with mother for MD f/u visit and due for chemotherapy.      Education Record    Learner:  Patient    Disease / Carol Ann Poll ca    Barriers / Limitations:  None   Comments:    Method:  Brief focused and Printed material   Comments:    General To

## 2017-05-22 NOTE — PROGRESS NOTES
[facility] Progress Note      Patient Name:  Samir Williamson  YOB: 1965  Medical Record Number:  [id number]    Date of visit: 5/22/2017    CHIEF COMPLAINT: Metastatic ovarian carcinoma.     HPI:     47year old that I have seen onc Oral Tab Take 1/2-1 tablet BID as needed Disp: 60 tablet Rfl: 0   Insulin Lispro (HUMALOG) 100 UNIT/ML Subcutaneous Solution INJECT UP  UNITS PER DAY VIA PUMP Disp: 9 vial Rfl: 0   Insulin Glargine (LANTUS SOLOSTAR) 100 UNIT/ML Subcutaneous Solution 3, not in acute distress. Accompanied by mother. HEENT: CHRISTI, oropharynx  clear. Neck: supple. No JVD /adenopathy. CVS: S1S2, regular  Rhythm, no murmurs. Lungs: Clear to auscultation and percussion.   Abdomen: Soft, non tender, no hepato-splenomega year old female that is status post preoperative chemotherapy followed by debulking surgery in 2014 and additional adjuvant treatment. She was found to have liver metastasis and peritoneal carcinomatosis in 9/16.   She has been receiving treatment at [facility]

## 2017-05-22 NOTE — PATIENT INSTRUCTIONS
For Dr. Byron Olivares nurse line, call  [phone] with any questions or concerns Monday through Friday 8:00 to 4:30.   After hours or weekends for emergent needs [phone]

## 2017-05-23 NOTE — TELEPHONE ENCOUNTER
Landy Lilly MD  [facility]                     Please call pt with tumor marker number       Left VM with results and number to call back with any questions.

## 2017-05-30 NOTE — TELEPHONE ENCOUNTER
Approved. I will be able to sign the prescription when I am back in the office on Wednesday, May 31, 2017.

## 2017-05-30 NOTE — TELEPHONE ENCOUNTER
Pt called and said she os out of Norco 10mg and she will need a refill before her next visit. She is aware Dr. Keara Posada in not here today.

## 2017-06-07 NOTE — TELEPHONE ENCOUNTER
PATIENT CALLED STATING SHE CAN NOT TOLERATE THE BARIUM FOR THE CT SCAN. ARE YOU OKAY WITH DOING IV CONTRAST ONLY? PATIENT STATES SHE DISCUSSED THIS WITH YOU ABOUT DOING THE SCAN WITH OUT THE BARIUM.  I PUT AN ORDER IN AND WILL CALL HER IF OKAY WITH YOU FIRS

## 2017-06-07 NOTE — TELEPHONE ENCOUNTER
Carol Perez MD   [facility]   2 minutes ago   (5:03 PM)    Yes, okay to do with IV contrast. Can you please change the order. Thanks. (Routing comment)    Notified patient, order placed.

## 2017-06-12 NOTE — PATIENT INSTRUCTIONS
Discontinue the Cannabis Oil. Restart Lyrica:  50mg 3 times a day for 7 days, then  75mg 3 times a day for 7 days, then  100mg 3 times a day. Call Dr. Samual Pallas to let her know how you are doing with the Lyrica.     A1c and CMP due around 7/3/2017 a

## 2017-06-12 NOTE — PROGRESS NOTES
HPI:   Tammy Mosqueda is a 47year old female    DMII:  Using Humalog if PG above 200 which has been about twice a week. Also taking Jardiance, Metformin and the Trulicity. Has labs then appt due with Justin Peralta in July 2017.     Patient has painful tablet (8 mg total) by mouth every 8 (eight) hours as needed for Nausea. Disp: 30 tablet Rfl: 3   Atorvastatin Calcium 40 MG Oral Tab Take 1 tablet (40 mg total) by mouth nightly.  Disp: 90 tablet Rfl: 1   Fenofibrate 145 MG Oral Tab Take 1 tablet (145 mg t 10-2-14    Comment Dr. Bessy Reyna    TOTAL ABDOM HYSTERECTOMY        Social History:   Smoking Status: Never Smoker                      Smokeless Status: Never Used                        Alcohol Use: No              Exercise: minimal.  Diet: Trying to RATIO      < OR = 5.0 (calc)    6.2 (H)   NON-HDL CHOLESTEROL          166 (H)   HEMOGLOBIN A1c      5.7 %   10.0 (A) 10.5 (H)       ASSESSMENT AND PLAN:   Garuang Douglas is a 47year old female     Diabetic peripheral neuropathy (hcc)  (primary encounte hyperlipidemia  Restart atorvastatin if able. - LIPID PANEL; Future    4. Chemotherapy-induced peripheral neuropathy ([address]  Norco for when symptoms are severe. Do not take cannabis oil when taking the [address] [place] [address] Pkwy.   EMLA cream to apply to feet

## 2017-06-19 NOTE — TELEPHONE ENCOUNTER
I recommend pt take the Lyrica 75mg 3 times a day for a couple of days before increasing to the prescription that was sent for 100mg 3 times a day.

## 2017-06-19 NOTE — TELEPHONE ENCOUNTER
Spoke to patient and pt was put on Lyrica 50mg TID for 1 week today was supposed to go up to 75mg TID but last night she took 2 75mg tablets and it worked and she fell asleep.   She now wants to know if she can just go up to 150mg TID now instead of going t

## 2017-06-21 PROBLEM — L03.116 CELLULITIS OF LEFT LOWER EXTREMITY: Status: ACTIVE | Noted: 2017-01-01

## 2017-06-21 NOTE — TELEPHONE ENCOUNTER
[facility] called asking to have the CT of abd/pelvis order faxed to [phone] as well as order for bun and creatine because patient does not have recent labs and is a diabetic. Order faxed. Called back to let them know as well.

## 2017-06-22 NOTE — PLAN OF CARE
Problem: Patient/Family Goals  Goal: Patient/Family Long Term Goal  Patient’s Long Term Goal:   06/21: finish raising my kids    Interventions:  - stay healthy  - See additional Care Plan goals for specific interventions   Outcome: Adequate for Discharge assistance  - Assess pain using appropriate pain scale  - Administer analgesics based on type and severity of pain and evaluate response  - Implement non-pharmacological measures as appropriate and evaluate response  - Consider cultural and social influenc

## 2017-06-22 NOTE — ED PROVIDER NOTES
Patient Seen in: THE [facility] Emergency Department In [place]    History   Patient presents with:  Cellulitis (integumentary, infectious)    Stated Complaint: bug bite    HPI    56-year-old female presents emergency department who states that she just had a 1/2-1 tablet BID as needed   Insulin Syringe-Needle U-100 (BD INSULIN SYRINGE ULTRAFINE) 31G X 5/16\" 1 ML Does not apply Misc,  Use as directed   lidocaine-prilocaine 2.5-2.5 % External Cream,  APPLY TO THE AFFECTED AREA(S) NIGHTLY   Prochlorperazine Male None (Room air)       Current:BP 98/34 mmHg  Pulse 116  Temp(Src) 100.4 °F (38 °C) (Oral)  Resp 20  Ht 160 cm (5' 3\")  Wt 95.255 kg  BMI 37.21 kg/m2  SpO2 98%  LMP 09/20/2014        Physical Exam    Vital signs reviewed  General appearance: Patient is jose ---------                               -----------         ------                     CBC W/ DIFFERENTIAL[[id number]]          Abnormal            Final result                 Please view results for these tests on the individual orders.    BLOOD CULTURE ICD-10-CM Noted POA    Cellulitis of left lower extremity L03.116 6/21/2017 Unknown

## 2017-06-22 NOTE — PAYOR COMM NOTE
--------------  ADMISSION REVIEW     Payor: Gilda Armando  #:  [id number]  Authorization Number: N/A    Admit date: 6/21/2017  7:54 PM       Admitting Physician: Leandro Carvajal MD  Attending Physician:  Violette Flaherty MD  Primary Care Phys Pulse 116  Temp(Src) 100.4 °F (38 °C) (Oral)  Resp 20  Ht 160 cm (5' 3\")  Wt 95.255 kg  BMI 37.21 kg/m2  SpO2 98%  LMP 09/20/2014        Physical Exam    Vital signs reviewed  General appearance: Patient is alert and in no acute distress  HEENT: Pupils eq (min 3 Views), Left (cpt=73630)  6/21/2017  CONCLUSION:  1. No acute osseous abnormalities. 2. Dorsal soft tissue swelling of the foot, mainly over the metatarsal regions. 3. Mild calcaneal enthesopathy. Dictated by:  Rodriguez Hill DO on 6/21/2017 at 21: affect.       Diagnostic Data:      Labs:  Recent Labs   Lab  06/21/17 2040   WBC  18.6*   HGB  12.2   MCV  82.8   PLT  220.0       Recent Labs   Lab  06/21/17 2040   GLU  292*   BUN  19   CREATSERUM  1.17*   CA  9.5   ALB  3.7   NA  135*   K  4.2   CL Oral Alek Combs RN          06/21/17 2150 100.1 °F (37.8 °C) 118 18 130/64 mmHg 98 % -- BP        06/21/17 2049 -- 116 20 98/34 mmHg -- -- BP       06/21/17 2012 100.4 °F (38 °C) 118 24 123/81 mmHg 98 %

## 2017-06-22 NOTE — PROGRESS NOTES
NURSING DISCHARGE NOTE    Discharged Home via Ambulatory. Accompanied by Family member  Belongings Taken by patient/family.     Pt d/c to home with family member at bedside, VSS, IV removed; d/c instruction gone over w/ pt and family member, Abiola Busch

## 2017-06-22 NOTE — PLAN OF CARE
METABOLIC/FLUID AND ELECTROLYTES - ADULT    • Glucose maintained within prescribed range Progressing        PAIN - ADULT    • Verbalizes/displays adequate comfort level or patient's stated pain goal Progressing        Patient/Family Goals    • Patient/Fami

## 2017-06-22 NOTE — CM/SW NOTE
06/22/17 1500   CM/SW Screening   Referral [facility] staff; Chart review;Nursing rounds   Patient's Mental Status Alert;Oriented   Patient lives with Spouse; Children   Patient Status Prior to Admission   Independent wit

## 2017-06-22 NOTE — DISCHARGE SUMMARY
[facility]  DISCHARGE SUMMARY     Samir Williamson Patient Status:  Inpatient    3/10/1965 MRN [id number]   [facility] 5NW-A Attending Colin Larkin MD   [facility] Day # 1 PCP Chon Cifuentes DO     Date of Admission: 2017  Date of (qro=[id number])    6/21/2017  CONCLUSION:  1. No acute osseous abnormalities. 2. Dorsal soft tissue swelling of the foot, mainly over the metatarsal regions. 3. Mild calcaneal enthesopathy. Dictated by:  Silverio Butterfield DO on 6/21/2017 at 21:23     Approved by 1 ML Misc   Commonly known as:  BD INSULIN SYRINGE ULTRAFINE        Use as directed    Quantity:  1 Box   Refills:  0       JARDIANCE 10 MG Tabs   Generic drug:  Empagliflozin        Take 10 mg by mouth daily.     Refills:  0       lidocaine-prilocaine 2.5- the location directed by your doctor or nurse     Bring a paper prescription for each of these medications    - Sulfamethoxazole-TMP -160 MG Tabs per tablet          Follow-up appointment:   No follow-up provider specified.     Vital signs:  Temp:  [9

## 2017-06-22 NOTE — CONSULTS
[facility]  Report of Consultation    Syed Samuels Patient Status:  Inpatient    3/10/1965 MRN [id number]   [facility] 5NW-A Attending Miguel Rosario MD   [address] Day # 1 PCP Bess Mcguire DO     Reason for Consultation:  Bob Bajwa New problem    • Morbid obesity with BMI of 40.0-44.9, adult ([facility] [address] 6/7/2015   • Ascites, malignant    • Mass of omentum    • Liver mass    • Ovarian cancer ([facility] 9/19/2014   • Secondary malignant neoplasm of liver [facility])    • Nuclear sclerosis of both eyes Glucose-Vitamin C (DEX-4) 4-0.006 g chewable tab 8 tablet, 8 tablet, Oral, Q15 Min PRN  •  Heparin Sodium (Porcine) 5000 UNIT/ML injection 5,000 Units, 5,000 Units, Subcutaneous, Q8H [address]  •  acetaminophen (TYLENOL) tab 650 mg, 650 mg, Oral, Q6H PRN  •  ond 21 06/21/2017   ALT 39 06/21/2017   PGLU 238 06/22/2017         Recent Labs   06/21/17  2341 06/22/17  0726   PGLU 209* 238*       Problem list:  Patient Active Problem List:     Hypertension     Status post total abdominal hysterectomy and bilateral salpi management prn per protocol    2. Cellulitis - IV antibiotics, improve diabetes control   3. Hyperlipidemia - continue statin, fibrate  4. Peripheral neuropathy due to chemo - lyrica 100mg tid   5. Obesity Body mass index is 37.54 kg/(m^2).  - contributory

## 2017-06-22 NOTE — PROGRESS NOTES
[facility]  Progress Note     Rhae Keys Patient Status:  Inpatient    3/10/1965 MRN [id number]   [facility] 5NW-A Attending Brant Ohara MD   [facility] Day # 1 PCP Amanda Paula DO     Chief Complaint: cellulitis    S: Patient breakfast   • Olaparib  400 mg Oral BID   • multivitamin  1 tablet Oral Daily   • pregabalin  100 mg Oral TID   • Heparin Sodium (Porcine)  5,000 Units Subcutaneous Q8H [address]   • ceFAZolin  1 g Intravenous Q8H       ASSESSMENT / PLAN:     1. L Foot/Leg Cellu

## 2017-06-22 NOTE — H&P
[facility]  History and Physical     Mariia Afia Patient Status:  Observation    3/10/1965 MRN [id number]   [facility] 5NW-A Attending Shahram Velazquez MD   Hosp Day # 0 PCP Esther Martinez DO     Chief Complaint: cellulits illicit drugs.     Family History:   Family History   Problem Relation Age of Onset   • Diabetes Father    • headaches [Other] [OTHER] Mother        Allergies: No Known Allergies    Medications:    No current facility-administered medications on file prior times daily. Disp: 480 capsule Rfl: 6   Ondansetron HCl (ZOFRAN) 8 MG tablet Take 1 tablet (8 mg total) by mouth every 8 (eight) hours as needed for Nausea.  Disp: 30 tablet Rfl: 3       Review of Systems:   A comprehensive 14 point review of systems was co to eval  3. Essential HTN  1. Continue home meds  4. Dyslipidemia  1. statin  5. Ovarian Cancer  6. Neuropathy  1.  Continue home meds      Quality:  · DVT Prophylaxis: HSQ  · CODE status: Full  · Tilley: none    Plan of care discussed with patient    Stephanielj Za

## 2017-06-22 NOTE — PROGRESS NOTES
Pt received from [place] ER via private car, alert and oriented x4, bed in low and locked position, call light within reach.

## 2017-06-23 NOTE — TELEPHONE ENCOUNTER
Pt. Informed me that she was hospitalized after going to the ER for pain in her left toe on 6/21/17. States her toe became very painful on Sunday & Monday w/o any redness or swelling. Then became red & traveled up her leg.  She also informed me she is back

## 2017-06-23 NOTE — TELEPHONE ENCOUNTER
Please initiate TCM               ----- Message -----        From: Breonna De La Paz MD        Sent: 6/23/2017   7:20 AM          To:  Jovanny Cosme DO     Subject: Inpatient Notes                                        Orders placed in system for TCM

## 2017-06-26 NOTE — PROGRESS NOTES
Initial Post Discharge Follow Up   Discharge Date: 6/22/17  Contact Date: 6/26/2017    Consent Verification:  Assessment Completed With: Patient  HIPAA Verified?   Yes    Discharge Dx:  LLE cellulitis      General:   • How have you been since your discha (LYRICA) 100 MG Oral Cap Take 1 capsule (100 mg total) by mouth 3 (three) times daily. Disp: 90 capsule Rfl: 0   Dulaglutide (TRULICITY SC) Inject into the skin.  Disp:  Rfl:    HYDROcodone-acetaminophen  MG Oral Tab Take 1/2-1 tablet BID as needed Hoang Sahu yes  o Was the new medication’s side effects discussed? yes  o Do you have any questions about your new medication?  No  • Did you  your discharge medications when you left the hospital? Yes  • May I go over your medications with you to make sure we Diabetes Services  [address] DIABETES [facility], [address] [facility]. 78, 9095 [facility] Héctor Horton, Jesus [address] [place] [phone]

## 2017-06-26 NOTE — TELEPHONE ENCOUNTER
Spoke to pt for TCM today. Pt states she is not sure if Bactrim is working or not. She states \"red streaking\" and fevers have completely resolved, but is still having redness/swelling/tenderness associated with LLE cellulitis.   Pt states left 3 middle

## 2017-06-26 NOTE — PROGRESS NOTES
HPI:    Samir Williamson is a 47year old female here today for hospital follow up.    She was discharged from Inpatient hospital, [facility] to Home   Admission Date: 6/21/17   Discharge Date: 6/22/17  Hospital Discharge Diagnosis: No Value exists f prior to visit. Current Outpatient Prescriptions on File Prior to Visit:  Dulaglutide (TRULICITY) 1.16 BR/0.8FS Subcutaneous Solution Pen-injector Inject 0.75 mg into the skin once a week.  (Patient taking differently: Inject 0.75 mg into the skin once a time. )   Olaparib 50 MG Oral Cap Take 400 mg by mouth 2 (two) times daily. (Patient taking differently: Take 400 mg by mouth 2 (two) times daily.  Have not started yet, will determine on 7-5-17 )         HISTORY: reconciled and review with patient  She  ha dizziness, light-headedness and headaches. Psychiatric/Behavioral: The patient is nervous/anxious. PHYSICAL EXAM:   Patient's last menstrual period was 09/20/2014.  Estimated body mass index is 36.17 kg/m² as calculated from the following:    Ashley this visit:    Type 2 diabetes, uncontrolled, with cellulitis of foot ([facility] [address] [address]    Type 2 diabetes, uncontrolled, with cellulitis of foot (hcc)  (primary encounter diagnosis)      1.  Type 2 diabetes, uncontrolled, with cellulitis of foot ([facility] [address]  Worsening cellu

## 2017-06-27 NOTE — CONSULTS
INFECTIOUS DISEASE CONSULTATION    Sharron Sahu Patient Status:  Inpatient    3/10/1965 MRN [id number]   [facility] 5NW-A Attending Trent Hernandez MD   Hosp Day # 1 PCP Keisha Gomez has never used smokeless tobacco. She reports that she does not drink alcohol or use drugs.     Allergies:  No Known Allergies    Medications:    Current Facility-Administered Medications:   •  CeFAZolin Sodium (ANCEF/KEFZOL) IVPB premix 2 g, 2 g, Intraveno murmurs. Abdomen: Soft, nontender, nondistended. Positive bowel sounds.   Musculoskeletal:left 2nd toe blistering, and blister ball foot, generalized erythema no fluctuance      Laboratory Data:      Recent Labs   Lab  06/27/17   0632   RBC  4.01   HGB  1 after dose of zosyn, and vand in ED  Await MRI ro om/abscess  Cefazolin 2g q8h      Sandra Lilly MD  [address] [place] [address]  [phone]

## 2017-06-27 NOTE — CONSULTS
[facility] dosing service    Initial Pharmacokinetic Consult for Vancomycin Dosing     Jacob Luna is a 47year old female admitted on 6/26/17 who is being treated for cellulitis.   Pharmacy has been asked to dose Vancomycin by Dr. Chanda Howard    She has dose.   Goal trough level 15-20 ug/mL. 3.  Pharmacy will need Scr daily while on Vancomycin to assess renal function. 4.  Pharmacy will follow and monitor renal function changes, toxicity and efficacy. Pharmacy will continue to follow her.   We mendez

## 2017-06-27 NOTE — H&P
[facility]  History and Physical     Sari Cantu Patient Status:  Inpatient    3/10/1965 MRN [id number]   [facility] 5NW-A Attending Quinn Orozco MD   Hosp Day # 0 PCP Wally Marcelo DO     Chief Complaint: foot pain drugs. Family History:   Family History   Problem Relation Age of Onset   • Diabetes Father    • headaches [OTHER] Mother        Allergies: No Known Allergies    Medications:    No current facility-administered medications on file prior to encounter. daily. (Patient taking differently: Take 200 mg by mouth daily. Have not started only take if get yeast infection from antibiotics.   No yeast infection at this time. ) Disp: 7 tablet Rfl: 0   Insulin Syringe-Needle U-100 (BD INSULIN SYRINGE ULTRAFINE) 31G deficits. CNII-XII grossly intact. Musculoskeletal: Moves all extremities.   Extremities: left foot with pus like collection,e rtyhema swelling and tenderness/ Erythema involves also forefoot and has lacy trailing to ankle  Integument: No rashes or lesions

## 2017-06-27 NOTE — DIETARY NOTE
Nutrition Short Note    Dietitian consult received for DM education. Pt visiting with family, attempted to educate pt, pt doesn't seem interested. Asked if now is not a good time, pt states \"yes come back later\".  RD to return for diabetes diet education

## 2017-06-27 NOTE — PAYOR COMM NOTE
--------------  ADMISSION REVIEW     Payor: [facility] #:  [id number]  Authorization Number: Dwana Hamilton date: 6/26/2017  5:47 PM       Admitting Physician: Marychuy Singer MD  Attending Physician:  Blessing Bunn MD  Primary Car COLONOSCOPY,DIAGNOSTIC      Comment: Dr. Karsten Leonardo  No date: INSULIN PUMP INITIATION  No date: TOTAL ABDOM HYSTERECTOMY  10-2-14: UPPER GI ENDOSCOPY,DIAGNOSIS      Comment: Dr. Karsten Leonardo    Family History:   Family History   Problem Relation Age of O (Patient taking differently: Inject 0.75 mg into the skin once a week. Weekly on saturday ) Disp: 4 pen Rfl: 3   fluconazole 200 MG Oral Tab Take 1 tablet (200 mg total) by mouth daily. (Patient taking differently: Take 200 mg by mouth daily.  Have not star or deformity. Abdomen: Soft, nontender, nondistended. Positive bowel sounds. No rebound, guarding or organomegaly. Neurologic: No focal neurological deficits. CNII-XII grossly intact. Musculoskeletal: Moves all extremities.   Extremities:[GS.1] left melva Justus Tineo MD  [facility]  [phone]    MEDICATIONS ADMINISTERED IN LAST 1 DAY:  atorvastatin (LIPITOR) tab 40 mg     Date Action Dose Route User    6/26/2017 2206 Given 40 mg Oral Maretta Bosworth, RN      CeFAZolin Sodium (ANCEF/ 1750 mg Intravenous Justin Olmos RN        Labs:  Recent Labs   Lab  06/26/17 1933 06/27/17   0632   WBC  12.2  9.1   HGB  11.2*  10.8*   MCV  84.5  84.0   PLT  236.0  212.0       Recent Labs   Lab  06/26/17 1933 06/27/17   0632   GLU  145*  135*

## 2017-06-27 NOTE — PLAN OF CARE
NURSING ADMISSION NOTE      Patient admitted via direct admit from dr. Mickey Lake. Oriented to room. Safety precautions initiated. Bed in low position. Call light in reach. Updated history and pta meds. Gave report to "[employer].  RN

## 2017-06-27 NOTE — CM/SW NOTE
06/27/17 1400   CM/SW Screening   Referral [facility] staff; Chart review;Nursing rounds   Patient's Mental Status Alert;Oriented   Patient lives with Spouse   Patient Status Prior to Admission   Independent with ADLs an

## 2017-06-27 NOTE — PROGRESS NOTES
[facility]  Progress Note     Jacob Luna Patient Status:  Inpatient    3/10/1965 MRN [id number]   [facility] 5NW-A Attending Prudencio Buerger, MD   Hosp Day # 1 PCP Kizzy Armando DO     Chief Complaint: Cellulitis    S: Patie insulin aspart  4-20 Units Subcutaneous TID CC and HS       ASSESSMENT / PLAN:     1. Left foot cellulitis, failed oral antibiotics - given Cefazolin on admission 6/21, DC on Bactrim   2. Peripheral neuropathy  1. Cefazolin  2. MRI  3.  Pain control, Rita

## 2017-06-27 NOTE — PLAN OF CARE
Diabetes/Glucose Control    • Glucose maintained within prescribed range Progressing        PAIN - ADULT    • Verbalizes/displays adequate comfort level or patient's stated pain goal Progressing        Patient/Family Goals    • Patient/Family [facility]

## 2017-06-27 NOTE — PLAN OF CARE
Diabetes/Glucose Control    • Glucose maintained within prescribed range Progressing        PAIN - ADULT    • Verbalizes/displays adequate comfort level or patient's stated pain goal Progressing        Patient/Family Goals    • Patient/Family [facility] positive     Ovarian cancer, BRCA1 positive (HCC)     Insulin pump in place     Cellulitis of left lower extremity     Type 2 diabetes, uncontrolled, with cellulitis of foot ([address]     Benign essential HTN     Dyslipidemia     Ovarian cancer ([address]     Neuro

## 2017-06-27 NOTE — CONSULTS
[facility] Pharmacy Note:  Renal Adjustment for Zosyn (piperacillin/tazobactam)    Haritha Acuna is a 47year old female who has been prescribed Zosyn (piperacillin/tazobactam) 3.375 gm every 8 hrs.   CrCl is estimated creatinine clearance is 79.8 mL/min (based

## 2017-06-28 NOTE — DIETARY NOTE
Nutrition Short Note    DM edu completed. Pt encouraged to follow up with [facility] after discharge.     Carmen Wilhelm MS, RDN, LDN  Pager [phone]

## 2017-06-28 NOTE — PLAN OF CARE
PAIN - ADULT    • Verbalizes/displays adequate comfort level or patient's stated pain goal Progressing        RISK FOR INFECTION - ADULT    • Absence of fever/infection during anticipated neutropenic period Progressing        SKIN/TISSUE INTEGRITY - ADULT

## 2017-06-28 NOTE — PROGRESS NOTES
[facility]  Progress Note     Mariana Rivera Patient Status:  Inpatient    3/10/1965 MRN [id number]   [facility] 5NW-A Attending Jamel Plata MD   Hosp Day # 2 PCP Kei Cardozo DO     Chief Complaint: Cellulitis    S: foot • Olaparib  400 mg Oral BID   • pregabalin  100 mg Oral TID   • insulin aspart  4-20 Units Subcutaneous TID CC and HS       ASSESSMENT / PLAN:     1. Left foot cellulitis, failed oral antibiotics - given Cefazolin on admission 6/21  2.  Peripheral neuropa

## 2017-06-29 NOTE — PROGRESS NOTES
[facility]                INFECTIOUS DISEASE PROGRESS NOTE    Beverlee Half Patient Status:  Inpatient    3/10/1965 MRN [id number]   [facility] 5NW-A Attending Dede Rowe MD   [facility] Day # 2 PCP Shobha Titus DO     Antib Ovarian cancer ([facility]     Neuropathy ([facility]     Cellulitis     Type 2 diabetes mellitus with complication, with long-term current use of insulin (HCC)      ASSESSMENT/PLAN:  1.  Cellulitis improving   -mri pending  Daivd Jeans, MD  [facility]

## 2017-06-29 NOTE — PAYOR COMM NOTE
--------------  CONTINUED STAY REVIEW    Payor: Gilda Armando  #:  [id number]  Authorization Number: [employer] date: 6/26/2017  5:47 PM       Admitting Physician: Kate Meade MD  Attending Physician:  Jerry Baxter MD  Primary Intravenous Boris Calvert RN    6/28/2017 1141 Given 1 mg Intravenous Raymond Giraldo RN          ASSESSMENT / PLAN:      1. Left foot cellulitis, failed oral antibiotics - given Cefazolin on admission 6/21  1. Foot appears more red this morning  2.

## 2017-06-29 NOTE — PLAN OF CARE
Diabetes/Glucose Control    • Glucose maintained within prescribed range Progressing        PAIN - ADULT    • Verbalizes/displays adequate comfort level or patient's stated pain goal Progressing        Patient/Family Goals    • Patient/Family [facility]

## 2017-06-29 NOTE — PROGRESS NOTES
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

## 2017-06-29 NOTE — PROGRESS NOTES
[facility]  Progress Note     Renuka Brooks Patient Status:  Inpatient    3/10/1965 MRN [id number]   [facility] 5NW-A Attending Meera Raymundo MD   Hosp Day # 3 PCP Cassy Demarco DO     Chief Complaint: Cellulitis    S: no co BID   • pregabalin  100 mg Oral TID   • insulin aspart  4-20 Units Subcutaneous TID CC and HS       ASSESSMENT / PLAN:     1. Left foot cellulitis, failed oral antibiotics - given Cefazolin on admission 6/21  1. Foot appears more red this morning  2.  On ce

## 2017-06-29 NOTE — PROGRESS NOTES
Pt is being discharged home with script send to pharmacy.  All f/u appts and dc meds discussed with pt she verb understanding

## 2017-06-30 NOTE — PAYOR COMM NOTE
--------------  CONTINUED STAY REVIEW    Payor: Gilda Armando  #:  [id number]  Authorization Number: Mario Anderson date: 6/26/2017  5:47 PM       Admitting Physician: Salo Yang MD  Attending Physician:  No att. providers found  Pr

## 2017-06-30 NOTE — PROGRESS NOTES
Initial Post Discharge Follow Up   Discharge Date: 6/29/17  Contact Date: 6/30/2017    Consent Verification:  Assessment Completed With: Patient  HIPAA Verified?   Yes    Discharge Dx:  LLE cellulitis    General:   • How have you been since your discharg (LYRICA) 100 MG Oral Cap Take 1 capsule (100 mg total) by mouth 3 (three) times daily.  Disp: 90 capsule Rfl: 0   [START ON 7/26/2017] HYDROcodone-acetaminophen  MG Oral Tab Take 1/2-1 tablet BID as needed (Patient taking differently: Take 1/2-1 table discussed? yes  o Do you have any questions about your new medication? No  • Did you  your discharge medications when you left the hospital? Yes  • May I go over your medications with you to make sure we are not missing anything? yes  • Are there any [facility] [address]  [address]  [facility] 100 [facility]  [facility] 154 [facility] 178, [address]  [address] [place] [address] [phone]

## 2017-06-30 NOTE — DISCHARGE SUMMARY
[facility]  DISCHARGE SUMMARY     Anson Dodson Patient Status:  Inpatient    3/10/1965 MRN [id number]   [facility] 5NW-A Attending No att. providers found   Hosp Day # 3 PCP Uriah Thompson DO     Date of Admission: 2017 (brief descriptions):  • none    Lab/Test results pending at Discharge:   · none    Consultants:  • ID    Discharge Medication List:     Discharge Medications      START taking these medications      Instructions Prescription details   cephALEXin 500 MG Ca drug:  Empagliflozin      Take 10 mg by mouth daily.    Refills:  0     lidocaine-prilocaine 2.5-2.5 % Crea  Commonly known as:  EMLA      APPLY TO THE AFFECTED AREA(S) NIGHTLY   Quantity:  30 g  Refills:  1     MetFORMIN HCl 1000 MG Tabs  Commonly known as 134/72    -----------------------------------------------------------------------------------------------  PATIENT DISCHARGE INSTRUCTIONS: See electronic chart    Khoi Reyes MD 6/30/2017    Time spent:  > 30 minutes

## 2017-07-03 NOTE — TELEPHONE ENCOUNTER
Kodi Samano from [facility] requesting copy of recent lab results.    Lab results faxed to [phone]

## 2017-07-06 NOTE — TELEPHONE ENCOUNTER
Pt. called to ask what her Lantus dose should be. Reports taking between 20-30 units of Humalog w/meals. Informed her I would check with MARIBEL Roman & return her call.

## 2017-07-07 NOTE — TELEPHONE ENCOUNTER
Pt. contacted & instructed to restart Lantus dose at 15 units every 12 hrs. Then will contact her on Monday 7/10 for BG readings & adjustments in doses. Pt. V/u & was agreeable w/plan.     She also reports she needs

## 2017-07-07 NOTE — TELEPHONE ENCOUNTER
Pt. Called to report a problem with her Dexcom; says her  telling her a battery is dead. She couldn't start the system. Offered to contact [address] [place] [address] who she is contracted w/for her insurance @ [phone].  They stated that she is due for a new transm

## 2017-07-10 NOTE — PROGRESS NOTES
Progress Note Details  Patient Name: Elia Mg Date: 7/10/2017   Patient Number: [id number] Physician / Anish Hernandez: Jasiel Tsang   Patient YOB: 1965 Facility: [facility]    Chief Complaint  This information was obtained 7/10/17-Initial visit-54 year old female presents to the clinic for left foot wound evaluation and management. PMhx of DM2, HTN, DL, ovarian cancer, neuropathy and peritoneal Mets. Pt is on oral chemo currently.  Pt states that she was admitted 6/21/17 to E This information was obtained from the chart  Patient has a medical history of:  Type II Diabetes  Stress incontinence  Ovarian cancer (2014)  Nuclear sclerosis (Both eyes)  Diabetic retinopathy (Both eyes)  Ascites  Morbid obesity  Hyperlipidemia  Liver m Prior Wound History: Other (No h/o wounds)    Additional Information  Medication reconciliation completed at today's visit. : Yes  History of chemotherapy?: Yes  Date and Type[de-identified] [id number]  History of radiation?: No    Medications  Lyrica 100 mg capsule ora Respiratory:  Normal respiratory effort ,without use of accessory muscles. Clear bilaterally, free of adventitious sounds. .     Cardiovascular:  RRR, S1, S2, . Alex +2 pedal pulses. Left foot edema .      Gastrointestinal (GI):  Soft, non-distended, non-tend Wound #2 Left, Plantar Foot is an acute Bhagat Grade 1 Diabetic Ulcer and has received a status of Not Healed. Initial wound encounter measurements are 4cm length x 5cm width with no measurable depth, with an area of 20 sq cm . No tunneling has been noted. Lipodermatosclerosis: No      Additional Information  BP (mmHg):: 130/81        Assessment    Active Problems    ICD-10  (Encounter Diagnosis) S91.301S - Unspecified open wound, right foot, sequela  (Encounter Diagnosis) E11.628 - Type 2 diabetes mellitus Perfusion assessed by palpation of pulses.  - +2 bilateral P and PT  Last A1C date and value: - 6/22/17-10.3    Last albumin date and value: - 7/10/17-3.2 and TP 8.7  No signs and symptoms of osteomyelitis present. - MRI 6/17/17 no osteo  x-ry 6/21/17 no os

## 2017-07-10 NOTE — PROGRESS NOTES
Pt here for MD f/u visit for h/o ovarian ca. Pt states she was in hospital recently for left foot cellulitis. Continues on PO Keflex. Energy level and appetite fair. Has not started Ino, states she is here to discuss medication with Dr. Francisco Harper.    Educ

## 2017-07-11 NOTE — TELEPHONE ENCOUNTER
Andreas Hester MD  [facility] [address] [facility] R[facility]             Call, tumor marker 2 points up but considered stable. Start [place] and labs in 2 weeks as yaw      Spoke to patient, verbalized understanding. Connected to PSR to schedule lab appointment.

## 2017-07-11 NOTE — TELEPHONE ENCOUNTER
LOV-4/18/17 SC  FOV-7/17/17 SC  LAST RX-historical  LAST LABS-6/22/17 a1c-10.3  PER PROTOCOL-to provider.

## 2017-07-12 NOTE — TELEPHONE ENCOUNTER
Return patient's call. She took Olaparib twice yesterday and had nausea, vomiting and significant muscle pain. Patient is not much better today. Recommend she hold off on taking medication and contact us back on 7/14 with update.   If symptoms improve, w

## 2017-07-12 NOTE — TELEPHONE ENCOUNTER
Patient called to tell us she took 1 dose of the Ino and \"all her muscles feel like they have 100lbs weights on them\" Patient states she does not think she can continue this and wants a different plan. MD to advise.

## 2017-07-12 NOTE — PROGRESS NOTES
[facility] Progress Note      Patient Name:  Ramone Lopez  YOB: 1965  Medical Record Number:  [id number]    Date of visit: 7/10/2017    CHIEF COMPLAINT: Metastatic ovarian carcinoma.     HPI:     47year old that I have seen onc tablet Rfl: 0   LANTUS SOLOSTAR 100 UNIT/ML Subcutaneous Solution Pen-injector Inject 15 Units into the skin 2 (two) times daily. Disp: 30 mL Rfl: 0   cephALEXin (KEFLEX) 500 MG Oral Cap Take 1 capsule (500 mg total) by mouth 3 (three) times daily.  Disp: 4 nightly. Disp: 90 tablet Rfl: 1   Fenofibrate 145 MG Oral Tab Take 1 tablet (145 mg total) by mouth daily. Disp: 90 tablet Rfl: 0   MetFORMIN HCl 1000 MG Oral Tab Take 1 tablet (1,000 mg total) by mouth 2 (two) times daily with meals.  Disp: 180 tablet Rfl: DIFFERENTIAL   Collection Time: 07/10/17 11:35 AM   Result Value Ref Range   WBC 8.9 4.0 - 13.0 x10(3) uL   RBC 4.25 3.80 - 5.10 x10(6)uL   HGB 11.5 (L) 12.0 - 16.0 g/dL   HCT 35.1 34.0 - 50.0 %   .0 150.0 - 450.0 10(3)uL   MCV 82.6 81.0 - 100.0 fL so comparison is somewhat limited. Given that there is no clear evidence of progression and because her tumor markers are stable, I have recommended that she start maintenance Olaparib.   Discussed again that she has incurable disease and we would likely s

## 2017-07-14 NOTE — TELEPHONE ENCOUNTER
Laly Guerrero MD  [facility]   Caller: Unspecified (Today, 10:37 AM)             Call, stay off meds and we will contact her Mon to see if she is better      Spoke to patient, verbalized understanding.

## 2017-07-14 NOTE — TELEPHONE ENCOUNTER
MD Kurtis Andino, RN             Can you please call her tomorrow to see how she is doing after 48 hr break from [place], thanks.      Adeline      Spoke to patient, achy-ness has not improved, but she was able to keep down breakfast this

## 2017-07-16 NOTE — TELEPHONE ENCOUNTER
Patient ran out of keflex and concern infection not gone, advised to schedule follow up and can resume keflex; go to ED if worsens

## 2017-07-17 NOTE — PROGRESS NOTES
Quick visit. Patient stopped in for update. Started Olaparib last Monday but had significant nausea, vomiting and muscle pain. Stopped medication and symptoms have improved.   She will start medication at very low dose again tonight and slowly escalate

## 2017-07-17 NOTE — PATIENT INSTRUCTIONS
Plan stop trulicity and jardiance   When antibiotics complete, can look at restarting jardiance.    Continue metformin 2 times daily   After 2-3 days if BG are high fasting, start lantus 10 units daily and watch closely, if lows lower by 2 units, if running

## 2017-07-17 NOTE — PROGRESS NOTES
CC: Patient presents with:  Diabetes: follow up. pt did bring dexcom.       Follow up Visit     HISTORY:  Past Medical History:   Diagnosis Date   • Arrhythmia     heart mumur   • Ascites, malignant    • High cholesterol    • History of ovarian cancer 6/7/2 information        Lab Results  Component Value Date    (H) 06/22/2017   A1C 10.3 (H) 06/22/2017       Diabetes Type: 2  Duration: 18 years   Current Meds: metformin 1000 mg bid, humalog via pump, jardiance 25 mg daily, trulicity 4.02 mg weekly    S Pen-injector, Take 1 units to lower glucose 50 points and 1 unit for every 15 grams of carb TDD up to 12 units, Disp: 20 mL, Rfl: 0  •  Insulin Pen Needle (BD PEN NEEDLE CHAPITO U/F) 32G X 4 MM Does not apply Misc, Inject 1 pen into the skin daily. , Disp: 1 B (TAB-A-JEANNIE) Oral Tab, Take 1 tablet by mouth daily. , Disp: , Rfl:   •  Prochlorperazine Maleate (COMPAZINE) 10 mg tablet, Take 1 tablet (10 mg total) by mouth every 6 (six) hours as needed for Nausea., Disp: 60 tablet, Rfl: 5  •  Ondansetron HCl (ZOFRAN) encounter. Meds & Refills for this Visit:  Signed Prescriptions Disp Refills    MetFORMIN HCl 1000 MG Oral Tab 180 tablet 1      Sig: Take 1 tablet (1,000 mg total) by mouth 2 (two) times daily with meals.       Insulin Pen Needle (BD PEN NEEDLE CHAPITO U

## 2017-07-17 NOTE — PROGRESS NOTES
Progress Note Details  Patient Name: Kiarra Degree  Date: 7/17/2017   Patient Number: [id number] Physician / Extender: Justus Kwong   Patient YOB: 1965 Facility: [facility]    Chief Complaint  This information was obtain 7/10/17-Initial visit-54 year old female presents to the clinic for left foot wound evaluation and management. PMhx of DM2, HTN, DL, ovarian cancer, neuropathy and peritoneal Mets. Pt is on oral chemo currently.  Pt states that she was admitted 6/21/17 to E Integumentary (Hair/Skin/Nails): Other (Left foot ), Dryness    Patient denies complaints or symptoms related to:   Constitutional Symptoms (General Health):  Fatigue, Fever, Loss of Appetite, Chills  Ear/Nose/Mouth/Throat: Painful or Swollen Lymph Nodes  R Wound #1 Dorsal Foot is an acute Bhagat Grade 1 Diabetic Ulcer and has received a status of Not Healed. Subsequent wound encounter measurements are 0.1cm length x 2.1cm width with no measurable depth, with an area of 0.21 sq cm .  No tunneling has been note (Encounter Diagnosis) S91.301S - Unspecified open wound, right foot, sequela  (Encounter Diagnosis) E11.628 - Type 2 diabetes mellitus with other skin complications  (Encounter Diagnosis) R60.0 - Localized edema  (Encounter Diagnosis) C56.9 - Malignant surjit Perfusion assessed by palpation of pulses.  - +2 bilateral DP and PT  Last A1C date and value: - 6/22/17-10.3    Last albumin date and value: - 7/10/17-3.2 and TP 8.7  No signs and symptoms of osteomyelitis present. - MRI 6/17/17 no osteo  x-ry 6/21/17 no o

## 2017-07-17 NOTE — TELEPHONE ENCOUNTER
Called pt to see how she was feeling today. States she is \"feeling much better today\". Dr. Deepika Terrell notified.

## 2017-07-17 NOTE — PROGRESS NOTES
Pt here for MD visit. States she is \"feeling much better today\". Dr. Cinthia Eldridge notified and will further assess pt.

## 2017-07-24 NOTE — TELEPHONE ENCOUNTER
Pt here for lab draw. Pt states she re-started Olaparib @ low dose last Monday after seeing Dr. Pritchett Strong Hina. Pt states she was to slowly escalate dose to 400 mg bid (16pills). Pt states she is only able to tolerate Olaparib 100mg daily.  Unable to increase dose

## 2017-07-25 NOTE — TELEPHONE ENCOUNTER
Pt called and said she will need to increase her Lyrica, she is on 300mg 3 times a day now. She said she was up to 600mg daily but it was not working as well as it should have been.

## 2017-07-25 NOTE — PROGRESS NOTES
ANP Visit Note    Patient Name: Ramone Lopez   YOB: 1965   Medical Record Number: [id number]   CSN: [id number]   Date of visit: 7/25/2017       Chief Complaint/Reason for Visit: Metastatic ovarian carcinoma    History of Present Illness: hysterectomy and bilateral salpingo-oophorectomy (KATIE-BSO)     Stress incontinence     Morbid obesity with BMI of 40.0-44.9, adult ([facility] [address]     Peripheral neuropathy due to chemotherapy [facility])     Diabetic peripheral neuropathy (HCC)     Ovarian cancer, unspec Lauren Berumen  No date: INSULIN PUMP INITIATION  No date: TOTAL ABDOM HYSTERECTOMY  10-2-14: UPPER GI ENDOSCOPY,DIAGNOSIS      Comment: Dr. Lauren Berumen    Allergies:  No Known Allergies    Family History:  Family History   Problem Relation Age of Onset   • mg total) by mouth nightly., Disp: 90 tablet, Rfl: 1  •  Fenofibrate 145 MG Oral Tab, Take 1 tablet (145 mg total) by mouth daily. , Disp: 90 tablet, Rfl: 0  •  Glucose Blood (ABIMBOLA CONTOUR NEXT TEST) In Vitro Strip, Test 4-6 times daily, Disp: 550 each, Rf Pulse 91   Temp 98.3 °F (36.8 °C) (Tympanic)   Resp 20   Wt 90.7 kg (200 lb)   LMP 09/20/2014   SpO2 98%   BMI 35.43 kg/m²   HEENT: EOMs intact. PERRL. Oropharynx is clear. Neck: No JVD. No palpable lymphadenopathy. Neck is supple.   Chest: Clear to auscu Date: 07/24/2017  Value: 3.3*        Ref range: 3.5 - 4.8 g/dL     Status: Final  Sodium                                        Date: 07/24/2017  Value: 137         Ref range: 136 - 144 mmol/L   Status: Final  Potassium Date: 07/24/2017  Value: 7.10*       Ref range: 1.30 - 6.70 x10 *  Status: Final  Neutrophil Absolute                           Date: 07/24/2017  Value: 7.10*       Ref range: 1.30 - 6.70 x10(*  Status: Final  Lymphocyte Absolute blood sugars better controlled and then can be titrated back up to see if better tolerated in regards to nausea. Discussed importance of managing blood sugars. Reviewed side effects of oral chemo and how to take when restarting and when and who to call.

## 2017-07-26 NOTE — TELEPHONE ENCOUNTER
Pt called, saw [facility] yesterday for dexcom download, 1 week post OV (first time I have seen her in over 4 months)   Her dexcom was showing stable BG readings, but her blood sugar in office over 400 mg/dl. She was very concerned.    She called dexcom, they adv

## 2017-07-26 NOTE — TELEPHONE ENCOUNTER
Copies of Dexcom Clarity download from yesterday e-mail scanned to DEMARIO Wang N.P. BG fingerstick in office per office meter was 342 mg/dL    Contacted Heber Robles re: the discrepancy between finger sticks, venous blood draws & sensor readings.  Repli

## 2017-07-28 NOTE — PROGRESS NOTES
Anson Dodson  : 3/10/1965 was seen for Personal Continuous Glucose Monitoring Download:    Date: 2017  Start time: 3pm End time: 4pm    Reviewed CGMS download and patient logs:  Days of sensor use:   Average glucose (mg/dL) : 136 mg/dL  Selma Daly

## 2017-07-31 NOTE — TELEPHONE ENCOUNTER
Monserrat Almendarez is calling to see if Dr Lachelle Crowley can call in a internal anti fungal cream for her, she has had a infection on her foot for the past 6 weeks, the wound care called it athlete's feet with cellulitis, nothing is working for her and it is not healing,

## 2017-08-01 NOTE — TELEPHONE ENCOUNTER
Received call from Ramone Lopez BG at 500  Mg/dl on 7/29/17 she took a bolus of 35 units of humalog. Spoke with Alo ROBISON who recommended she go to ER. Pt unable to go.  Called pt, wearing dexcom BG had lowered to 375 mg/dl was not dehydrated, n

## 2017-08-01 NOTE — PROGRESS NOTES
INFECTIOUS DISEASE PROGRESS NOTE    Lavone Blizzard     3/10/1965 MRN [id number]   Location: [facility]       PCP Peyman Welsh DO     Antibiotics: po keflex completed   Subjective:  Left foot swelling, with complication, with long-term current use of insulin (HCC)      ASSESSMENT/PLAN:  1.  Left foot edema/stasis changes  Strong pulse  Fungal infection web spaces on topical treatment  Doubt active cellulitis    Topical treatment per podiatry/po keflex to

## 2017-08-02 NOTE — TELEPHONE ENCOUNTER
MD Patria Gomez, MEGAN   Caller: Unspecified (Today,  2:52 PM)             No, she can certainly stay on Rx. I thought she was on Ino. Did she stop?       Patient stopped when she got sick and needed antibiotics but infectious disease

## 2017-08-02 NOTE — TELEPHONE ENCOUNTER
Patient states last she talked to you, you discussed that it would not be a good idea for chemo as long as she is on antbx, per another MD they are telling her it is okay. Patient is looking for clarification.

## 2017-08-03 NOTE — TELEPHONE ENCOUNTER
Patient taking Lynparza BID. Asking if can just take full dose at night (100mg) instead of splitting? Message routed to Dr. Chon Lopez. Patient will call our office tomorrow if has not heard back.   Patient aware MD out of office today

## 2017-08-03 NOTE — TELEPHONE ENCOUNTER
Patient taking Lynparza BID. Asking if can just take full dose at night (100mg) instead of splitting? Message routed to Dr. Pat Flower. Patient will call our office tomorrow if has not heard back. Patient aware MD out of office today.

## 2017-08-04 NOTE — TELEPHONE ENCOUNTER
Elena Chau MD  [address] [address] [address]   Caller: Unspecified (Yesterday,  3:33 PM)             Call, ok to do      Spoke to patient, verbalized understanding. Patient states it helps her sleep, still gets some muscle aches, but it is better this way.

## 2017-08-07 NOTE — PROGRESS NOTES
Pt here for 1 month MD f/u. Pt is currently on Lynparza 100mg at night only. Unable to tolerate any dosing higher than this, notes muscle aches are too bad in am to take the am dose, pt has been on the 100mg for about 1 week straight now.  Energy level is g

## 2017-08-07 NOTE — PROGRESS NOTES
[facility] Progress Note      Patient Name:  Renuka Brooks  YOB: 1965  Medical Record Number:  [id number]    Date of visit: 8/7/2017    CHIEF COMPLAINT: Metastatic ovarian carcinoma.     HPI:     47year old that I have seen once pruritis  : no hematuria, dysuria or frequency  Psych: no confusion or depression   Heme: no easy bruising or bleeding     ALLERGIES:  No Known Allergies    MEDICATIONS:      Current Outpatient Prescriptions:  pregabalin 300 MG Oral Cap Take 1 capsule (3 determine on 7-5-17 ) Disp: 480 capsule Rfl: 6   Ondansetron HCl (ZOFRAN) 8 MG tablet Take 1 tablet (8 mg total) by mouth every 8 (eight) hours as needed for Nausea.  Disp: 30 tablet Rfl: 3   Atorvastatin Calcium 40 MG Oral Tab Take 1 tablet (40 mg total) b - 5.1 mmol/L   Chloride 102 101 - 111 mmol/L   CO2 25.0 22.0 - 32.0 mmol/L   -CBC W/ DIFFERENTIAL   Collection Time: 08/07/17  2:29 PM   Result Value Ref Range   WBC 11.5 4.0 - 13.0 x10(3) uL   RBC 4.45 3.80 - 5.10 x10(6)uL   HGB 11.7 (L) 12.0 - 16.0 g/dL from 4.1-4.5 cm. Unfortunately her last 3 CT scans have been performed 3 different facilities so comparison is somewhat limited. She is currently on maintenance Olaparib. Will await tumor markers. If stable will continue.   If they are rising, we will h

## 2017-08-08 NOTE — TELEPHONE ENCOUNTER
Alberto Clinton MD  [address] [facility] [address] [address] [facility] [address]             Call, tumor marker stable. Continue to try increasing Lynparza dose. F/u as discussed-labs in 2 weeks and labs,MD 4 weeks. Glu high, likely contributing to her symptoms.  She should contact PCP about it

## 2017-08-14 NOTE — PROGRESS NOTES
CC: Patient presents with:  Diabetes: follow up. pt has dexcom.       Follow up Visit     HISTORY:  Past Medical History:   Diagnosis Date   • Arrhythmia     heart mumur   • Ascites, malignant    • High cholesterol    • History of ovarian cancer 6/7/2015 Meds: bydureon, trulicity, jardiance (on hold)  Downloaded (couldn't) Dexcom   medtronic 530G  Pump not in use     Overall glucose control: 6/22/17 significant worsening last A1C at 10.3% previous  A1C  at 7.7%  Dexcom shows improvement  Couldn't see full meals., Disp: 180 tablet, Rfl: 1  •  LANTUS SOLOSTAR 100 UNIT/ML Subcutaneous Solution Pen-injector, Inject 10 Units into the skin every morning., Disp: 30 mL, Rfl: 0  •  Insulin Lispro (HUMALOG KWIKPEN) 100 UNIT/ML Subcutaneous Solution Pen-injector, Take Oral Tab, Take 1 tablet by mouth daily. , Disp: , Rfl:     Exam:  /62   Pulse 92   Temp 98.1 °F (36.7 °C) (Oral)   Resp 18   Ht 62\"   Wt 209 lb   LMP 09/20/2014   Breastfeeding?  No   BMI 38.23 kg/m²   Constitutional: Oriented to person, place, and ti Lipids: at goal cont statin and fenofibrate   Tobacco: n/a   Flu vaccine: needs   Pneumonia vaccine: needs   Depression screen: up to date     Check glucoses 3-4 times daily - fasting, premeals and/or bedtime and enter in pump     Call/fax/email dexcom d

## 2017-08-14 NOTE — PROGRESS NOTES
SW completed the Treatment and self management plan request form and provided the medical update information that was requested. SW talked to patient.  Fax# [phone] [facility]

## 2017-08-16 NOTE — PROGRESS NOTES
Pilo Vale  : 3/10/1965 was seen for Diabetic Education Follow up:    Date: 8/10/2017   Start time: 3pm End time: 4pm    Assessment:     Assessment: /74   Wt 208 lb   LMP 2014   BMI 36.85 kg/m²      Spent the entire visit w/pt.  on the 10.1 %   Monocyte % 5.6 %   Eosinophil % 2.2 %   Basophil % 0.2 %   Immature Granulocyte % 0.7 %         Recommendations:      1. Follow recommended meal plan. 2. Test BG fasting, premeal & hs  4 times/day.    3. Bring glucose logs to all provider visits

## 2017-08-21 PROBLEM — Q23.1 BICUSPID AORTIC VALVE: Status: ACTIVE | Noted: 2017-01-01

## 2017-08-21 NOTE — PROGRESS NOTES
Haris Corbett is a 47year old female. HPI:       Hyperlipidemia:  Restarted atorvastatin and fenofibrate April 2017. Tolerated them both well in the past and did not have side effects such as muscle aches or pains.     Neuropathy:   Secondary to chemo ULTRAFINE) 31G X 5/16\" 1 ML Does not apply Misc Use as directed Disp: 300 each Rfl: 0   pregabalin 300 MG Oral Cap Take 1 capsule (300 mg total) by mouth 2 (two) times daily.  Disp: 180 capsule Rfl: 1   MetFORMIN HCl 1000 MG Oral Tab Take 1 tablet (1,000 [facility] ([facility]     Type 2 diabetes mellitus with both eyes affected by moderate nonproliferative retinopathy without macular edema, with long-term current use of insulin (HCC)     BRCA1 positive     Ovarian cancer, BRCA1 positive (HCC)     Insulin pump in place REVIEW OF SYSTEMS:   Review of Systems   Constitutional: Negative for appetite change, chills and fever. HENT: Negative. Eyes: Negative for visual disturbance. Respiratory: Negative for cough, chest tightness and shortness of breath.     Card months. Continue Lyrica which is greatly beneficial.  Continue Norco as needed. Topical anesthetic as needed.     - lidocaine-prilocaine 2.5-2.5 % External Cream; APPLY TO THE AFFECTED AREA(S) NIGHTLY  Dispense: 30 g; Refill: 1  - HYDROcodone-acetaminophe mouth nightly.       HYDROcodone-acetaminophen  MG Oral Tab 60 tablet 0      Sig: Take 1/2-1 tablet BID as needed      HYDROcodone-acetaminophen  MG Oral Tab 60 tablet 0      Sig: Take 1/2-1 tablet BID as needed      HYDROcodone-acetaminophen 10

## 2017-08-21 NOTE — PATIENT INSTRUCTIONS
Lifestyle Changes to Control Cholesterol  You can control your cholesterol through diet, exercise, weight management, quitting smoking, stress management, and taking your medicines right. These things can also lower your risk for cardiovascular disease. · Riding a bicycle or stationary bike  · Dancing  Managing your weight  If you are overweight or obese, your healthcare provider will work with you to help you lose weight and lower your BMI (body mass index).  Making diet changes and getting more physical · Don’t skip a dose or stop taking your medicine because you feel better or because your cholesterol numbers go down. Never stop taking your medicine unless your healthcare provider has told you it’s OK.   · Ask your healthcare provider if you have any ques © 1439-7142 [address], [address] [place]. All rights reserved. This information is not intended as a substitute for professional medical care. Always follow your healthcare professional's instructions.

## 2017-08-22 PROBLEM — B35.3 TINEA PEDIS OF LEFT FOOT: Status: ACTIVE | Noted: 2017-01-01

## 2017-08-22 NOTE — TELEPHONE ENCOUNTER
Richie Obrien MD  [address] [address] [address]             Call, labs including tumor markers stable      Patient verbalized understanding. Patient asking why she would need to increase the chemo dose if she is holding stable?  Let patient know I will check wit

## 2017-08-25 NOTE — TELEPHONE ENCOUNTER
Spoke to [employer]  Finally able to download her dexcom remotely using a code without clinic codes, and changing to current dates. BG shows spikes late pm often into mid 200's not correcting for several hours.    Stated she accidentally took a jard

## 2017-08-25 NOTE — TELEPHONE ENCOUNTER
LOV-8/14/17 SC  FOV-none  LAST RX-8/14/17 2 pen samples  LAST LABS-8/21/17 a1c-10.1  PER PROTOCOL-to provider

## 2017-09-01 NOTE — TELEPHONE ENCOUNTER
Medication(s) to Refill:   Pending Prescriptions Disp Refills    Insulin Lispro (HUMALOG KWIKPEN) 100 UNIT/ML Subcutaneous Solution Pen-injector 20 mL 0     Sig: Take 1 units to lower glucose 50 points and 1 unit for every 15 grams of carb TDD up to 12 uni

## 2017-09-01 NOTE — TELEPHONE ENCOUNTER
Pt states she is out of [email or web address]comalog. Please send one month supply to local. States [facility] sent a request today, but she is out of meds now.

## 2017-09-05 NOTE — TELEPHONE ENCOUNTER
[facility] called wanting to make sure sancuso patch was discontinued. I could not find any documentation of this, spoke to MD and she was given a sample of it, but never was prescribed. Notified diane.

## 2017-09-07 NOTE — TELEPHONE ENCOUNTER
Spoke with Ni Zhou after [employer]. Per download spiking into >180 post lunch and dinner. Overall BG average is improving, but still not to goal. Average at 197 mg/dl.  Advised to increase jardiance to 20 mg daily to use up 10 mg tabs to see

## 2017-09-11 NOTE — PROGRESS NOTES
Pt here with mother for 1mth MD f/u visit for h/o ovarian ca. Continues to take Olaparib 100mg-150mg daily. Unable to tolerate increasing dose of medication.       Education Record    Learner:  Patient    Disease / Lisandro roberts    Barriers / Isamar Corona

## 2017-09-11 NOTE — PROGRESS NOTES
[facility] Progress Note      Patient Name:  Gaurang Douglas  YOB: 1965  Medical Record Number:  [id number]    Date of visit:9/11/2017    CHIEF COMPLAINT: Metastatic ovarian carcinoma.     HPI:     47year old that I have seen once or depression   Heme: no easy bruising or bleeding     ALLERGIES:  No Known Allergies    MEDICATIONS:      Current Outpatient Prescriptions:  Insulin Lispro (HUMALOG KWIKPEN) 100 UNIT/ML Subcutaneous Solution Pen-injector 1 unit for every 15 gram of carb a tablet (1,000 mg total) by mouth 2 (two) times daily with meals. Disp: 180 tablet Rfl: 1   Prochlorperazine Maleate (COMPAZINE) 10 mg tablet Take 1 tablet (10 mg total) by mouth every 6 (six) hours as needed for Nausea.  Disp: 60 tablet Rfl: 5   Olaparib 50 - 41 U/L   Alt 22 14 - 54 U/L   Bilirubin, Total 0.2 0.1 - 2.0 mg/dL   Total Protein 8.8 (H) 6.1 - 8.3 g/dL   Albumin 3.5 3.5 - 4.8 g/dL   Sodium 137 136 - 144 mmol/L   Potassium 4.1 3.6 - 5.1 mmol/L   Chloride 104 101 - 111 mmol/L   CO2 23.0 22.0 - 32.0 m last 3 CT scans have been performed 3 different facilities so comparison is somewhat limited. She is currently on maintenance Olaparib although not full dose. Will await tumor markers. If stable will continue and repeat CT 10/17.   If they are rising, we

## 2017-09-12 NOTE — TELEPHONE ENCOUNTER
Lizzie Ruiz MD  [address]             Call, labs including tumor markers stable. Stay on [address] [place] [address]. Mathieu labs in 2 weeks and MD 4 weeks. I will see her in 4 weeks and then order CT      Spoke to patient, verbalized understanding.

## 2017-10-13 NOTE — TELEPHONE ENCOUNTER
Per SC to down load dexcom readings and stated to call pt to let her know that no changes at this time and to do follow up and labs in the next 3 to 4 weeks. Pt verbalize understanding.

## 2017-10-16 NOTE — PROGRESS NOTES
[facility] Progress Note      Patient Name:  Romayne Gemma  YOB: 1965  Medical Record Number:  [id number]    Date of visit: 10/16/2017    CHIEF COMPLAINT: Metastatic ovarian carcinoma.     HPI:     47year old that I have seen on Prescriptions:  Dulaglutide (TRULICITY) 1.5 BL/7.5ZZ Subcutaneous Solution Pen-injector Inject 1.5 mg into the skin once a week.  Disp: 6 mL Rfl: 1   LANTUS SOLOSTAR 100 UNIT/ML Subcutaneous Solution Pen-injector INJECT 15 UNITS UNDER THE SKIN TWICE A DAY D times daily. Pt taking 4 tablets at night ) Disp: 480 capsule Rfl: 6   Ondansetron HCl (ZOFRAN) 8 MG tablet Take 1 tablet (8 mg total) by mouth every 8 (eight) hours as needed for Nausea.  Disp: 30 tablet Rfl: 3   Glucose Blood (ABIMBOLA CONTOUR NEXT TEST) In Value Ref Range   WBC 11.6 4.0 - 13.0 x10(3) uL   RBC 4.58 3.80 - 5.10 x10(6)uL   HGB 10.5 (L) 12.0 - 16.0 g/dL   HCT 33.5 (L) 34.0 - 50.0 %   .0 150.0 - 450.0 10(3)uL   MCV 73.1 (L) 81.0 - 100.0 fL   MCH 22.9 (L) 27.0 - 33.2 pg   MCHC 31.3 31.0 - 3 will have to consider chemotherapy/clinical trial.    # BRCA 1 mutation: S/p KATIE/BSO. She continues with regular mammograms. Given metastatic ovarian carcinoma, do not feel that she would benefit from prophylactic mastectomy.      ORDERS PLACED:          C

## 2017-10-16 NOTE — PROGRESS NOTES
Pt here with mother for 1mth MD f/u visit for h/o ovarian ca. Continues to take Olaparib 4 tablets daily. Reports constant nausea despite taking ondansetron every 8hrs. Increase fatigue. Decrease appetite. No bowel problems.  Some bilateral foot pain.    Ed

## 2017-10-17 NOTE — TELEPHONE ENCOUNTER
Alejandra Cooper MD  [address] [address] [address]             Call, tumor marker higher, will call her after I get CT results for plan of care     Patient made aware of results and MD orders; pt verbalized understanding. Pt will call CS to schedule CT scan.

## 2017-10-20 NOTE — TELEPHONE ENCOUNTER
Júnior Olivo MD  [address] [address]   Caller: Unspecified (2 days ago,  9:55 AM)             Call, I think she said oral made her sick.  OK to skip if she cannot do it but she should if she can        Spoke with pt- she cannot tolerate oral contrast.

## 2017-10-20 NOTE — TELEPHONE ENCOUNTER
Pt has CT scheduled for 10/23 at [facility]. They need to know if pt is to have IV and Oral contrast? Last scan was done with IV only. Will have MD advise.

## 2017-10-23 NOTE — TELEPHONE ENCOUNTER
Pt calling to notify MD- CT scan done and results were uploaded today.      Message forwarded to Dr. Vanessa Welsh

## 2017-10-25 NOTE — TELEPHONE ENCOUNTER
Patient called wondering if we got the results from her scan? I'm not sure if she had something sent to you, I only see the CT scan that was ordered, but was not done.

## 2017-10-25 NOTE — TELEPHONE ENCOUNTER
Spoke to pt about CT-progression. Scheduled appt Mon to talk about next line chemo-likely Toto/Augustina or Taxol/Augustina.  She has had taxotere in 2014

## 2017-10-30 PROBLEM — D49.89: Status: ACTIVE | Noted: 2017-01-01

## 2017-10-30 NOTE — PROGRESS NOTES
[facility] Progress Note      Patient Name:  Syed Samuels  YOB: 1965  Medical Record Number:  [id number]    Date of visit:10/30/2017    CHIEF COMPLAINT: Metastatic ovarian carcinoma.     HPI:     47year old that I have seen onc Prescriptions:  Dulaglutide (TRULICITY) 1.5 DE/8.3DG Subcutaneous Solution Pen-injector Inject 1.5 mg into the skin once a week.  Disp: 6 mL Rfl: 1   LANTUS SOLOSTAR 100 UNIT/ML Subcutaneous Solution Pen-injector INJECT 15 UNITS UNDER THE SKIN TWICE A DAY D times daily. Pt taking 4 tablets at night ) Disp: 480 capsule Rfl: 6   Ondansetron HCl (ZOFRAN) 8 MG tablet Take 1 tablet (8 mg total) by mouth every 8 (eight) hours as needed for Nausea.  Disp: 30 tablet Rfl: 3   Glucose Blood (ABIMBOLA CONTOUR NEXT TEST) In 5/17.  She was currently on maintenance Olaparib 200 mg as could not tolerate full dose. Tumor markers recently . Restaging CT performed 10/23/17 unfortunately shows evidence of progression. The liver metastasis have coalesced and there is an 11.9 x 10.

## 2017-10-30 NOTE — PROGRESS NOTES
Pt here with mother for 2 week MD f/u visit for h/o ovarian ca. Pt states she is here to discuss recent CT results and POC.      Education Record    Learner:  Patient    Disease / Aida roberts    Barriers / Limitations:  None   Comments:    Method:

## 2017-10-31 NOTE — TELEPHONE ENCOUNTER
Pt. called stating she needs to make a decision regarding her diabetes medications. She has been managing her blood sugar without insulin, just Jardiance, Metformin & Trulicity.  However,  after seeing her oncologist, it was recommended that she start Chemo

## 2017-11-01 NOTE — TELEPHONE ENCOUNTER
Spoke with pt- states she talked to [address] and was told to search [facility] clinical [email or web address]. Pt states she is having trouble searching. Will have MD advise.

## 2017-11-01 NOTE — TELEPHONE ENCOUNTER
Pt called back, left VM stating that she has an appointment to discuss clinic trials, but is scheduled for chemo this coming Monday. Would like a call back from MD to discuss.      Message routed to MD.

## 2017-11-01 NOTE — TELEPHONE ENCOUNTER
Spoke to patient. She has decided to go to [facility] for possible clinical trial evaluation. We will cancel her upcoming appointment this week. She is off Olaparib. She will contact me with her decision.

## 2017-11-06 NOTE — PROGRESS NOTES
CC: Patient presents with:  Diabetes: follow up/ dexcom is not working       Follow up Visit     HISTORY:  Past Medical History:   Diagnosis Date   • Arrhythmia     heart mumur   • Ascites, malignant    • Bicuspid aortic valve 8/21/2017   • High cholestero Dexcom   medtronic 530G  Pump not in use     Overall glucose control: today at 9.7% not consistent with last several dexcom downloads, anticipated lower.  6/22/17 significant worsening last A1C at 10.3% previous  A1C  at 7.7%  Dexcom shows improvement   Lawson Torres units, Disp: 15 mL, Rfl: 0  •  Fenofibrate 145 MG Oral Tab, Take 1 tablet (145 mg total) by mouth daily. , Disp: 90 tablet, Rfl: 0  •  lidocaine-prilocaine 2.5-2.5 % External Cream, APPLY TO THE AFFECTED AREA(S) NIGHTLY, Disp: 30 g, Rfl: 1  •  atorvastatin atraumatic. Eyes: Conjunctivae are normal.   Neck: Normal range of motion. Neck supple. Cardiovascular: Normal rate, regular rhythm and intact distal pulses. No murmur, rubs or gallops.    Pulmonary/Chest: Effort normal and breath sounds normal. No resp no further questions at this time.     Paula LÓPEZ,CDE

## 2017-11-06 NOTE — TELEPHONE ENCOUNTER
Max Pathak is calling to let Dr Jaren Sánchez know that her cancer is back and is aggressive, she would like to know if Dr Jaren Sánchez can prescribe her some norco, she is in a lot of pain and needs it right away,  she has all of the information of the size of the tu

## 2017-11-06 NOTE — TELEPHONE ENCOUNTER
Left message for patient that rx for norco available for pickup-will need to show ID when picking up rx

## 2017-11-08 NOTE — TELEPHONE ENCOUNTER
S/w patient.  She has appt today at [facility] to see if she qualifies for clinical trial.  Patient wants Dr Rich Miranda to know that the lesion in her liver grew from 6 cm to 12 cm in 4 months  Dr Rich Miranda has been informed of this information

## 2017-11-10 NOTE — TELEPHONE ENCOUNTER
Spoke to patient and she said she has been unable to hold anything down. Doing ice chips. Water makes her vomit. Denies fever. Has been going off/on for week and half. Pt does not feel dehydrated.  Has not been using any new meds or no recent chemo t

## 2017-11-11 PROBLEM — E87.1 HYPONATREMIA: Status: ACTIVE | Noted: 2017-01-01

## 2017-11-11 PROBLEM — E86.0 DEHYDRATION: Status: ACTIVE | Noted: 2017-01-01

## 2017-11-11 PROBLEM — D64.9 ANEMIA, UNSPECIFIED TYPE: Status: ACTIVE | Noted: 2017-01-01

## 2017-11-11 PROBLEM — E11.10 DIABETIC KETOACIDOSIS WITHOUT COMA ASSOCIATED WITH TYPE 2 DIABETES MELLITUS (HCC): Status: ACTIVE | Noted: 2017-01-01

## 2017-11-11 NOTE — ED NOTES
Pt updated on poc. Pt sitting on edge of bed. Sts she is more comfortable. Family remains at bedside. No distress noted.

## 2017-11-11 NOTE — ED NOTES
MD at bedside for assessment. Pt sts to MD that her MD told her to stop taking her insulin because her pills were controlling her diabetes.

## 2017-11-11 NOTE — ED INITIAL ASSESSMENT (HPI)
PT to ED from Dr Sneha Jackson office for hyperglycemia. Pt reports decreased appetite over last week. Vomiting over last several days. Pt sts she has not been taking her medicine, inc insulin, in over a week.  Pt sts she has been drinking Sprite because that i

## 2017-11-11 NOTE — ED PROVIDER NOTES
Patient Seen in: [facility] Emergency Department    History   Patient presents with:  Hyperglycemia (metabolic)  Dehydration (metabolic/constitutional)    Stated Complaint: DKA ovarian CA weakness     HPI    Patient was referred from her primary car aortic valve 8/21/2017   • High cholesterol    • History of ovarian cancer 6/7/2015   • Liver mass    • Mass of omentum    • Moderate nonproliferative diabetic retinopathy of both eyes ([facility]    • Morbid obesity with BMI of 40.0-44.9, adult ([facility] 6/7/2015 normal.  Nose: Unremarkable without purulent nasal secretions or overlying sinus erythema. Throat: Posterior pharynx is normal.  Oral mucosa and lips are dry  Lungs: Clear to auscultation bilaterally. No rhonchi or rales.   Heart: Normal S1 and S2, withou The following orders were created for panel order CBC WITH DIFFERENTIAL WITH PLATELET.   Procedure                               Abnormality         Status                     ---------                               -----------         ------ the abdomen.   2. Redemonstration of hepatic metastases.   3. Worsening metastatic lymphadenopathy along the retroperitoneum and gastrohepatic ligament      I discussed case with Dr. Ann-Marie Arriaga, hospitalist.  He will admit  I discussed case with critical

## 2017-11-11 NOTE — ED NOTES
Report to [employer]. Per James Raines RN, room is being changed and is currently being cleaned. Pt updated on delay for transport to room.

## 2017-11-11 NOTE — H&P
[facility]  History and Physical     Rishabh Oh Patient Status:  Emergency    3/10/1965 MRN [id number]   Location [address] [facility] Attending Columba Mosqueda MD   Hosp Day # 0 PCP Nury Dean DO     Chief Complai  DELIVERY ONLY  10-2-14: COLONOSCOPY,DIAGNOSTIC      Comment: Dr. Bird Nielsen  No date: INSULIN PUMP INITIATION  No date: TOTAL ABDOM HYSTERECTOMY  10-2-14: UPPER GI ENDOSCOPY,DIAGNOSIS      Comment: Dr. Bird Nielsen    Social History:  reports th tablet Rfl: 3   Glucose Blood (ABIMBOLA CONTOUR NEXT TEST) In Vitro Strip Test 4-6 times daily Disp: 550 each Rfl: 3   Multiple Vitamin (TAB-A-JEANNIE) Oral Tab Take 1 tablet by mouth daily.  Disp:  Rfl:        Review of Systems:   A comprehensive 14 point review meds  4. Check A1c  5. Monitor BMP to check AG  2. Ovarian Cancer w/ liver and peritoneal carcinamatosis  1. Continue pain control  2. Oncology to [facility]  3. Pt to follow up with [facility] for further management  4.  Check CT A/P for worsening disea

## 2017-11-11 NOTE — CONSULTS
Critical Care H&P/Consult     NAME: [address]: A1/A1 - MRN: [id number] - Age: 47year old - :  3/10/1965    Date of Admission: 2017 10:22 AM  Admission Diagnosis: DKA      Assessment/Plan:  1.  DKA  - Pt recently weaned from IV to oral or sweats. No increasing abdominal girth.        Past Medical History:   Diagnosis Date   • Arrhythmia     heart mumur   • Ascites, malignant    • Bicuspid aortic valve 8/21/2017   • High cholesterol    • History of ovarian cancer 6/7/2015   • Liver mass External Cream, APPLY TO THE AFFECTED AREA(S) NIGHTLY, Disp: 30 g, Rfl: 1  •  Insulin Syringe-Needle U-100 (BD INSULIN SYRINGE ULTRAFINE) 31G X 5/16\" 1 ML Does not apply Misc, Use as directed, Disp: 300 each, Rfl: 0  •  pregabalin 300 MG Oral Cap, Take 1 MUSCULOSKELETAL: denies back pain   NEURO: denies headaches, no strokes or seizure history   PSYCHE: denies depression or anxiety   HEMATOLOGIC: denies hx of anemia   ENDOCRINE: denies thyroid history    Objective:   Intake/Output Summary (Last 24 hours) chest imaging in PACS, agree with radiology interpretation except where noted. Chest xray without focal infiltrates or opacities. CT of the abd/pelvis with extensive metastatic diease to the liver.

## 2017-11-11 NOTE — ED NOTES
Round on pt. Updated on eta to CT. Ready for CT at this time. Pt sts pain is much better. Family at bedside. 500mls 0.9NS infused at this time.

## 2017-11-11 NOTE — PROGRESS NOTES
Dillon Palomino is a 47year old female. HPI:     About a month ago she would eat and stomach would hurt. Now she is also vomiting every time she eats. Vomits 15 minutes after eating. Also vomiting fluids. Urinating 8-10 times a day.   No pain or burn (KATIE-BSO)     Stress incontinence     Morbid obesity with BMI of 40.0-44.9, adult (HCC)     Peripheral neuropathy due to chemotherapy [facility])     Diabetic peripheral neuropathy (HCC)     Ovarian cancer, unspecified laterality (HCC)     Chronic pain syndrome mellitus ([facility]     Dx at age 29   • Visual impairment       Past Surgical History:  :  DELIVERY ONLY  10-2-14: COLONOSCOPY,DIAGNOSTIC      Comment: Dr. Bessy Reyna  No date: TOTAL ABDOM HYSTERECTOMY  10-2-14: UPPER GI ENDOSCOPY,DIAGNOSIS has no wheezes. She has no rales. Abdominal: Soft. Bowel sounds are normal. She exhibits distension. There is tenderness. There is no rebound and no guarding. Musculoskeletal: Normal range of motion. She exhibits no tenderness or effusion.    Abel Fontanez #1.    3. Polyuria  As above in #1. 4. Dehydration  As above in #1.  - URINALYSIS, AUTO, W/O SCOPE  - GLUCOSE BLOOD TEST    5. Unintentional weight loss  As above in #1.             Orders Placed This Encounter      Urine Dip, auto without Micro      Acc

## 2017-11-12 PROBLEM — R10.84 GENERALIZED ABDOMINAL PAIN: Status: ACTIVE | Noted: 2017-01-01

## 2017-11-12 PROBLEM — R11.2 INTRACTABLE NAUSEA AND VOMITING: Status: ACTIVE | Noted: 2017-01-01

## 2017-11-12 NOTE — CONSULTS
[facility]  Report of Consultation    Sari Cantu Patient Status:  Inpatient    3/10/1965 MRN [id number]   [facility] 4SW-A Attending Adonis Saul MD   Hosp Day # 1 PCP Wally Marcelo DO     Reason for Consultation:    Jose De Jesus Walters cholesterol    • History of ovarian cancer 6/7/2015   • Liver mass    • Mass of omentum    • Moderate nonproliferative diabetic retinopathy of both eyes ([facility]    • Morbid obesity with BMI of 40.0-44.9, adult ([facility] 6/7/2015   • Neuropathy    • Nuclear scler currently breastfeeding. General: No acute distress. Alert and oriented x 3. HEENT: Moist mucous membranes. EOM-I. PERRL  Neck: No lymphadenopathy. No JVD. Respiratory: Clear to auscultation bilaterally. No wheezes. No rhonchi.   Cardiovascular: S1, S in the left hepatic lobe measuring up to 14.2 x 9.2 cm. Lobulated contours of the liver. BILIARY:  Unremarkable. SPLEEN:  Unremarkable. PANCREAS:  Unremarkable. ADRENALS:  Unremarkable.   KIDNEYS:  Subcentimeter hypodensity in the left lower pole kidney the celiac axis   extending into the retroperitoneum. BONES:  Degenerative changes in the spine and hips. OTHER:  None.     =====  CONCLUSION:       1.  There is worsening peritoneal carcinomatosis and increased moderate ascites in abdomen/pelvis and the progression in symptoms is noted, we may have no choice but to start after discharge but would really prefer for her to start treatment per clinical trial.    Keisha Goel M.D.     [address]

## 2017-11-12 NOTE — PROGRESS NOTES
11/12/17 0800   Gastrointestinal   Last BM Date 11/08/17   Gastrointestinal (WDL) X   Abdomen Inspection Soft;Rounded   Bowel Sounds (All Quadrants) Active   Tenderness Soft;Nontender   Passing Flatus Yes   GI Symptoms Constipation     Pt reports some c

## 2017-11-12 NOTE — PLAN OF CARE
GASTROINTESTINAL - ADULT    • Minimal or absence of nausea and vomiting Progressing        METABOLIC/FLUID AND ELECTROLYTES - ADULT    • Glucose maintained within prescribed range Progressing    • Electrolytes maintained within normal limits Progressing

## 2017-11-12 NOTE — PROGRESS NOTES
[facility] Pulmonary, Critical Care and Sleep    Beather Pickup Patient Status:  Inpatient    3/10/1965 MRN [id number]   [facility] 4SW-A Attending Sulma Avilez MD   Hosp Day # 1 PCP Cassy Demarco,        Date of Admission: 2017 1 1047  11/12/17   0357   WBC  11.2  7.9   HGB  11.0*  9.9*   HCT  35.3  32.5*   PLT  411.0  305.0     Recent Labs   Lab  11/11/17   2349  11/12/17   0357  11/12/17   0833   GLU  225*  198*  228*   BUN  8  9  9   CREATSERUM  0.50*  0.58  0.59   CA  8.4  8.1

## 2017-11-12 NOTE — PROGRESS NOTES
[facility]  Progress Note     Dilia Obando Patient Status:  Inpatient    3/10/1965 MRN [id number]   [facility] 4SW-A Attending Kyle Flower MD   Hosp Day # 1 PCP Cas Elaine DO     Chief Complaint: nausea, hyperglcy the last 72 hours. Recent Labs   Lab  11/11/17   1047   TROP  <0.046            Imaging: Imaging data reviewed in Epic.     Medications:   • pregabalin  300 mg Oral BID   • Heparin Sodium (Porcine)  5,000 Units Subcutaneous Q8H [facility]       ASSESSMENT / CHRISTIANO

## 2017-11-12 NOTE — CONSULTS
ENDOCRINOLOGY CONSULTATION    Attending physician:  Raj Kaye MD  Consulting physican:  Royce Archuleta MD    Admission Date:  11/11/2017  Consultation Date:  11/12/2017      Reason for consultation: Mgmt of Type 2 DM and DKA    Chief Complai systems is negative          Past Medical History:   Diagnosis Date   • Arrhythmia     heart mumur   • Ascites, malignant    • Bicuspid aortic valve 8/21/2017   • History of ovarian cancer 6/7/2015   • Hyperlipidemia LDL goal <100    • Hypertension    • Alray Stable Olaparib 50 MG Oral Cap Take 400 mg by mouth 2 (two) times daily. (Patient taking differently: Take 400 mg by mouth 2 (two) times daily.  Pt taking 4 tablets at night ) Disp: 480 capsule Rfl: 6   Ondansetron HCl (ZOFRAN) 8 MG tablet Take 1 tablet (8 mg to MG/ML injection 100 mL 100 mL Intravenous ONCE PRN   [] 0.9%  NaCl infusion  Intravenous Continuous   pregabalin (LYRICA) cap 300 mg 300 mg Oral BID   Heparin Sodium (Porcine) 5000 UNIT/ML injection 5,000 Units 5,000 Units Subcutaneous Q8H [facility]   ace Yes    Comment:walking 4 x week          Family History   Problem Relation Age of Onset   • Diabetes Father    • headaches [OTHER] Mother            Physical Examination:    /44 (BP Location: Right arm)   Pulse 113   Temp 98 °F (36.7 °C) 111 mmol/L 104 104 107 108   Carbon Dioxide, Total      22.0 - 32.0 mmol/L 24.0 21.0 (L) 23.0 23.0     Component      Latest Ref Rng & Units 11/11/2017 11/11/2017           8:57 PM  5:06 PM   BUN      8 - 20 mg/dL 9 10   CREATININE      0.55 - 1.02 mg/dL 0 discontinue IV insulin half an hour later. · Monitor glucoses at mealtimes and bedtime. · Hypoglycemia protocol in place. · Check lipids, urine ally/creat ratio and TSH with reflex T4 to updated the diabetic database.    · Do not restart diabetic oral

## 2017-11-13 NOTE — PROGRESS NOTES
[facility]    Progress Note    Samir Williamson Patient Status:  Inpatient    3/10/1965 MRN [id number]   [facility] 4NW-A Attending Marisol Smith MD   [facility] Day # 2 PCP Chon Cifuentes,      Subjective:  Samir Williamson is a(n) laterality [facility])     Chronic pain syndrome     Chemotherapy-induced peripheral neuropathy (HCC)     Liver metastases (HCC)     Peritoneal metastases (HCC)     Type 2 diabetes mellitus with both eyes affected by moderate nonproliferative retinopathy without progressing and is likely causing her symptoms of nausea and abdominal pain.   Improved today will need chemo break for three weeks per trial.   Patient will notify office of any issues and start date.           Sharifa RAYA-BC   [facility] Hematology Oncol

## 2017-11-13 NOTE — PROGRESS NOTES
[facility]  Progress Note     Obadiah Splinter Patient Status:  Inpatient    3/10/1965 MRN [id number]   [facility] 4SW-A Attending Guevara Mosqueda MD   Hosp Day # 2 PCP Radha Stiles DO     Chief Complaint: nausea, hyperglcy displayed. Estimated Creatinine Clearance: 148.8 mL/min (based on SCr of 0.37 mg/dL (L)). No results for input(s): PTP, INR in the last 72 hours.     Recent Labs   Lab  11/11/17   1047   TROP  <0.046         Lab Results  Component Value Date   TSH DC today with close follow up with endocrine.      /70 (BP Location: Right arm)   Pulse 111   Temp 97.8 °F (36.6 °C) (Oral)   Resp 20   Ht 160 cm (5' 3\")   Wt 204 lb 6.4 oz (92.7 kg)   LMP 09/20/2014   SpO2 98%   BMI 36.21 kg/m²   CV: RRR  PULM: CTAB

## 2017-11-13 NOTE — PAYOR COMM NOTE
--------------  ADMISSION REVIEW     Payor: [facility] #:  [id number]  Authorization Number: [id number]    Admit date: 11/11/17  Admit time: 1544       Admitting Physician: Kyle Flower MD  Attending Physician:  Kyle Flower MD [id number]]  BP: 150/51  Pulse: 101  Resp: 18  Temp: 98 °F (36.7 °C)  Temp src: Oral  SpO2: 100 %  O2 Device: None (Room air)[SS.2       ED Course[SS.1]     Labs Reviewed   COMP METABOLIC PANEL (14) - Abnormal; Notable for the following:        Result Value    G RAINBOW DRAW GOLD[SS.2]     EKG    Rate, intervals and axes as noted on EKG Report. Rate:[SS.1] 10 1 bpm[SS. 3]  Rhythm:[SS.1] Sinus Rhythm[SS. 3]  Reading:[SS.1] Sinus rhythm with nonspecific T-wave changes.[SS.3]                MDM       Chest x-ray  CO 11/12/2017 2106 Given 5000 Units Subcutaneous (Left Lower Abdomen) Murphy Barry RN    11/12/2017 1415 Given 5000 Units Subcutaneous (Left Upper Arm) Nimo Delgado RN      HYDROcodone-acetaminophen (NORCO)  MG per tab 2 tablet     Date A DKA   - Insulin ggt  - IVF   - Trend chemistries  - Pt will need to return to basal bolus insulin once glucose controlled  - Endocrine consulted   2.  Ovarian CA with known liver mets   - Continue home pain regimen  - Oncology to follow in house  - Agree wi correction of 1 Unit for every 20 above 140 with meals and bedtime                      We will give Levemir 8 Units and Novolog 10 Units SQ now and discontinue IV insulin half an hour later.        · Monitor glucoses at mealtimes and bedtime.   · Hypoglyce

## 2017-11-13 NOTE — CONSULTS
[facility]  Diabetes Clinical Nurse Specialist Consult Note    Sammiekilosola [place] Patient Status:  Inpatient    3/10/1965 MRN [id number]   [facility] 4NW-A Attending Axel Anderson MD   Hosp Day # 2 PCP Kizzy Armando DO     Reason 11/11/17   2349  11/12/17   0357  11/12/17   0833  11/12/17   1202  11/13/17   0628   CREATSERUM  0.61  0.54*  0.50*  0.58  0.59  0.41*  0.37*     HGBA1C:    Lab Results  Component Value Date   A1C 11.7 (H) 11/12/2017   A1C 9.7 (A) 11/06/2017   A1C 10.1 (H explained that Dr. Didier Loo is not with [facility], these issues seem to be resolved. She is unsure whether to go back to see Kay Harrell or to see Dr. Didier Loo. She was unclear if Dr. Irene Da Silva office took her insurance.  I gave her a list of the insurance plans that

## 2017-11-13 NOTE — PROGRESS NOTES
ENDOCRINOLOGY PROGRESS NOTE    Typed by Chepe Gonzalez MD on 11/13/2017      S:  Pt moved to stepdown floor. Sitting up in the chair - tolerating diet. Anticipates going home today.       O:  /70 (BP Location: Right arm)   Pulse 111   Temp 9 3.80 - 5.10 x10(6)uL 4.55   Hemoglobin      12.0 - 16.0 g/dL 10.0 (L)   Hematocrit      34.0 - 50.0 % 32.8 (L)   Platelet Count      327.9 - 450.0 10(3)uL 324.0           Assessment and Plan:    1.  Type 2 DM with neuropathy and moderate nonproliferative Jaxon Tuttle MD  Endocrinology, Diabetes and Metabolism  [facility]

## 2017-11-13 NOTE — PLAN OF CARE
Pt discharged to home Alert x4 she verbalizes a understanding in regards to carb counting and correction factor.  Pt instructed to call primary MD if she has any persistent vomiting and notify endocrinology if Blood sugars are greater than >300 or less than

## 2017-11-14 NOTE — PAYOR COMM NOTE
--------------  DISCHARGE REVIEW    Payor: Gilda Armando  #:  [id number]  Authorization Number: [id number]    Admit date: 11/11/17  Admit time:  1544  Discharge Date: 11/13/2017  3:54 PM     Admitting Physician: Jackie Briscoe MD  Attendi

## 2017-11-14 NOTE — DISCHARGE SUMMARY
[facility]OSPITALIST  DISCHARGE SUMMARY     Candice Perry Patient Status:  Inpatient    3/10/1965 MRN [id number]   [facility] 4NW-A Attending No att. providers found   Hosp Day # 2 PCP Chandan Ordonez DO     Date of Admission: 2017 chemotheraphy around that time as well. She denies any fevers or chills. She denies any CP or SOB. She has had very poor PO intake. She feels generalized weakness but no focal weakness, numbness or tingling.     Brief Synopsis:   Patient admitted with 3 4 w discharged home today.     Procedures during hospitalization:   • None    Incidental or significant findings and recommendations (brief descriptions):  • Follow-up with Dr. Yesi Garcia in [facility] center   • follow-up with oncology And trial study    Lab/Test resu Nausea. Quantity:  30 tablet  Refills:  3     pregabalin 300 MG Caps  Commonly known as:  LYRICA      Take 1 capsule (300 mg total) by mouth 2 (two) times daily.    Quantity:  180 capsule  Refills:  1     Prochlorperazine Maleate 10 mg tablet  Commonly kn

## 2017-11-14 NOTE — PROGRESS NOTES
Chaya Crespo, DO Jan Villalba, MEGAN             TCM, and have pt see me within 7 days     Orders placed in system

## 2017-11-15 NOTE — TELEPHONE ENCOUNTER
Spoke to pt for TCM today. Pt states she still is not feeling well. She states she is still vomiting at least twice daily- usually after eating and is nauseous constantly despite taking Zofran and Compazine.   Able to tolerate liquids OK, but almost alway

## 2017-11-15 NOTE — PROGRESS NOTES
Initial Post Discharge Follow Up   Discharge Date: 11/13/17  Contact Date: 11/15/2017    Consent Verification:  Assessment Completed With: Patient  HIPAA Verified?   Yes    Discharge Dx:  DKA, Type 2 DM    General:   • How have you been since your discha Cap Take 1 capsule (300 mg total) by mouth 2 (two) times daily. Disp: 180 capsule Rfl: 1   Prochlorperazine Maleate (COMPAZINE) 10 mg tablet Take 1 tablet (10 mg total) by mouth every 6 (six) hours as needed for Nausea.  Disp: 60 tablet Rfl: 5   Ondansetron insulin use. TE sent to PCP office. Follow up appointments:   HFU appt reviewed with pt. She denies any barriers to keeping/getting to HFU appt. Pt aware she needs to schedule f/u with DM educator, endo, and onc.   She states she will call to sche sent to PCP.

## 2017-11-16 NOTE — TELEPHONE ENCOUNTER
Spoke to patient and she said the vomiting has gotten better a little bit. After discussing with Dr. Akua Ramirez    1) after discussing with Dr. Akua Ramirez pt to  try the oil in addition to what she already has but call Francisco Ferrera  [facility] first to see if it is ok

## 2017-11-17 NOTE — PROGRESS NOTES
HPI:    Barry Bell is a 47year old female here today for hospital follow up.    She was discharged from Inpatient hospital, [facility] to Home   Admission Date: 11/11/17   Discharge Date: 11/13/17  Hospital Discharge Diagnosis: DKA  TCM Diag as needed for Nausea. Glucose Blood (ABIMBOLA CONTOUR NEXT TEST) In Vitro Strip Test 4-6 times daily   Multiple Vitamin (TAB-A-JEANNIE) Oral Tab Take 1 tablet by mouth daily.    Insulin Syringe-Needle U-100 (BD INSULIN SYRINGE ULTRAFINE) 31G X 5/16\" 1 ML Does Mood could be better       PHYSICAL EXAM:   Patient's last menstrual period was 09/20/2014. Estimated body mass index is 37.02 kg/m² as calculated from the following:    Height as of this encounter: 63\". Weight as of this encounter: 209 lb.     long-term current use of insulin ([facility]  Patient status post hospitalization for DKA, she had been off of her insulin for a few months. During and since hospitalization patient has restarted her insulin. Plasma glucose still running in uncontrolled range. Detail Level: Zone Include Location In Plan?: No

## 2017-11-18 PROBLEM — N30.01 ACUTE CYSTITIS WITH HEMATURIA: Status: ACTIVE | Noted: 2017-01-01

## 2017-11-18 PROBLEM — R18.0 MALIGNANT ASCITES: Status: ACTIVE | Noted: 2017-01-01

## 2017-11-18 PROBLEM — R10.9 ABDOMINAL PAIN, ACUTE: Status: ACTIVE | Noted: 2017-01-01

## 2017-11-18 NOTE — ED INITIAL ASSESSMENT (HPI)
Patient has hx of ovarian cancer with liver mets- requires frequent paracenteses. States she has had increased ascites, which is causing her abd pain.  Has not been tapped in [place] while\" (unsure of date)

## 2017-11-19 NOTE — PROCEDURES
[facility]  Procedure Note    Candice Perry Patient Status:  Inpatient    3/10/1965 MRN [id number]   [facility] 4NW-A Attending Jessica Howard MD   Hosp Day # 1 PCP Chandan Ordonez DO     Procedure: US guided paracentesis    Pre-P

## 2017-11-19 NOTE — CONSULTS
Hematology/Oncology Initial Consultation Note    Patient Name: Joni Graft Record Number: [id number]    YOB: 1965   Date of Consultation: 11/19/2017   Physician requesting consultation: Dr. Génesis Moreno    [facility] for [facility] by Dr. Felicita Severe for ovarian cancer with clinical course summarized as above. She has not yet started the clinical trial as above as she was hospitalized here last week from 11/11- 11/13 for DKA. She reports her BG has been better at home since discharge.   D Inpatient Medications:  Inpatient Meds:  • cefTRIAXone  1 g Intravenous Q24H   • [START ON 11/20/2017] enoxaparin  40 mg Subcutaneous Daily   • insulin detemir  8 Units Subcutaneous [email or web address]   • Insulin Aspart Pen  2-10 Units Subcutaneous TID CC and H Lungs clear to auscultation bilaterally  GI/Abd: Soft, non-tender with normoactive bowel sounds, nondistended.     Neurological: Grossly intact   Lymphatics: No palpable lymphadenopathy  Skin: no rashes or petechiae    Laboratory:  Recent Labs   Lab  11/18/ periaortic adenopathy noted abutting the aorta measures 2.5 x 2.1 cm. No significant change. BOWEL/MESENTERY:  There is a large amount of abdominal and pelvic ascites. No vessels are identified.  There is omental carcinomatosis especially in the left upper hydroureter. Impression & Plan:     *metastatic ovarian cancer  -followed by Dr. Yarely Santamaria. Recent progression of disease. Has plans to start clinical trial at [facility] with Dr. Can Mendez next.       *malignant ascites  -s/p LVP today with symptomatic improve

## 2017-11-19 NOTE — ANESTHESIA POSTPROCEDURE EVALUATION
[address] Patient Status:  Inpatient   Age/Gender 47year old female MRN [id number]   [facility] SURGERY Attending Philip Llamas MD   [facility] Day # 1 PCP Adelaide Herrera DO       Anesthesia Post-op Note    Procedure(s

## 2017-11-19 NOTE — PLAN OF CARE
Patient returned from paracentesis,dressing dry and intact. vitals stable,patient tolerating orange juice

## 2017-11-19 NOTE — PROGRESS NOTES
[facility]  Progress Note     Beverlee Half Patient Status:  Inpatient    3/10/1965 MRN [id number]   [facility] 4NW-A Attending Tiffanie Rincon MD   Hosp Day # 1 PCP Shobha Titus DO     Chief Complaint: abd pain    S: Patient Albumin Human  25 g Intravenous Once   • insulin detemir  8 Units Subcutaneous [email or web address]   • Insulin Aspart Pen  2-10 Units Subcutaneous TID CC and HS       ASSESSMENT / PLAN:     1.  Tense malignant ascites s/p diagnostic and therapeutic paracentesis

## 2017-11-19 NOTE — PLAN OF CARE
Patient left for procedure per cart, surgery notified of blood sugar result prior to leaving floor of 191. Patient has been npo since 1100. Denies having any questions concerning procedure.

## 2017-11-19 NOTE — H&P
[facility]  History and Physical     Esta Bundestiny Patient Status:  Inpatient    3/10/1965 MRN [id number]   [facility] 4NW-A Attending Silvia Mann MD   Hosp Day # 0 PCP Berenice Pallas, DO     Chief Complaint: abd pain    H Allergies: No Known Allergies    Medications:    No current facility-administered medications on file prior to encounter.    Current Outpatient Prescriptions on File Prior to Encounter:  HUMALOG KWIKPEN 100 UNIT/ML Subcutaneous Solution Pen-injector T BMI 37.38 kg/m²   General: No acute distress. Alert and oriented x 3. HEENT: Normocephalic atraumatic. Moist mucous membranes. EOM-I. PERRLA. Anicteric. Neck: No lymphadenopathy. No JVD. No carotid bruits. Respiratory: Clear to auscultation bilaterally. discussed with patient and ER physician    Marco Mcdonald MD  11/18/2017

## 2017-11-19 NOTE — CONSULTS
[facility]  Report of Consultation    Mariia Dacreinier Patient Status:  Inpatient    3/10/1965 MRN [id number]   [facility] 4NW-A Attending Myra Duncan MD   Hosp Day # 1 PCP Esther Martinez DO     Reason for Consultation:  RIGHT HY Sodium (ROCEPHIN) 1 g in sodium chloride 0.9 % 100 mL IVPB-minibag/addvantage, 1 g, Intravenous, Q24H  •  [START ON 11/20/2017] Enoxaparin Sodium (LOVENOX) 40 MG/0.4ML injection 40 mg, 40 mg, Subcutaneous, Daily  •  lactated ringers infusion, , Intravenous 11/19/2017    11/19/2017   CA 8.5 11/19/2017   ALB 1.9 11/18/2017   ALKPHO 170 11/18/2017   BILT 0.3 11/18/2017   TP 7.1 11/18/2017   AST 48 11/18/2017   ALT 10 11/18/2017   PTT 30.9 11/19/2017   INR 1.18 11/19/2017   PTP 15.1 11/19/2017   LIP 63 11 Diabetic ketoacidosis without coma associated with type 2 diabetes mellitus (HCC)     Hyponatremia     Anemia, unspecified type     Dehydration     Generalized abdominal pain     Intractable nausea and vomiting     Anemia     Abdominal pain, acute     [facility] [place]

## 2017-11-19 NOTE — ANESTHESIA PREPROCEDURE EVALUATION
PRE-OP EVALUATION    Patient Name: Riccardo Georges    Pre-op Diagnosis: Ureter obstruction [N13.5]    Procedure(s):  Cystoscopy, right retrograde with pyelogram, insertion right ureteral stent    Surgeon(s) and Role:     Kylee Carrasco MD - Primary (DEX-4) 4-0.006 g chewable tab 8 tablet 8 tablet Oral Q15 Min PRN   insulin detemir (LEVEMIR) 100 UNIT/ML flextouch 8 Units 8 Units Subcutaneous [email or web address]   Insulin Aspart Pen (NOVOLOG) 100 UNIT/ML flexpen 2-10 Units 2-10 Units Subcutaneous TID CC and insulin      (+) anemia                   Pulmonary                           Neuro/Psych  Comment: neuropathy                            Ovarian CA w/ mets      Past Surgical History:  :  DELIVERY ONLY  10-2-14: Rashmi Flower

## 2017-11-19 NOTE — ED PROVIDER NOTES
Patient Seen in: [facility] Emergency Department    History   Patient presents with:  Abdomen/Flank Pain (GI/)    Stated Complaint: ascites/abd pain    HPI    21-year-old white female who presents emergency room today for complaint of abdominal pa Alcohol use: No                Review of Systems    Positive for stated complaint: ascites/abd pain  Other systems are as noted in HPI. Constitutional and vital signs reviewed.       All other systems reviewed and negative except as note Abnormal; Notable for the following:     Urine Color Patricia (*)     Clarity Urine Cloudy (*)     Glucose Urine 50  (*)     Blood Urine Large (*)     Protein Urine 30  (*)     Leukocyte Esterase Urine Moderate (*)     WBC Urine 11-20 (*)     RBC URINE >10 Lyndel Boot narrowing of the left portal vein from mass effect. No portal vein thrombosis   identified. BILIARY:  No visible dilatation or calcification.    PANCREAS:  The pancreas is atrophic. No pancreatic mass or enlargement.   SPLEEN:  No enlargement or focal lesi bilateral hips. No bony metastatic disease identified. LUNG BASES:  No visible pulmonary or pleural disease.    OTHER:  Negative.        Impression     CONCLUSION:    1. Extensive metastatic disease again noted.  There is worsening perigastric peritoneal c acute R10.9 11/18/2017 Unknown

## 2017-11-19 NOTE — BRIEF OP NOTE
Pre-Operative Diagnosis: Left ureter obstruction [N13.5], L. hydronephrosis     Post-Operative Diagnosis: Left ureter obstruction [N13.5], L. Hydronephrosis, urethral stenosis     Procedure Performed:   Procedure(s):  Cystoscopy, right retrograde with p

## 2017-11-19 NOTE — ED NOTES
Full report given to [facility] shanon NORIEGA stable and ready for transport to [facility]

## 2017-11-19 NOTE — PLAN OF CARE
CARDIOVASCULAR - ADULT    • Maintains optimal cardiac output and hemodynamic stability Progressing        RISK FOR INFECTION - ADULT    • Absence of fever/infection during anticipated neutropenic period Progressing        SAFETY ADULT - FALL    • Free from

## 2017-11-20 NOTE — PLAN OF CARE
Patient returned from cysto/ right retrograde pyelogram, and dilatation of urethra, no dangler. Denies any pain, vitals stable. Dressing intact to site of paracentesis earlier in am, no drainagenoted, scd's in place.

## 2017-11-20 NOTE — PAYOR COMM NOTE
--------------  ADMISSION REVIEW     Payor: Gilda Armando  #:  [id number]  Authorization Number: Sony Flores date: 11/18/17  Admit time: 2152       Admitting Physician: Socorro Lazo MD  Attending Physician:  Rishabh Griffin MD  Pr Protein Urine 30  (*)     Leukocyte Esterase Urine Moderate (*)     WBC Urine 11-20 (*)     RBC URINE >10 (*)     Bacteria Urine 3+ (*)     Squamous Epi.  Cells Large (*)     Hyaline Casts Present (*)     Mucous Urine 3+ (*)     All other components within ([facility] [address]  Malignant ascites  Hyponatremia[EP.2]        ASSESSMENT / PLAN:[FA.1]     1. Malignant peritoneal ascites  1. Paracentesis  2. Pain control  2. Metastatic ovarian cancer  1. Status post KATIE/BSO in 2014[FA.2]  2.  CT with worsening peritoneal carcinom 11/20/2017 0809 Given 8 Units Subcutaneous (Left Lower Abdomen) Anuj Calixto RN    11/19/2017 2224 Given 8 Units Subcutaneous (Left Upper Abdomen) Holly Harris RN    11/19/2017 1157 Given 8 Units Subcutaneous (Left Upper Abdomen) Yue Melvin tomorrow     PLEASE FAX DAYS CERTIFIED AND NEXT REVIEW DATE

## 2017-11-20 NOTE — PLAN OF CARE
Dr Kristin Regalado notified that patient will be having a cysto today, and is now npo and md ordered kayexalate, for a potassium of 5.4.  Received an order to discontinue medication today, and md will recheck potassium level tomorrow

## 2017-11-20 NOTE — PROGRESS NOTES
[facility]  Progress Note    Tammy Mosqueda Patient Status:  Inpatient    3/10/1965 MRN [id number]   [facility] 4NW-A Attending Amna Omer MD   Hosp Day # 2 PCP Araceli Parson,        SUBJECTIVE:    Better today.     OBJECTIVE edema. Neurological: Grossly intact. RADIOLOGY: I reviewed images myself and agree with interpretation. ASSESSMENT/PLAN:    # Ascites: 5/6L drained 11/19/17. Remains at risk for reaccumulation of ascites.   Will arrange for outpatient evaluatio

## 2017-11-20 NOTE — DIETARY NOTE
Nutrition Short Note    Dietitian consult received per stroke protocol. Pt is currently at low nutritional risk. Hemoglobin A1C 11.2. Pt is on a Regular/ General diet. Will continue to monitor and follow pt nutritional status. RD available PRN.     Jacque Counter

## 2017-11-20 NOTE — PLAN OF CARE
Patient assisted up to chair with one, tolerated well. Patient tolerating clear liquids, and now is starting on a regular tray,denies pain, vitals stable.

## 2017-11-20 NOTE — PLAN OF CARE
GENITOURINARY - ADULT    • Absence of urinary retention Progressing        PAIN - ADULT    • Verbalizes/displays adequate comfort level or patient's stated pain goal Progressing        SAFETY ADULT - FALL    • Free from fall injury Progressing          Shadia Bark

## 2017-11-20 NOTE — CM/SW NOTE
11/20/17 1500   CM/SW Referral Data   Referral Source Physician   Reason for Referral Discharge planning   Informant Patient   Readmission Assessment   Did you know who and how to call someone if you felt worse?  Yes   Did you understand your discharge i several positive things her life. Pt stated her family is supportive and she is looking forward to the holidays and her sons 23th birthday. SW provided the pt w/cousneling resources and SW's contact information. SW to follow up if any other needs arise.

## 2017-11-20 NOTE — PROGRESS NOTES
emla cream to left foot for neuropathy. Vitals stable. Cr improved. Will give dose of kayexelate for her hyperkalemia and recheck bmp at 3pm.  Last dose of rocephin today. If potassium stable or improving this afternoon, then will d/c this afternoon.

## 2017-11-20 NOTE — PROGRESS NOTES
[facility]  Urology Progress Note    Pilo Collazo Patient Status:  Inpatient    3/10/1965 MRN [id number]   [facility] 4NW-A Attending Tiffanie Rincon MD   [address] Day # 2 PCP Shobha Titus,      Subjective:  Pilo Collazo is a(

## 2017-11-21 NOTE — PROGRESS NOTES
RN received call from PCT approximately 1015 to come to pt's room stat. Pt on floor of bathroom, laying on left side. BL knees appeared reddened. Pt states her \"knees buckled\" and fell to ground. Pt reports no injury to head. VSS.  Reports no dizziness or

## 2017-11-21 NOTE — PROGRESS NOTES
[facility]  Progress Note     Rishabh Oh Patient Status:  Inpatient    3/10/1965 MRN [id number]   [facility] 4NW-A Attending Gladis Brunner MD   Hosp Day # 3 PCP Nury Dean DO     Chief Complaint: abd pain    S: Patient mg/dL). Recent Labs   Lab  11/19/17   0733   PTP  15.1*   INR  1.18*       No results for input(s): TROP, CK in the last 72 hours. Imaging: Imaging data reviewed in Epic.     Medications:   • insulin detemir  15 Units Subcutaneous [email or web address]

## 2017-11-21 NOTE — IMAGING NOTE
Pre procedure instruction given. Clear liquid till the procedure. PT/INR to be done. Hold all blood thinner. Pt is can consent.   Tentative time for the procedure is 1300

## 2017-11-21 NOTE — CONSULTS
[facility]      Endocrinology Consultation    Kimberly Saunders Patient Status:  Inpatient    3/10/1965 MRN [id number]   [facility] 4NW-A Attending Chepe Paulson MD   [address] Day # 3 PCP Cher Hooper DO     Reason for Consultation: as needed Disp: 60 tablet Rfl: 0 11/18/2017 at Unknown time   Insulin Syringe-Needle U-100 (BD INSULIN SYRINGE ULTRAFINE) 31G X 5/16\" 1 ML Does not apply Misc Use as directed Disp: 300 each Rfl: 0 11/18/2017 at Unknown time   Prochlorperazine Maleate (COM COLONOSCOPY,DIAGNOSTIC      Comment: Dr. Charley Bosworth  No date: TOTAL ABDOM HYSTERECTOMY  10-2-14: UPPER GI ENDOSCOPY,DIAGNOSIS      Comment: Dr. Charley Bosworth  Family History   Problem Relation Age of Onset   • Diabetes Father    • headaches Chava Crass Mother proptosis, lid lag, or stare, conjunctiva pink, pupils equal and reactive to light  ENT: moist mucous membranes  Neck:  no thyromegaly or palpable nodules, no thyroid bruit, trachea midline  Lymph: no anterior cervical or supraclavicular lymphadenopathy  C or concerns.      Mariano Espinosa MD  Endocrinology, Diabetes, and Metabolism  [facility]

## 2017-11-21 NOTE — PLAN OF CARE
Maintains optimal cardiac output and hemodynamic stability Progressing    Pt put on cardiac monitoring this evening due to elevated K.     Verbalizes/displays adequate comfort level or patient's stated pain goal Progressing     Pt having pain 8/10 in L melva

## 2017-11-21 NOTE — IMAGING NOTE
US guided paracentesis with Dr. Elisabeth Ryder. Pt tolerated well. Vss. BS rechecked, 376. Pt developed emesis prior to transport to room 403, pt states she may have vomited her medication with this emesis. Report to MEGAN Donaldson, aware of above.

## 2017-11-21 NOTE — PROGRESS NOTES
Pt AOx4. No c/o pain in L foot peripheral neuropathy. Underwent paracentesis today, tolerated well. C/o N/V--given prn zofran with relief. Decreased appetite. BG critically high--endocrinology paged, insulin parameters changed.  Showing improvement in

## 2017-11-21 NOTE — PROGRESS NOTES
[facility]  Progress Note     Ni Zhou Patient Status:  Inpatient    3/10/1965 MRN [id number]   [facility] 4NW-A Attending Gerald Vieyra MD   Hosp Day # 3 PCP Alejandra Duckworth DO     Chief Complaint: abd pain    S: Patient 0.3   --   0.3   --    --    TP  7.1   --   5.2*   --    --     < > = values in this interval not displayed. Estimated Creatinine Clearance: 57.4 mL/min (based on SCr of 0.96 mg/dL).     Recent Labs   Lab  11/19/17   0733   PTP  15.1*   INR  1.18*

## 2017-11-21 NOTE — TELEPHONE ENCOUNTER
Copies of Clarity report faxed to Dr. Didier Loo @[phone]. Please let me know if anything else is needed.

## 2017-11-21 NOTE — PROGRESS NOTES
BG before lunchtime was 463, recheck was 450. Endocrinology paged. Orders to give 20u novolog and recheck at 1400. Ok to go down to paracentesis.

## 2017-11-21 NOTE — PROGRESS NOTES
[facility]  Progress Note    Samir Williamson Patient Status:  Inpatient    3/10/1965 MRN [id number]   [facility] 4NW-A Attending Fred Ty MD   Hosp Day # 3 PCP Chon Cifuentes DO       SUBJECTIVE:    Groggy and more distended 2-10 Units Subcutaneous TID CC and HS     influenza virus vaccine PF, HYDROmorphone HCl PF **OR** HYDROmorphone HCl PF **OR** HYDROmorphone HCl PF, ondansetron HCl    PHYSICAL EXAM:    General: Patient is alert and oriented x 3, not in acute distress.   Ja Chimera the patient during this encounter and over  50% of that time  was spent face-to-face on counseling and coordination of care. We discussed  patient’s prognosis, treatment options available for metastatic ovarian ca, including side effects of treatments.   Tracey King

## 2017-11-21 NOTE — OPERATIVE REPORT
[facility]    PATIENT'S NAME: Scott Marquez   ATTENDING PHYSICIAN: Michelle Mccormick M.D. OPERATING PHYSICIAN: Duncan Tejeda M.D.    PATIENT ACCOUNT#:   [de-identified]    LOCATION:  [address] [place] [address]  MEDICAL RECORD #:   [id number]       DATE OF BIRTH:  [birth year] DURING PROCEDURE:  None. COMPLICATIONS:  None. CONDITION:  Good. SPECIMENS:  None. DRAINS:  A 6-Polish 24 cm double-J stent.       Dictated By Jason Person M.D.  d: 11/20/2017 14:33:13  t: 11/21/2017 00:19:40  [facility] [id number]  /

## 2017-11-22 NOTE — PROGRESS NOTES
No change overnight. Lethargic bur rousable. Levemir and novolog given per Orders. Iv saline lock.  No Bm's overnight

## 2017-11-22 NOTE — PROGRESS NOTES
Dr Gabriel Howard notified of K of 6.4, new orders, EKG (done), Dextrose and insulin ordered. (waiting for novolin from pharmacy)  Nebuliser ordered, Resp aware

## 2017-11-22 NOTE — CM/SW NOTE
SW met w/pt regarding KELSEY recommendation. Pt stated she would like to go to [facility] in [place]. SW requested DON. SW will sent PT notes once they are available. Not sure when [facility] will be able to get insurance auth.  Pt is concerned about how she wi

## 2017-11-22 NOTE — PROGRESS NOTES
[facility]  Progress Note     Obadiah Splinter Patient Status:  Inpatient    3/10/1965 MRN [id number]   [facility] 4NW-A Attending Nagi Jones MD   Hosp Day # 4 PCP Radha Stiles DO     Chief Complaint: abd pain    S: Patient --        Recent Labs   Lab  11/20/17   0609   11/21/17   0632  11/21/17   1644  11/22/17   0552  11/22/17   1131   GLU  179*   < >  361*  315*  158*   --    BUN  23*   < >  27*  35*  32*   --    CREATSERUM  0.85   < >  0.96  1.29*  0.90   --    CA  7.8* enema; on insulin;follow rpt bmp  7. Hyponatremia-worse again but mostly due to hyperglycemia  8. Leg weakness with inability to satnd and walk due to this per pt- neuro consult  9.  Anxiety, depression- multiple med problems could be  Causing anxiety and d

## 2017-11-22 NOTE — PAYOR COMM NOTE
--------------  CONTINUED STAY REVIEW    Payor: [employer] OPEN [facility]    11/21    OPERATIVE REPORT        PREOPERATIVE DIAGNOSIS:  Right hydroureteronephrosis, metastatic ovarian carcinoma, acute renal insufficiency.   POSTOPERATIVE DIAGNOSIS:  Right hydroureterone Hyperkalemia: K high.     # Thrombocytosis: likely reactive due to ca.        IV ROCPEHIN QD

## 2017-11-22 NOTE — PROGRESS NOTES
[facility] Neurology Note    Rogerio Layer Patient Status:  Inpatient    3/10/1965 MRN [id number]   [facility] 4NW-A Attending Idalia Stein MD   [facility] Day # 4 PCP Татьяна Garcia DO     REASON FOR CONSULTATION:    Lower ext incontinence 2015    New problem    • Type 2 diabetes mellitus (HCC)     Dx at age 29   • Visual impairment        PAST SURGICAL HISTORY:  Past Surgical History:  :  DELIVERY ONLY  10-2-14: COLONOSCOPY,DIAGNOSTIC      Comment: Dr. Bijan Sears Q2H PRN   Or      HYDROmorphone HCl PF (DILAUDID) 1 MG/ML injection 0.8 mg 0.8 mg Intravenous Q2H PRN   ondansetron HCl (ZOFRAN) injection 4 mg 4 mg Intravenous Q6H PRN       PHYSICAL EXAMINATION:  VITAL SIGNS: /58 (BP Location: Left arm)   Pulse 104 right hydronephrosis and proximal right hydroureter.     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

## 2017-11-22 NOTE — PHYSICAL THERAPY NOTE
PHYSICAL THERAPY EVALUATION - INPATIENT     Room Number: 403/403-A  Evaluation Date: 11/22/2017  Type of Evaluation: Initial  Physician Order: PT Eval and Treat    Presenting Problem: weakness  Reason for Therapy: Mobility Dysfunction and Discharge Johnny Erik  No date: TOTAL ABDOM HYSTERECTOMY  10-2-14: UPPER GI ENDOSCOPY,DIAGNOSIS      Comment: Dr. Sergey Pozo  Type of Home: House   Home Layout: Two level        Stairs to Bedroom: 12  Railing: Yes    Lives With: Spouse (daughter, son) does the patient currently need. ..   -   Moving to and from a bed to a chair (including a wheelchair)?: A Little   -   Need to walk in hospital room?: Total   -   Climbing 3-5 steps with a railing?: Total       AM-PAC Score:  Raw Score: 14   PT Approx Degr measures completed include Elderly mobility scale score which shows pt is currently dependent for mobility. Based on this evaluation, patient's clinical presentation is unstable and overall the evaluation complexity is considered high.   These impairments

## 2017-11-22 NOTE — PROGRESS NOTES
[facility]  Progress Note    Dago Dc Patient Status:  Inpatient    3/10/1965 MRN [id number]   [facility] 4NW-A Attending Kristian Lewis MD   [facility] Day # 4 PCP Fatou Manriquez DO     Subjective:  Dago Dc is a(n) 01 yea 126 11/22/2017   K 6.4 11/22/2017   CL 96 11/22/2017   CO2 22.0 11/22/2017    11/22/2017   CA 7.8 11/22/2017   INR 1.17 11/21/2017   PTP 15.0 11/21/2017   PGLU 307 11/22/2017       Imaging:  US  Abdominal paracentesis yesterday 4.5 liters    Current Chemotherapy-induced peripheral neuropathy (HCC)     Liver metastases (HCC)     Peritoneal metastases ([address]     Type 2 diabetes mellitus with both eyes affected by moderate nonproliferative retinopathy without macular edema, with long-term current use of i Thrombocytosis, await repeat CBC today, reactive. . Last CBC 11/19, check today    8. Neuropathy, chronic lyrica    Prophylactic lovenx    If the patient is discharged, She has appt scheduled with me on Friday morning.  She is planning on returning home, her

## 2017-11-22 NOTE — CM/SW NOTE
Care Coordination Rounds held 11/22/2017. Treatment team members present today include , , Charge Nurse, and nurse caring for [employer].      Other care providers present:    Patient Active Problem List:     Hypertension currently 6.4. Anticipated discharge date: pending clinical course    Current discharge plan: Home with family.     Corinne Bansal RN, [facility]/CM  [phone]

## 2017-11-22 NOTE — CONSULTS
[facility]  Report of Psychiatric Consultation    Dillon Palomino Patient Status:  Inpatient    3/10/1965 MRN [id number]   [facility] 4NW-A Attending Devante Rod MD   [facility] Day # 4 PCP Radha Gore DO     Date of Admission:  going. She has low energy/motivation/appetite, but still manages to work part time in home . She has been more anxious and depressed the past several weeks.  She has been offered antidepressants, but has declined in the past, preferring \"not to rely of Onset   • Diabetes Father    • headaches [OTHER] Mother      Allergies:  No Known Allergies    Medications:    Current Facility-Administered Medications:   •  dextrose 50% injection 50 mL, 50 mL, Intravenous, PRN  •  insulin detemir (LEVEMIR) 100 UNIT/M anxious  Affect: Congruent    Thought process: logical  Thought content: no delusions  Perceptions: no hallucinations  Associations: Intact    Orientation: Alert and oriented x 4  Attention and Concentration:   fair  Memory:  intact immediate, recent, anderson previous. Stable lobulated contour of the liver especially of the left lobe. Persistent narrowing of the left portal vein from mass effect. No portal vein thrombosis   identified. BILIARY:  No visible dilatation or calcification.     PANCREAS:  The pancrea hysterectomy. BONES:  Degenerative changes are seen in the thoracic and lumbar spine and within the bilateral hips. No bony metastatic disease identified. LUNG BASES:  No visible pulmonary or pleural disease.     OTHER:  Negative.       =====  CONCLUSION:

## 2017-11-22 NOTE — PROGRESS NOTES
[facility]  Endocrinology Progress Note    Gaurang Douglas Patient Status:  Inpatient    3/10/1965 MRN [id number]   [facility] [place] 4NW-A Attending Albino Martinez MD   [facility] Day # 4 PCP Dereje Franco DO     Subjective:  Feels slightly be 169* 238* 220* 195* 237* 348* 379* 463* 450* 376* 346*   Results for Chele Cardona (MRN [id number]) as of 11/22/2017 09:09   Ref.  Range 11/21/2017 17:59 11/21/2017 21:40 11/22/2017 08:11   POC GLUCOSE Latest Ref Range: 65 - 99 mg/dL 369 (H) 223 (H) 185

## 2017-11-22 NOTE — CONSULTS
[facility]  Report of Consultation    Tammy House Patient Status:  Inpatient    3/10/1965 MRN [id number]   [facility] 4NW-A Attending Ailyn Donahue MD   [facility] Day # 4 PCP Araceli Parson DO     Reason for Consultation:  Hyperkale used smokeless tobacco. She reports that she does not drink alcohol or use drugs.     Allergies:  No Known Allergies    Current Medications:    Current Facility-Administered Medications:   •  dextrose 50% injection 50 mL, 50 mL, Intravenous, PRN  •  insulin otherwise unremarkable. Physical Exam:  Vital signs: Blood pressure 103/65, pulse 106, temperature 98.3 °F (36.8 °C), temperature source Oral, resp.  rate 18, height 63\", weight 199 lb 1.2 oz, last menstrual period 09/20/2014, SpO2 98 %, not currently b for adrenal insufficiency  - gentle fluids + lasix  - monitor labs  - check random cortisol level - may need stim test    - encourage pt to take prescribed kayexalate  - insulin  4. DM  5. Anxiety  6.  Hyponatremia- hypervolemic ; suspect siadh - check urin

## 2017-11-22 NOTE — CM/SW NOTE
SW met w/pt regarding a notary. SW called all available notaries/left messages. Many were out of town for the holidays. Provided pt w/contact information for notaries. Informed her it may be something she would need to do as an outpatient.

## 2017-11-22 NOTE — OCCUPATIONAL THERAPY NOTE
OCCUPATIONAL THERAPY EVALUATION - INPATIENT     Room Number: 403/403-A  Evaluation Date: 11/22/2017  Type of Evaluation: Initial  Presenting Problem: UTI, abdominal pain, ascites, s/p parcentesis 11/22    Physician Order: IP Consult to Occupational Therapy ENDOSCOPY,DIAGNOSIS      Comment: Dr. Em Dow  Type of Home: House  Home Layout: Two level  Lives With: Spouse (daughter, son)    Toilet and Equipment: Standard height toilet  Shower/Tub and Equipment: Walk-in toilet, bedpan or urinal? : A Lot  -   Putting on and taking off regular upper body clothing?: A Little  -   Taking care of personal grooming such as brushing teeth?: None  -   Eating meals?: None    AM-PAC Score:  Score: 17  Approx Degree of Impairment: 5 level and would benefit from skilled inpatient OT to address the above deficits, maximizing patient’s ability to return safely to her prior level of function. Subacute rehab is recommended for 14-17 days.   After this period of rehabilitation patient jelly

## 2017-11-22 NOTE — CM/SW NOTE
Sacha out of network. Pt agreeable to be close to the hospital to get to her appointments. Pt agreeable to a referral to [facility]. SW sent referral via [address]. PT informed SW they are recommending [facility]. Still awaiting for them to enter their note.

## 2017-11-23 NOTE — PROGRESS NOTES
[facility]  Progress Note     Riccardo José Manuel Patient Status:  Inpatient    3/10/1965 MRN [id number]   [facility] 4NW-A Attending J Luis Boss MD   Hosp Day # 5 PCP Gomez Zapien DO     Chief Complaint: abd pain    S: Patient 11/21/17   1644  11/22/17   0552  11/22/17   1131  11/22/17 2005 11/23/17   0709   GLU  315*  158*   --    --   71   BUN  35*  32*   --    --   32*   CREATSERUM  1.29*  0.90   --    --   0.90   CA  8.0*  7.8*   --    --   7.4*   NA  125*  126*   --    - sine today  9. Anxiety, depression- multiple med problems could be  Causing anxiety and depression- will get psych consult also to help with this. Plan of care: follow BMP . Neurology consult for leg weakness . D/w PT who recommends [facility], [facility] consult.

## 2017-11-23 NOTE — PROGRESS NOTES
[facility]  Progress Note    Galilea Hare Patient Status:  Inpatient    3/10/1965 MRN [id number]   [facility] 4NW-A Attending Sussy Aquino MD   [facility] Day # 5 PCP Jennifer Jessica DO     Subjective:  Galilea Hare is a(n) 60 yea 2017       Imagin.5 liters    Current Facility-Administered Medications:  furosemide (LASIX) injection 40 mg 40 mg Intravenous 2 times per day   Sodium Polystyrene Sulfonate (KAYEXALATE) 15 GM/60ML suspension 30 g 30 g Oral Once   dextrose 50% chemotherapy [facility])     Diabetic peripheral neuropathy (HCC)     Ovarian cancer, unspecified laterality ([facility]     Chronic pain syndrome     Chemotherapy-induced peripheral neuropathy (HCC)     Liver metastases ([facility]     Peritoneal metastases (HCC)     Type issue that is preventing discharge. Management per PCP, nephrology, and endocrinology    6. Weakness/falls, multifactorial, PT working with her, will order a walker. I suspect that the hyperkalemia is contributing, as well. MRI has been ordered.   Will in

## 2017-11-23 NOTE — PROGRESS NOTES
Patient awake and alert this morning. No complaints of pain. Patient had to be lowered to the ground during transfer because patient \"could not stand it\". Rl6 documented. PT consulted. Patient potassium 6.4.  Kayexelate enema, insulin, D50, and nebulizer

## 2017-11-23 NOTE — PROGRESS NOTES
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

## 2017-11-23 NOTE — PROGRESS NOTES
[facility]  Progress Note    Mariia Oreilly Patient Status:  Inpatient    3/10/1965 MRN [id number]   [facility] 4NW-A Attending Lupis Hare MD   [address] Day # 5 PCP Esther Martinez DO     No acute issues overnight  Noted to be hypot temperature 98.5 °F (36.9 °C), temperature source Oral, resp. rate 18, height 63\", weight 199 lb, last menstrual period 09/20/2014, SpO2 92 %, not currently breastfeeding. General: No acute distress. Alert and oriented x 3.   HEENT: Moist mucous membranes daily  4. Hypotension - start midodrine 5 tid; continue fluids  5. DM  6. Anxiety  7. Hyponatremia- hypervolemic ; urine osm inappropriately high c/w siadh - monitor; continue lasix      Thank you for allowing me to participate in this patient's care.   Ple

## 2017-11-23 NOTE — PROGRESS NOTES
[facility]  Endocrinology Progress Note    Tadmartin Henderson Patient Status:  Inpatient    3/10/1965 MRN [id number]   [facility] 4NW-A Attending Anabelle Castaneda MD   [facility] Day # 5 PCP Christiano Bradford DO     CC: Patient presents with:  Ab 80    A/P  47year old woman with DM2, metastatic ovarian cancer s/p paracentesis for ascites  1.  DM2  - uncontrolled with A1c 11.2  - long term insulin use  - complicated by retinopathy and neuropathy  - continue levemir 18 q12hr  - continue novolog 1 un

## 2017-11-23 NOTE — PROGRESS NOTES
Alert and oriented x 4, v.s.s, critical K+ level of 6.1 called in to renal M.D.  Lasix IV and kayexelate enema given as ordered, pt without c/o pain or signs of distress, MRI of thoracic and lumbar spine still pending, pt up to bedside commode with assist,

## 2017-11-24 NOTE — PLAN OF CARE
GENITOURINARY - ADULT    • Absence of urinary retention Not Progressing          CARDIOVASCULAR - ADULT    • Maintains optimal cardiac output and hemodynamic stability Progressing        Impaired Activities of Daily Living    • Achieve highest/safest level

## 2017-11-24 NOTE — PLAN OF CARE
Maintains optimal cardiac output and hemodynamic stability Progressing     Pt on tele, showing ST in 100s. Asymptomatic. K 4.3 this AM, improved.      Verbalizes/displays adequate comfort level or patient's stated pain goal Progressing     Pt c/o back pain

## 2017-11-24 NOTE — PLAN OF CARE
CARDIOVASCULAR - ADULT    • Maintains optimal cardiac output and hemodynamic stability Progressing        GENITOURINARY - ADULT    • Absence of urinary retention Progressing        Impaired Activities of Daily Living    • Achieve highest/safest level of in

## 2017-11-24 NOTE — PAYOR COMM NOTE
--------------  CONTINUED STAY REVIEW    Payor: JOO      11/24        Renuka Brooks is a(n) 47year old female. She is followed by Dr. Byron Olivares for metastatic ovarian cancer.   She was diagnosed in 2014, she has had a variety of therapy including surger is accepted for clinical trial to start Monday 11/27 at [facility].       3. UTI, ceftriaxone     4. JUANA/right hydronephrosis, renal function greatly improved     5. Hyperglycemia/hyperkalemia: Improved.   Management per Nephrology and enmdocrino

## 2017-11-24 NOTE — PROGRESS NOTES
[facility]  Endocrinology Progress Note    Rhae Keys Patient Status:  Inpatient    3/10/1965 MRN [id number]   [facility] 4NW-A Attending Christina Gtz MD   [facility] Day # 6 PCP Amanda Paula DO     CC: Patient presents with:  Ab PTT 31.4 11/24/2017   INR 1.15 11/24/2017   PTP 14.8 11/24/2017   PGLU 104 11/24/2017     FS  11/22: 8a 185 (18L,28A), 12p 310 (26A), 6p 348 (36A), 10p 234 (18L,12A)  11/23: 8a 101 (18L), 3p 131, 5p 123, 10p 356 (18L,27A)  11/24: 8a 100 (18L,2A), 12p 104

## 2017-11-24 NOTE — PROGRESS NOTES
[facility]  Progress Note    Gaurang Douglas Patient Status:  Inpatient    3/10/1965 MRN [id number]   [facility] 4NW-A Attending Albino Martinez MD   [facility] Day # 6 PCP Dereje Franco DO     No acute issues overnight      Current Facil temperature source Oral, resp. rate 20, height 63\", weight 204 lb 3.2 oz, last menstrual period 09/20/2014, SpO2 95 %, not currently breastfeeding. General: No acute distress. Alert and oriented x 3. HEENT: Moist mucous membranes. EOM-I.  PERRL  Neck: No florinef  4. Hypotension - continue midodrine 5 tid  5. DM  6. Anxiety  7. Hyponatremia- hypervolemic ; urine osm inappropriately high c/w siadh - monitor; continue lasix      Thank you for allowing me to participate in this patient's care.   Please feel fr

## 2017-11-24 NOTE — PHYSICAL THERAPY NOTE
PHYSICAL THERAPY TREATMENT NOTE - INPATIENT    Room Number: 403/403-A     Session: 1   Number of Visits to Meet Established Goals: 5    Presenting Problem: weakness    Problem List  Principal Problem:    Abdominal pain, acute  Active Problems:    Liver me -           Static Standing: Poor +  Dynamic Standing: Poor +    ACTIVITY TOLERANCE  Blood Pressure: Sitting 76/50, 78/52, 73/54 and 94/67 in supine after seated and standing activities    AM-PAC '6-Clicks' INPATIENT SHORT FORM - BASIC MOBILITY  How much d squeezes  LAQ     Upper Extremity      Position Sitting     Repetitions   15-20   Sets   1     Patient End of Session: In bed;Needs met;Call light within reach;RN aware of session/findings; All patient questions and concerns addressed    ASSESSMENT   Pt see Comments: Goals established on 11/22/2017  Progressing toward all

## 2017-11-24 NOTE — CM/SW NOTE
CM spoke w/[facility] Juan Antonio who stated they most likely will not get auth until Monday. Updated RN.

## 2017-11-24 NOTE — OCCUPATIONAL THERAPY NOTE
OCCUPATIONAL THERAPY TREATMENT NOTE - INPATIENT     Room Number: 403/403-A  Session: 1   Number of Visits to Meet Established Goals: 5    Presenting Problem: UTI, abdominal pain, ascites, s/p parcentesis 11/22    History related to current admission: Pt ad goal is to walk    OBJECTIVE       WEIGHT BEARING RESTRICTION                   PAIN ASSESSMENT  Ratin  Location: none reported        ACTIVITY TOLERANCE  Room air  BP 84/53 supine, 76/50setaed, 94/67 supine  ACTIVITIES OF DAILY LIVING ASSESSMENT  AM-P lower body ADL. Recommend skilled OT in the [facility] hospital setting to increase independence with ADL/functional transfers. Subacute rehab is recommended for 14-17 days.   After this period of rehabilitation patient should achieve supervision-mod (I) level

## 2017-11-24 NOTE — PROGRESS NOTES
[facility]  Progress Note    Dago Dc Patient Status:  Inpatient    3/10/1965 MRN [id number]   [facility] 4NW-A Attending Kristian Lewis MD   [address] [facility] [address] Day # 6 PCP Fatou Manriquez DO     Subjective:  Dago Dc is a(n) 62 yea 128 11/24/2017   K 4.3 11/24/2017   CL 97 11/24/2017   CO2 19.0 11/24/2017    11/24/2017   CA 7.1 11/24/2017   PTT 31.4 11/24/2017   INR 1.15 11/24/2017   PTP 14.8 11/24/2017   PGLU 100 11/24/2017       Imaging:   U/S Abdomen: FINDINGS:    Limited g PRN   ondansetron HCl (ZOFRAN) injection 4 mg 4 mg Intravenous Q6H PRN       Assessment and Plan:  Discussed with attending physician  Patient Active Problem List:     Hypertension     Morbid obesity ([address]     Status post total abdominal hysterectomy and b cancer, the patient is accepted for clinical trial to start Monday 11/27 at [address] [place] [address]. She is motivated to maintain this appointment. 3. UTI, ceftriaxone    4. JUNAA/right hydronephrosis, renal function greatly improved    5.  Hyperglycemia/hy

## 2017-11-25 NOTE — PROGRESS NOTES
[facility]  Progress Note    Duane Clemens Patient Status:  Inpatient    3/10/1965 MRN [id number]   [facility] 4NW-A Attending Jesus Jesus MD   [facility] Day # 7 PCP Jade Houston, DO     No acute issues overnight  Feels weak    Cur oz, last menstrual period 09/20/2014, SpO2 94 %, not currently breastfeeding. General: No acute distress. Alert and oriented x 3. HEENT: Moist mucous membranes. EOM-I. PERRL  Neck: No lymphadenopathy. No JVD. No carotid bruits.   Respiratory: Clear to au

## 2017-11-25 NOTE — PROGRESS NOTES
[facility]  Endocrinology Progress Note    Renuka Brooks Patient Status:  Inpatient    3/10/1965 MRN [id number]   [facility] 4NW-A Attending Sola Thompson MD   [facility] Day # 7 PCP Cassy Demarco DO     CC: Patient presents with:  Ab (18L,12A)  11/23: 8a 101 (18L), 3p 131, 5p 123, 10p 356 (18L,27A)  11/24: 8a 100 (18L,2A), 12p 104 (4A), 5p 236 (15A), 10p 286 (18L,18A)  11/25: 7a 80    A/P  47year old woman with DM2, metastatic ovarian cancer s/p paracentesis for ascites  1.  DM2  - un

## 2017-11-25 NOTE — PLAN OF CARE
Impaired Functional Mobility    • Achieve highest/safest level of mobility/gait Not Progressing        PAIN - ADULT    • Verbalizes/displays adequate comfort level or patient's stated pain goal Not Progressing          Impaired Functional Mobility    • Ach

## 2017-11-25 NOTE — PROGRESS NOTES
[facility]  Progress Note     Мария Parents Patient Status:  Inpatient    3/10/1965 MRN [id number]   [facility] 4NW-A Attending Cholo Bearden MD   [facility] Day # 6 PCP Mike Latif DO     Chief Complaint: abd pain    S: Patient 7. 3*  7.1*   NA  127*  127*  128*   K  6.0*  5.5*  4.3   CL  96*  96*  97*   CO2  22.0  22.0  19.0*       Estimated Creatinine Clearance: 60.5 mL/min (based on SCr of 0.91 mg/dL).     Recent Labs   Lab  11/24/17   0738   PTP  14.8*   INR  1.15*       No res weakness     TECHNIQUE:  Multiplanar T1 and T2 weighted images including fat suppression sequences. Images acquired in sagittal and axial planes.        FINDINGS:    PARASPINAL AREA:  Right hydronephrosis and right sided pelvic mass  BONES:  No evidence of • Sodium Polystyrene Sulfonate  15 g Rectal Once   • cefTRIAXone  1 g Intravenous Q24H   • enoxaparin  40 mg Subcutaneous Daily       ASSESSMENT / PLAN:     1.  Tense malignant ascites s/p diagnostic and therapeutic paracentesis-s/p tap  for re-accumulati

## 2017-11-25 NOTE — PROGRESS NOTES
[facility]  Progress Note    Domingo Lai Patient Status:  Inpatient    3/10/1965 MRN [id number]   [facility] 4NW-A Attending Smitha Allan MD   [address] [facility] [address] [facility] [address] Day # 7 PCP Yumiko Cope DO     Subjective:  Domingo Lai is a(n 47 yea PGLU 137 11/25/2017       Imaging:   U/S Abdomen: FINDINGS:    Limited gray scale imaging of all 4 quadrants demonstrates trace ascites.  It was determined that a paracentesis would not be performed at this time.        =====  CONCLUSION:  Cancellation of Morbid obesity ([facility]     Status post total abdominal hysterectomy and bilateral salpingo-oophorectomy ([facility])     Stress incontinence     Morbid obesity with BMI of 40.0-44.9, adult ([facility]     Peripheral neuropathy due to chemotherapy [facility])     Diabetic appointment. 3. UTI, ceftriaxone    4. JUANA/right hydronephrosis, renal function greatly improved    5. Hyperglycemia/hyperkalemia: Improved. Management per Nephrology and enmdocrinology.     6. Weakness/falls, multifactorial, PT working with her, will o

## 2017-11-25 NOTE — PROGRESS NOTES
[facility]  Progress Note     Suzanne Findgely Patient Status:  Inpatient    3/10/1965 MRN [id number]   [facility] 4NW-A Attending Britany Kraus MD   [facility] Day # 7 PCP Raford Cogan, DO     Chief Complaint: abd pain    S: Patient 32*  32*  33*   CREATSERUM  0.93  0.91  0.82   CA  7.3*  7.1*  7.2*   NA  127*  128*  126*   K  5.5*  4.3  3.7   CL  96*  97*  95*   CO2  22.0  19.0*  21.0*       Estimated Creatinine Clearance: 67.1 mL/min (based on SCr of 0.82 mg/dL).     Recent Labs   La 11/18/2017, 19:04. INDICATIONS:  lower extremity weakness     TECHNIQUE:  Multiplanar T1 and T2 weighted images including fat suppression sequences. Images acquired in sagittal and axial planes.        FINDINGS:    PARASPINAL AREA:  Right hydronephrosi lidocaine-prilocaine   Topical BID   • pregabalin  300 mg Oral BID   • Sodium Polystyrene Sulfonate  15 g Rectal Once   • cefTRIAXone  1 g Intravenous Q24H   • enoxaparin  40 mg Subcutaneous Daily       ASSESSMENT / PLAN:     1.  Tense malignant ascites s/p

## 2017-11-26 NOTE — PROGRESS NOTES
[facility]  Endocrinology Progress Note    Tadmartin Henderson Patient Status:  Inpatient    3/10/1965 MRN [id number]   [facility] 4NW-A Attending Anabelle Castaneda MD   [facility] Day # 8 PCP Chirstiano Bradford DO     CC: Patient presents with:  Ab (18L,12A)  11/23: 8a 101 (18L), 3p 131, 5p 123, 10p 356 (18L,27A)  11/24: 8a 100 (18L,2A), 12p 104 (4A), 5p 236 (15A), 10p 286 (18L,18A)  11/25: 7a 137 (18L,5A), 12p 156 (4A), 6p 198 (11A), 8p 196 (18L,7A)  11/26: 8a 80   A/P  47year old woman with DM2,

## 2017-11-26 NOTE — PLAN OF CARE
Assumed care of Pt at 2300. Pt resting in bed comfortably at this time. Pt with no acute distress overnight. No c/o nausea. Denies pain. Breathing without difficulty. Call light within reach. Will continue to monitor.

## 2017-11-26 NOTE — PROGRESS NOTES
[facility]  Progress Note    Agustina Valenzuela Patient Status:  Inpatient    3/10/1965 MRN [id number]   [facility] 4NW-A Attending Maral Chauhan MD   [facility] Day # 8 PCP Virgil Kay, DO     Sitting up in chair  Feels better overall; s Oral, resp. rate 16, height 63\", weight 206 lb 6.4 oz, last menstrual period 09/20/2014, SpO2 93 %, not currently breastfeeding. General: No acute distress. Alert and oriented x 3. HEENT: Moist mucous membranes. EOM-I. PERRL  Neck: No lymphadenopathy. MD  11/26/2017

## 2017-11-26 NOTE — PROGRESS NOTES
[facility]  Progress Note     Samir Williamson Patient Status:  Inpatient    3/10/1965 MRN [id number]   [facility] 4NW-A Attending Vidal Liu MD   [facility] Day # 8 PCP Chon Cifuentes DO     Chief Complaint: abd pain    S: Patient 7. 2*  7.2*   NA  128*  126*  129*   K  4.3  3.7  4.2   CL  97*  95*  96*   CO2  19.0*  21.0*  22.0       Estimated Creatinine Clearance: 67.1 mL/min (based on SCr of 0.82 mg/dL).     Recent Labs   Lab  11/24/17   0738   PTP  14.8*   INR  1.15*       No resu weakness     TECHNIQUE:  Multiplanar T1 and T2 weighted images including fat suppression sequences. Images acquired in sagittal and axial planes.        FINDINGS:    PARASPINAL AREA:  Right hydronephrosis and right sided pelvic mass  BONES:  No evidence of • Sodium Polystyrene Sulfonate  15 g Rectal Once   • cefTRIAXone  1 g Intravenous Q24H   • enoxaparin  40 mg Subcutaneous Daily       ASSESSMENT / PLAN:     1.  Tense malignant ascites s/p diagnostic and therapeutic paracentesis-s/p tap  for re-accumulati

## 2017-11-27 NOTE — PLAN OF CARE
CARDIOVASCULAR - ADULT    • Maintains optimal cardiac output and hemodynamic stability Progressing        GENITOURINARY - ADULT    • Absence of urinary retention Progressing        PAIN - ADULT    • Verbalizes/displays adequate comfort level or patient's s

## 2017-11-27 NOTE — PROGRESS NOTES
[facility]  Endocrinology Progress Note    Dorrine Half Patient Status:  Inpatient    3/10/1965 MRN [id number]   [facility] 4NW-A Attending Modesta Kang MD   [facility] Day # 9 PCP Ant Almaraz DO     CC: Patient presents with:  Ab 11/27/2017   PGLU 120 11/27/2017     FS  11/22: 8a 185 (18L,28A), 12p 310 (26A), 6p 348 (36A), 10p 234 (18L,12A)  11/23: 8a 101 (18L), 3p 131, 5p 123, 10p 356 (18L,27A)  11/24: 8a 100 (18L,2A), 12p 104 (4A), 5p 236 (15A), 10p 286 (18L,18A)  11/25: 7a 137 (

## 2017-11-27 NOTE — PROGRESS NOTES
Received call from lab that BG was 52. PCT checked BG, 54. Followed hypoglycemic protocol.  Given Dex4 15g carb liquid with orange juice per pt request.

## 2017-11-27 NOTE — CM/SW NOTE
SW spoke w/PT regarding pt. Informed them SW will need updated PT notes to obtain insurance auth. Awaiting PT notes. Per Rn, pt is requesting a notary. SW left several message for all available notary staff. Awaiting a call back.

## 2017-11-27 NOTE — PROGRESS NOTES
[facility]  Progress Note     Dagoconchis Dc Patient Status:  Inpatient    3/10/1965 MRN [id number]   [facility] 4NW-A Attending Huma Lou MD   [address] Day # 9 PCP Fatou Manriquez DO     Chief Complaint: abd pain    S: Patient Creatinine Clearance: 74.4 mL/min (based on SCr of 0.74 mg/dL). No results for input(s): PTP, INR in the last 72 hours. No results for input(s): TROP, CK in the last 72 hours.          Imaging:   MRI OF THE THORACIC SPINE WITHOUT CONTRAST     COMPARIS and axial planes. FINDINGS:    PARASPINAL AREA:  Right hydronephrosis and right sided pelvic mass  BONES:  No evidence of lumbar spine compression fracture or acute marrow edema. .  CORD/CAUDA EQUINA:  Normal caliber, contour, and signal intensity. ASSESSMENT / PLAN:     1. Tense malignant ascites s/p diagnostic and therapeutic paracentesis-s/p tap  for re-accumulating ascites  2. JUANA-possibly due to malignant ureteral obstruction-resolved s/p right ureteral stent placement  3.  Metastatic ovari Ms. Sierra Renteria is expected to be discharge to: PT recommends KELSEY    Informed by RN that oncology Dr Leigh Bryant plans to  start chemo tomorrow as [facility] could not start clinical trial today as planned as pt did not qualify due to her deconditioning and poor performan

## 2017-11-27 NOTE — PROGRESS NOTES
[facility]  Progress Note    Ni Zhou Patient Status:  Inpatient    3/10/1965 MRN [id number]   [facility] 4NW-A Attending Jose Arboleda MD   [facility] Day # 9 PCP Alejandra Duckworth DO       SUBJECTIVE:    Weak, abd not too distended edema. Neurological: Grossly intact. RADIOLOGY:     ASSESSMENT/PLAN:    # Malignant Ascites: Re-accumulating. US Fri not enough to tap. Follow closely. Has refused Pleurx.     # Metastatic Ovarian ca: appt at [facility] today to start chemo per clinical tr

## 2017-11-27 NOTE — PLAN OF CARE
Patient has had no epsiodes of nausea during shift, has had prn doses of pain med for back pain, appetite has improved since yesterday

## 2017-11-27 NOTE — PROGRESS NOTES
Pt AOx4. VSS. Still c/o weakness. Needs assistance to get to chair and back to bed. BG 54 this AM, followed protocol. Endocrinology aware, changed nighttime levemir dose. Appetite moderately decreased. No c/o N/V today.  C/o pain in back--given prn dilaudid

## 2017-11-27 NOTE — PROGRESS NOTES
[facility]  Progress Note    Beverlee Half Patient Status:  Inpatient    3/10/1965 MRN [id number]   [facility] 4NW-A Attending Isamar Santiago MD   [address] Day # 9 PCP Shobha Titus, DO     Feels better today  Denies cp/sob  No n/v 208 lb, last menstrual period 09/20/2014, SpO2 93 %, not currently breastfeeding. General: No acute distress. Alert and oriented x 3. HEENT: Moist mucous membranes. EOM-I. PERRL  Neck: No lymphadenopathy. No JVD. No carotid bruits.   Respiratory: Clear t

## 2017-11-27 NOTE — PHYSICAL THERAPY NOTE
PHYSICAL THERAPY TREATMENT NOTE - INPATIENT    Room Number: 403/403-A     Session: 2   Number of Visits to Meet Established Goals: 5    Presenting Problem: weakness  History related to current admission: Pt was admitted from home on 11/18/2017 with abdomi ABDOM HYSTERECTOMY  10-2-14: UPPER GI ENDOSCOPY,DIAGNOSIS      Comment: Dr. Felipe Flakes  \"My butt hurts from sitting to much\"  \"I don't have my bearing in standing\"    Patient’s self-stated goal is to gain strength and walk    OBJECTIVE noted below. Sit to stand x 3 trials with rest break between each, assist from CGA to min assist up to r/w. On first trial pt takes shuffle steps in place, refuses gait away from eob due to fear of falling.  Pt increases standing time at 3rd trial up to mendez training;Balance training  Rehab Potential : Fair  Frequency (Obs): 5x/week    CURRENT GOALS   Goal #1 Patient is able to demonstrate supine - sit EOB @ level: modified independent   Goal #2 Patient is able to demonstrate transfers Sit to/from [facility]

## 2017-11-28 NOTE — CM/SW NOTE
SW spoke w/Katie from [facility]. Shoshana Douglas stated they still do not have insurance auth. Shoshana Douglas stated she would inform SW as soon as Kaleb Allen is obtained.

## 2017-11-28 NOTE — CM/SW NOTE
Medicar placed on will call [phone]  RN to call report if pt is discharged [facility]   RN aware of this

## 2017-11-28 NOTE — PROGRESS NOTES
[facility]  Endocrinology Progress Note    Dilia Obando Patient Status:  Inpatient    3/10/1965 MRN [id number]   [facility] 4NW-A Attending Obinna Rocha MD   [facility] Day # 8 PCP Cas Elaine DO     CC: Patient presents with:  A Date   INR 1.09 11/28/2017   PTP 14.1 11/28/2017   PGLU 92 11/28/2017     FS  11/22: 8a 185 (18L,28A), 12p 310 (26A), 6p 348 (36A), 10p 234 (18L,12A)  11/23: 8a 101 (18L), 3p 131, 5p 123, 10p 356 (18L,27A)  11/24: 8a 100 (18L,2A), 12p 104 (4A), 5p 236 (15A

## 2017-11-28 NOTE — PROGRESS NOTES
[facility]  Progress Note    Dorrine Half Patient Status:  Inpatient    3/10/1965 MRN [id number]   [facility] 4NW-A Attending Modesta Kang MD   [facility] Day # 10 PCP Ant Almaraz, DO     Feels weak  Plan for paracentesis today  Sagar Coffman [facility] injection 4 mg 4 mg Intravenous Q6H PRN       Physical Exam:  Vital signs: Blood pressure 112/69, pulse 95, temperature 97.6 °F (36.4 °C), temperature source Oral, resp.  rate 18, height 63\", weight 209 lb 9.6 oz, last menstrual period 09/20/2014, midodrine 5 tid - improved  5. DM  6. Anxiety  7.  Hyponatremia- hypervolemic ; urine osm inappropriately high c/w siadh - monitor; continue lasix - increased to bid    OK w/ d/c planning from renal standpoint    Thank you for allowing me to participate in

## 2017-11-28 NOTE — PROGRESS NOTES
[facility]  Progress Note    Suzanne Humphreys Patient Status:  Inpatient    3/10/1965 MRN [id number]   [facility] 4NW-A Attending Maribell Schroeder MD   [facility] Day # 10 PCP Raford Cogan, DO       SUBJECTIVE:    Very weak, abd distended. Regular rate and rhythm. Abdomen: Distended. Extremities: No edema. Neurological: Grossly intact. RADIOLOGY:     ASSESSMENT/PLAN:    # Malignant Ascites: Re-accumulating. Attempt tap again.     # Metastatic Ovarian ca: unfortunately, [facility] did not

## 2017-11-28 NOTE — PLAN OF CARE
CARDIOVASCULAR - ADULT    • Maintains optimal cardiac output and hemodynamic stability Progressing        Stable on monitor. Denies CP. Pt's Bp in the 90/50s at lowest.     GENITOURINARY - ADULT    • Absence of urinary retention Progressing        Pt.  Void

## 2017-11-28 NOTE — PROGRESS NOTES
[facility]  Progress Note     Dago Dc Patient Status:  Inpatient    3/10/1965 MRN [id number]   [facility] 4NW-A Attending Huma Lou MD   Hosp Day # 10 PCP Fatou Manriquez DO     Chief Complaint: abd pain    S: Darío Bundy Estimated Creatinine Clearance: 74.4 mL/min (based on SCr of 0.74 mg/dL). No results for input(s): PTP, INR in the last 72 hours. No results for input(s): TROP, CK in the last 72 hours.          Imaging:   MRI OF THE THORACIC SPINE WITHOUT CONTR acquired in sagittal and axial planes. FINDINGS:    PARASPINAL AREA:  Right hydronephrosis and right sided pelvic mass  BONES:  No evidence of lumbar spine compression fracture or acute marrow edema. .  CORD/CAUDA EQUINA:  Normal caliber, contour, and Polystyrene Sulfonate  15 g Rectal Once   • cefTRIAXone  1 g Intravenous Q24H   • enoxaparin  40 mg Subcutaneous Daily       ASSESSMENT / PLAN:     1.  Tense malignant ascites s/p diagnostic and therapeutic paracentesis-s/p tap  for re-accumulating ascites; remains stable after both of this and if okay with all the consultants possible discharge to subacute rehab later today or else we will plan for tomorrow  If patient drowsy or vital signs unstable.     Quality:  · DVT Prophylaxis: scds-lovenox   · CODE stat

## 2017-11-28 NOTE — PAYOR COMM NOTE
--------------  CONTINUED STAY REVIEW    Payor: Gilda Armando  #:  [id number]  Authorization Number: [employer] date: 11/18/17  Admit time: 2152    Admitting Physician: Adeline Watson MD  Attending Physician:  Cinthia Laws MD  Pr insulin detemir (LEVEMIR) 100 UNIT/ML flextouch 18 Units     Date Action Dose Route User    11/28/2017 0831 Given 18 Units Subcutaneous (Left Upper Arm) Alvina Yuen RN      insulin detemir (LEVEMIR) 100 UNIT/ML flextouch 14 Units     Date Action lasix  11/26  DM2  - uncontrolled with A1c 11.2  - long term insulin use  - complicated by retinopathy and neuropathy  - continue levemir 18 q12hr  - continue novolog 1 unit for every 8g carbs plus correction 1:10>140 QID prn  - will continue to monitor an

## 2017-11-28 NOTE — IMAGING NOTE
Ultrasound guided paracentesis performed with 5F single puncture set up. Local anesthesia 1% lidocaine. Position of the inferior epigastric artery determined-stayed clear. Left lower quadrant puncture site. No contraindications.    Patient tolerated

## 2017-11-28 NOTE — IMAGING NOTE
Pt into room US 4 for a US guided paracentesis. Pt had 2.4 l removed per Dr. Malinda Jerez. Pt tolerated well.   Report to floor

## 2017-11-29 NOTE — DISCHARGE SUMMARY
[facility]  Discharge Summary    Haritha Acuna Patient Status:  Inpatient    3/10/1965 MRN [id number]   [facility] 4NW-A Attending Brooke Knowles MD   [facility] Day # 10 PCP Adelaide Herrera DO     Date of Admission: 2017    Date o After discharge she started to have abd bloating and distension. This has been worsening since her discharge. +N/V. Non-billious, non-bloody. No fevers, no chills. abd pain has been increasing.     Please refer to history and physical done by Dr. Susan Islas change. BOWEL/MESENTERY:  There is a large amount of abdominal and pelvic ascites. No vessels are identified. There is omental carcinomatosis especially in the left upper quadrant which was present previously and is stable.  There is no free intraperitonea from admission grew E. coli sensitive to all antibiotics. Patient was treated with IV Rocephin for 10 days while in the hospital.    Patient had mild acute kidney injury on admission due to the ureteral obstruction.   Also had hyponatremia and hyperkalemia which showed  CONCLUSION:  Diffuse disc bulge with superimposed central disc protrusion L5-S1. There is narrowing of the thecal sac to approximately 6 mm. No significant neural foraminal stenosis.      Mild diffuse disc bulge at L4-5 with mild right neural metastatic ovarian carcinoma, acute renal insufficiency on 11/19/2017.       Incidental or significant findings and recommendations (brief descriptions):  • none    Lab/Test results pending at Discharge:   · none      Discharge Medications:        Discharge daily   Quantity:  550 each  Refills:  3     HUMALOG KWIKPEN 100 UNIT/ML Sopn  Generic drug:  Insulin Lispro      Take per carb ratio and correction - max 30 Units per day   Refills:  0     Insulin Syringe-Needle U-100 31G X 5/16\" 1 ML Misc  Commonly know [address] [facility]  [phone]    In 1 week  recheck bmp, labs, potassium    MARIBEL Puga  [address] [facility]  [phone]    Go on 11/27/2017  chemo labs APN assessment 1pm     Ramses Dennis

## 2017-11-30 PROBLEM — C80.0 CARCINOMATOSIS (HCC): Status: ACTIVE | Noted: 2017-01-01

## 2017-11-30 PROBLEM — E63.9 VERY POOR NUTRITION: Status: ACTIVE | Noted: 2017-01-01

## 2017-11-30 NOTE — ED PROVIDER NOTES
Patient Seen in: [facility] Emergency Department    History   Patient presents with:  Abnormal Result (metabolic, cardiac)  Altered Mental Status (neurologic)    Stated Complaint: abnormal labs, lethargy    HPI    50-year-old woman who has advanced noted above.     Physical Exam   ED Triage Vitals [11/30/17 1439]  BP: 94/47  Pulse: 102  Resp: 14  Temp: 97.6 °F (36.4 °C)  Temp src: Temporal  SpO2: 100 %  O2 Device: Nasal cannula    Current:BP 95/53 (BP Location: Right arm)   Pulse 94   Temp 98.1 °F (36 All other components within normal limits   TSH W REFLEX TO FREE T4 - Normal   AMMONIA, PLASMA - Normal   CBC WITH DIFFERENTIAL WITH PLATELET    Narrative: The following orders were created for panel order CBC WITH DIFFERENTIAL WITH PLATELET.   Proce

## 2017-11-30 NOTE — ED NOTES
Patient's mother has arrived and reports that she was told patient was being transported for abnormal potassium and abnormal liver enzymes. Patient is normally alert, oriented, and verbally responsive.  Per what mother was told from [facility], patient was

## 2017-11-30 NOTE — PROGRESS NOTES
Мария Avery  : 3/10/1965  Age 47year old  female patient is admitted to Facility: [place] [address] for KELSEY after tense malignant ascites.     [address] [place] [address] Drive date:    17  Discharge date to KELSEY:    17  ELOS:    14-17 days morning, and insomnia and poor appetite. Denies fever/chills, cough, pharyngitis, sinus pressure, n/v, worsening abd distention. Does note increased urinary frequency. Denies dysuria, lower abd pain, flank pain, hematuria.       Past Medical History:   D tablet by mouth every 4 (four) hours as needed. Disp: 20 tablet Rfl: 0   Fludrocortisone Acetate 0.1 MG Oral Tab Take 0.5 tablets (0.05 mg total) by mouth daily.  Disp: 20 tablet Rfl: 0   Midodrine HCl 5 MG Oral Tab Take 1 tablet (5 mg total) by mouth 3 (th pain or sore throat; and hearing loss negative; +rhinitis  RESPIRATORY: denies shortness of breath, wheezing or cough   CARDIOVASCULAR:denies chest pain, no palpitations , denies syncope, denies orthopnea, denies cough  GI: denies nausea, vomiting, constip sensorimotor deficit, cranial nerves intact II-XII, follows commands  PSYCHIATRIC: alert and oriented x 3; affect appropriate      DIAGNOSTICS REVIEWED AT THIS VISIT:    Lab Results  Component Value Date    (H) 11/30/2017   BUN 45 (H) 11/30/2017   C 2. ELOS 14-17 days  3. Plan DC on or before 12.15.17; SW to assist w/ DC plan    Rhinitis/congestion  1. Flonase 50 mcg/act; 2 sprays each nare BID x 5 days then decrease to 1 spray each nare BID  2. Claritin 10 mg daily  3.  Monitor for worsening sxs

## 2017-11-30 NOTE — H&P
[facility]  History and Physical     Kimberly Chip Patient Status:  Emergency    3/10/1965 MRN [id number]   Location [facility] Attending Brendan Almanzar MD   Hosp Day # 0 PCP Cher Hooper DO     Chief Complai neoplasm of liver [facility])    • Status post total abdominal hysterectomy and bilateral salpingo-oophorectomy (KATIE-BSO) 6/7/2015   • Stress incontinence 6/7/2015    New problem    • Type 2 diabetes mellitus (HCC)     Dx at age 29   • Visual impairment MG Oral Cap Take 1 capsule (300 mg total) by mouth 2 (two) times daily. Disp: 180 capsule Rfl: 1   Prochlorperazine Maleate (COMPAZINE) 10 mg tablet Take 1 tablet (10 mg total) by mouth every 6 (six) hours as needed for Nausea.  Disp: 60 tablet Rfl: 5   Ond 0. 95  0.82  0.88   CA  8.0*  7.8*  7.9*   ALB  1.4*   --   1.4*   NA  128*  131*  130*   K  5.9*  5.5*  5.8*   CL  97*  99*  98*   CO2  18.0*  22.0  22.0   ALKPHO  214*   --   220*   AST  73*   --   78*   ALT  9*   --   11*   BILT  0.2   --   0.3   TP  5.8

## 2017-11-30 NOTE — ED NOTES
Patient placed on bed pan. She reports she is unable to void at this time. Will endorse to the floor RN who will assume care of the patient.

## 2017-11-30 NOTE — ED INITIAL ASSESSMENT (HPI)
Patient presents for evaluation of lethargy and abnormal labs. [place] [facility] patient had potassium 5.9 yesterday, received kaexylate and had potassium 5.5 today.  They also report she is more lethargic, but could not tell EMS her baseline ment DISPLAY PLAN FREE TEXT

## 2017-12-01 PROBLEM — R53.1 WEAKNESS: Status: ACTIVE | Noted: 2017-01-01

## 2017-12-01 PROBLEM — E87.5 HYPERKALEMIA: Status: ACTIVE | Noted: 2017-01-01

## 2017-12-01 PROBLEM — R18.0 MALIGNANT ASCITES: Status: ACTIVE | Noted: 2017-01-01

## 2017-12-01 PROBLEM — R53.83 LETHARGY: Status: ACTIVE | Noted: 2017-01-01

## 2017-12-01 PROBLEM — Z71.89 GOALS OF CARE, COUNSELING/DISCUSSION: Status: ACTIVE | Noted: 2017-01-01

## 2017-12-01 PROBLEM — Z51.5 PALLIATIVE CARE ENCOUNTER: Status: ACTIVE | Noted: 2017-01-01

## 2017-12-01 PROBLEM — E87.1 HYPONATREMIA: Status: ACTIVE | Noted: 2017-01-01

## 2017-12-01 NOTE — PLAN OF CARE
Up sitting in the chair. Alert & oriented. Noted w/ generalized weakness. Maximum assistance   provided with transfer needs. Episodes of drowsiness but awakens & responds appropriately when spoken to. Able to verbalize needs to staff. Remains on p.o lasix. RUPALI landin

## 2017-12-01 NOTE — PROGRESS NOTES
Chacorta Islas  : 3/10/1965  Age 47year old  female      CC--Patient presents with:  Nursing Home: Admitted to [facility], ovarian cancer with malignant ascites      H. P.I Chacorta Islas is a 47year old female with history of metastatic Bicuspid aortic valve 8/21/2017   • History of ovarian cancer 6/7/2015   • Hyperlipidemia LDL goal <100    • Hypertension    • Liver mass    • Mass of omentum    • Moderate nonproliferative diabetic retinopathy of both eyes ([address]    • Neuropathy    • Nucle of anemia, on anticoagulants  ENDOCRINE: denies excessive thirst or urination; denies unexpected wt gain or wt loss ALLERGY/IMM.: denies food or seasonal allergies       VITALS:  /64   Pulse 100   Resp 22   LMP 09/20/2014   SpO2 98%   PHYSICAL EXAM: MD   [facility]  [address] [facility] [address] [address]    Electronically signed      11/30/17   6:06 PM

## 2017-12-01 NOTE — PLAN OF CARE
Neuro ordered EEG this morning. EEG initiated. Ultrasound sent for patient but currently having EEG. Ultrasound informed RN that procedure won't be done til this afternoon if not going for procedure now. Family at bedside. Informed of the plan. Dr Arabella Leslie to talk

## 2017-12-01 NOTE — OCCUPATIONAL THERAPY NOTE
OCCUPATIONAL THERAPY EVALUATION - INPATIENT     Room Number: 424/424-A  Evaluation Date: 12/1/2017  Type of Evaluation: Initial  Presenting Problem: lethargy    Physician Order: IP Consult to Occupational Therapy  Reason for Therapy: ADL/IADL Dysfunction a ENDOSCOPY,DIAGNOSIS      Comment: Dr. Adeline Perez  Type of Home: House  Home Layout: Two level  Lives With: Spouse;Daughter; Son    Toilet and Equipment: Standard height toilet  Shower/Tub and Equipment: Walk-in sh person does the patient currently need…  -   Putting on and taking off regular lower body clothing?: Total  -   Bathing (including washing, rinsing, drying)?: A Lot  -   Toileting, which includes using toilet, bedpan or urinal? : Total  -   Putting on and . In this OT evaluation patient presents with the following performance deficits: cognition, cardiopulmonary endurance, activity tolerance, balance, strength, coordination, functional reach, use of adaptive techniques.  These deficits impact the patient’s assist    Functional Transfer Goals  Patient will perform all functional transfers:  with min assist    UE Exercise Program Goal  Patient will be supervision with bilateral AROM HEP (home exercise program).     Additional Goals:  Pt will tolerate 2 min stat

## 2017-12-01 NOTE — CONSULTS
[facility]    Report of Consultation    Dillon Palomino Patient Status:  Inpatient    3/10/1965 MRN [id number]   [facility] 4NW-A Attending Hafsa Cherry MD   [address] Day # 1 PCP Monica Jaime DO     Date of Admission:  2017 drugs.     Allergies:  No Known Allergies    Medications:    Current Facility-Administered Medications:   •  0.9%  NaCl infusion, , Intravenous, Continuous  •  Enoxaparin Sodium (LOVENOX) 40 MG/0.4ML injection 40 mg, 40 mg, Subcutaneous, Daily  •  acetamino drowsy but awakens to voice. Knows she's in a hospital but not awake enough to answer more questions. Speech fluent. Not able to follow simple commands. Face is symmetrical.  No Drift. Pupils equally round and reactive to light.   2+ reflexes bilaterally Type 2 diabetes, uncontrolled, with cellulitis of foot ([address]     Benign essential HTN     Dyslipidemia     Malignant neoplasm of ovary (HCC)     Neuropathy     Cellulitis     Type 2 diabetes mellitus with complication, with long-term current use of insulin

## 2017-12-01 NOTE — CM/SW NOTE
12/01/17 1300   CM/SW Referral Data   Referral Source Social Work (self-referral)   Reason for Referral Discharge planning   Informant Patient   Readmission Assessment   Pt's level of independence at discharge?  Some assist (mod)   Was full assessment co

## 2017-12-01 NOTE — PROGRESS NOTES
[facility]  Progress Note     Syed Samuels Patient Status:  Inpatient    3/10/1965 MRN [id number]   [facility] 4NW-A Attending Cory Dominguez MD   Hosp Day # 1 PCP Bess Mcguire DO     Chief Complaint: AMS    S: Patient's MS of 0.76 mg/dL). Recent Labs   Lab  11/28/17   1039  12/01/17   0920   PTP  14.1  14.2   INR  1.09  1.10       No results for input(s): TROP, CK in the last 72 hours.       Lab Results  Component Value Date   TSH 2.950 11/30/2017       Imaging: Imaging da

## 2017-12-01 NOTE — CONSULTS
[facility]  Report of Consultation    Chacorta Islas Patient Status:  Inpatient    3/10/1965 MRN [id number]   [facility] 4NW-A Attending Yoandy Miller MD   [facility] Day # 1 PCP Mario Guerrero DO       Assessment / Plan:    1) Hyperkal malignant neoplasm of liver [facility])    • Status post total abdominal hysterectomy and bilateral salpingo-oophorectomy (KATIE-BSO) 6/7/2015   • Stress incontinence 6/7/2015    New problem    • Type 2 diabetes mellitus (HCC)     Dx at age 29   • Visual impairmen months to 65 years inj 0.5ml, 0.5 mL, Intramuscular, Prior to discharge  •  Fludrocortisone Acetate (FLORINEF) tab 0.05 mg, 0.05 mg, Oral, Daily  •  furosemide (LASIX) tab 20 mg, 20 mg, Oral, BID (Diuretic)  •  Midodrine HCl (PROAMATINE) tab 5 mg, 5 mg, Or 12/01/2017   TSH 2.950 11/30/2017   PGLU 181 12/01/2017       Imaging: All imaging studies reviewed. Thank you for allowing me to participate in the care of your patient.     Jluis Pedroza  12/1/2017  1:36 PM

## 2017-12-01 NOTE — CONSULTS
Louis  [id number]  Hospital Day #1  Date of Consult: 12/01/17       Reason for Consultation: Consult requested for evaluation of palliative care needs and goals of care discussion. GEN:  Reports pain to feet bilaterally; states Lyrica controls pain \"most of the time\". Physical Exam:  GEN: lying in bed; occasionally pulling on IV; confused  Palliative Performance Scale : 40%    Assessment:  Prognosis/Goals of Care:  Met with [facility] to family. Spouse states he plans on speaking with children this evening.       Advance Directive: Copy on chart  Type of Healthcare Directive: Durable power of  for health care  Healthcare Agent Appointed: No        Pre-existin

## 2017-12-01 NOTE — PLAN OF CARE
Patient back from [facility]. Paracentesis not done,not enough fluids to drain per ultrasound. Back to the room in stable condition. Noted coughing with thin liquids when medication given. Referred to MD w/ orders for speech eval,speech eval done w/ recommendat

## 2017-12-01 NOTE — PROCEDURES
[facility], [address] [place] [address]      PATIENT'S NAME: Alon Mariano   ATTENDING PHYSICIAN: Kiara Ledezma M.D.    PATIENT ACCOUNT #: [de-identified] LOCATION: [address] [place] [address]   MEDICAL RECORD #: [id number] DATE OF BIRTH:

## 2017-12-01 NOTE — PLAN OF CARE
Drowsy, orientated to self and location. Fall risk , bed alarm on at all times. Npo excepts sips with meds . Voided x2 concentrated urine. Bp runs low . ProAmatine given early this am. Iv fluids continued. Abdomen distended. No BM overnight.  Sats 93% on ro

## 2017-12-01 NOTE — PHYSICAL THERAPY NOTE
PHYSICAL THERAPY EVALUATION - INPATIENT     Room Number: 424/424-A  Evaluation Date: 12/1/2017  Type of Evaluation: Initial  Physician Order: PT Eval and Treat    Presenting Problem: lethargy, AMS  Reason for Therapy: Mobility Dysfunction and Discharge [place]    HOME SITUATION  Type of Home: House   Home Layout: Two level        Stairs to Bedroom: 14  Railing: Yes    Lives With: Spouse;Daughter; Son     Patient Owned Equipment: None       Prior Level of [place]: Previously independent.   Pt with falls w have...  -   Turning over in bed (including adjusting bedclothes, sheets and blankets)?: A Lot   -   Sitting down on and standing up from a chair with arms (e.g., wheelchair, bedside commode, etc.): A Lot   -   Moving from lying on back to sitting on the s old female admitted on 11/30/2017 for AMS, lethargy. Pertinent comorbidities and personal factors impacting therapy include ovarian cancer, actively undergoing treatment.   In this PT evaluation, the patient presents with the following impairments decrease established on 12/1/2017

## 2017-12-01 NOTE — PROGRESS NOTES
Met again  With pt, mother and father in room . Pt very lethargic. I spoke to mom alone. Mom has been very involved in care, has accompanied pt to each appt. I have never met  and he has not been that involved.  [facility] medical POA as pt too lethargic

## 2017-12-01 NOTE — PAYOR COMM NOTE
--------------  ADMISSION REVIEW       11/30    ED LATE    Patient presents with:  Abnormal Result   Altered Mental Status      Stated Complaint: abnormal labs, lethargy     HPI     50-year-old woman who has advanced ovarian cancer with carcinomatosis and normal limits   ARTERIAL BLOOD GAS - Abnormal; Notable for the following:      ABG pO2 65 (*)       ABG O2 Saturation 91 (*)       Total Hemoglobin 7.4 (*)       All other components within normal limits   CBC W/ DIFFERENTIAL - Abnormal; Notable for the fo

## 2017-12-01 NOTE — PLAN OF CARE
Dr Mati Estrada and Dr Manuel Pickett notified of new consults , will see patient in am. BMP ordered for am

## 2017-12-01 NOTE — SLP NOTE
ADULT SWALLOWING EVALUATION    ASSESSMENT    ASSESSMENT/OVERALL IMPRESSION:  Pt seen this PM for bedside swallow evaluation. Pt admitted to hospital due to AMS.  Pt recently discharged from this hospital 11/28/17 due to ascites requiring paracentesis and ur study  Discharge Recommendations/Plan: Undetermined    HISTORY   MEDICAL HISTORY  Reason for Referral: R/O aspiration (coughing with thin liquids)    Problem List  Principal Problem:    Carcinomatosis ([address]  Active Problems:    Ovarian cancer, unspecified EXAMINATION  Dentition: Natural;Functional  Symmetry: Within Functional Limits  Strength:  (reduced)  Tone:  (reduced)  Range of Motion:  (reduced)  Rate of Motion: Reduced    Voice Quality: Weak;Aphonic  Respiratory Status: Unlabored  Consistencies Triale

## 2017-12-01 NOTE — CONSULTS
[facility]  Report of Consultation    Obadiah Splinter Patient Status:  Inpatient    3/10/1965 MRN [id number]   [facility] 4NW-A Attending Vivi Knox MD   Hosp Day # 1 PCP Radha Stiles DO     Reason for Consultation:    Prakash Mcdonald Dx at age 29   • Visual impairment      Past Surgical History:  :  DELIVERY ONLY  10-2-14: COLONOSCOPY,DIAGNOSTIC      Comment: Dr. Denton Mccarty  No date: TOTAL ABDOM HYSTERECTOMY  10-2-14: UPPER GI ENDOSCOPY,DIAGNOSIS      Comment: Dr. Yohannes Bob 11/30/2017   HCT 27.6 11/30/2017   .0 11/30/2017   CREATSERUM 0.76 12/01/2017   BUN 43 12/01/2017    12/01/2017   K 5.1 12/01/2017    12/01/2017   CO2 21.0 12/01/2017    12/01/2017   CA 7.6 12/01/2017   ALB 1.4 11/30/2017   ALKPHO abdominal Pleurx at this time. # Advanced ovarian ca: Recent rapid progression.   Olaparib stopped 4 weeks ago at [facility] request as she was enrolled in clinical trial.  Unfortunately when she called them this Monday, they admitted to not having completed

## 2017-12-02 NOTE — PROGRESS NOTES
[facility]  Progress Note     Chacorta Islas Patient Status:  Inpatient    3/10/1965 MRN [id number]   [facility] 4NW-A Attending Yoandy Miller MD   Hosp Day # 2 PCP Mario Guerrero DO     Chief Complaint: AMS    S: No acute even cefTRIAXone  1 g Intravenous Q24H   • Fludrocortisone Acetate  0.1 mg Oral Daily   • enoxaparin  40 mg Subcutaneous Daily   • insulin detemir  12 Units Subcutaneous [email or web address]   • Insulin Aspart Pen  1-10 Units Subcutaneous TID AC and HS   • Insulin Asp

## 2017-12-02 NOTE — PROGRESS NOTES
[id number] [facility] Progress Note    Haritha Acuna Patient Status:  Inpatient    3/10/1965 MRN [id number]   [facility] 4NW-A Attending Tobi Govea MD   Hosp Day # 2 PCP Adelaide Herrera DO     Chief Complaint:   Follow up f PHOS 2.9 12/02/2017   PGLU 139 12/02/2017     Imaging / Diagnostic Testing:  EEG 12/1/2017  IMPRESSION:    This is a suboptimal routine EEG recording due to frequent muscle artifact.   From limited reading in the last part of recording, there is no epilep

## 2017-12-02 NOTE — VIDEO SWALLOW STUDY NOTE
ADULT VIDEOFLUOROSCOPIC SWALLOWING STUDY    Admission Date: 11/30/2017  Evaluation Date: 12/02/17  Radiologist: Dr. Verner Salines: Regular  Diet Recommendations - Liquid: Nectar thick    Consider ENT consult due to b Increasing chest congestion  Precautions: None  Imaging results:   CXR 11/30/17     Impression   CONCLUSION: Low lung volumes and rotation somewhat limits evaluation. Likely trace to small bilateral pleural effusions with associated atelectasis.          Re Trace; Transient  Tracheal Aspiration: None  Cough/Throat Clear Response: No           HARD SOLID  Oral Phase of Swallow (VFSS - Hard Solid): Impaired  Bolus Retrieval (VFSS - Hard Solid): Intact  Bilabial Seal (VFSS - Hard Solid):  Intact  Bolus Formation ( sips, No straws, Upright 90 degrees with min assistance 100 % of the time across 2 sessions. New goal   Goal #4 Pt will complete vocal cord adduction exercises x10 each given min cues with >95% success.    New goal   Goal #5 Pt will complete exercises to im

## 2017-12-02 NOTE — PLAN OF CARE
Impaired Activities of Daily Living    • Achieve highest/safest level of independence in self care Not Progressing          Impaired Functional Mobility    • Achieve highest/safest level of mobility/gait Progressing        Impaired Swallowing    • Minimize

## 2017-12-02 NOTE — PLAN OF CARE
GASTROINTESTINAL - ADULT    • Maintains or returns to baseline bowel function Progressing    • Maintains adequate nutritional intake (undernourished) Progressing        Pt. W/o c/o nausea. But appetite fair to poor.  Does not care for [place] consistency liq

## 2017-12-02 NOTE — PROGRESS NOTES
[facility]  Nephrology Progress Note    Chiloae Vanessa Attending:  Paula Garcia MD       Assessment and Plan:    1) Hyperkalemia- due to probable distal RTA- resolved with loop diuretics / higher dose lorinef / low K diet- CPM     2) Hyponatremia- 12/02/2017   BUN 41 12/02/2017    12/02/2017   K 4.7 12/02/2017    12/02/2017   CO2 25.0 12/02/2017    12/02/2017   CA 7.6 12/02/2017   MG 2.3 12/02/2017   PHOS 2.9 12/02/2017   PGLU 139 12/02/2017       Imaging:   All imaging studies rev

## 2017-12-02 NOTE — PROGRESS NOTES
Heme/Onc Progress Note - Valentin      Chief Complaint:    Follow up for advanced ovarian cancer. Interim History:      The patient is sitting up on the bedside. She is much more alert and she is oriented. She is calm without distress.  She has no compl negative. Neurology evaluation with EEG is negative for seizure. Urine does show bacteria and WBCs but culture is pending.     2. Advanced ovarian ca with recurrent ascites:     Recent rapid progression. Olaparib stopped 4 weeks.  She received Topotecan

## 2017-12-03 NOTE — PROGRESS NOTES
[facility]  Progress Note     Mariia Oreilly Patient Status:  Inpatient    3/10/1965 MRN [id number]   [facility] 4NW-A Attending Lincoln Rodriguez MD   Hosp Day # 3 PCP Esther Martinez DO     Chief Complaint: AMS    S: No acute even Epic.    Medications:   • famoTIDine  20 mg Oral BID   • amoxicillin  500 mg Oral Q8H   • Pantoprazole Sodium  40 mg Oral QAM AC   • Fludrocortisone Acetate  0.1 mg Oral Daily   • enoxaparin  40 mg Subcutaneous Daily   • insulin detemir  12 Units Subcutane

## 2017-12-03 NOTE — PROGRESS NOTES
Heme/Onc Progress Note - Valentin      Chief Complaint:    Follow up for advanced ovarian cancer. Interim History:      No new complaints today. She slept better last night. She continues to be alert and oriented x 3. She is calm without distress.  She ovarian ca with recurrent ascites:     Recent rapid progression. Olaparib stopped 4 weeks. She received Topotecan here on 11/28/17. Will continue to support through the weekend. Will be due for chemotherapy on Tuesday.  Depending on overall status decision

## 2017-12-03 NOTE — SLP NOTE
SPEECH DAILY NOTE - INPATIENT    ASSESSMENT & PLAN   ASSESSMENT  Pt seen for meal assess/dysphagia therapy to monitor po tolerance of recommended diet and ensure adherence to aspiration precautions. Pt alert and sitting upright in chair.   Sister at bedsid each given min cues with >95% success. In progress   Goal #5 Pt will complete exercises to improve hyolaryngeal elevation (falsetto and Mendelsohn) x10 each given min cues with 95% success.   DNT this session          FOLLOW UP  Follow Up Needed: Yes  SLP

## 2017-12-03 NOTE — PHYSICAL THERAPY NOTE
PHYSICAL THERAPY TREATMENT NOTE - INPATIENT    Room Number: 424/424-A     Session: 1   Number of Visits to Meet Established Goals: 5    Presenting Problem: lethargy, AMS    Problem List  Principal Problem:    Carcinomatosis ([address]  Active Problems:    Ovar +  Dynamic Sitting: Poor +           Static Standing: Poor -  Dynamic Standing: Poor -    ACTIVITY TOLERANCE  O2 Saturation: 94%  Room air  No shortness of breath    AM-PAC '6-Clicks' INPATIENT SHORT FORM - BASIC MOBILITY  How much difficulty does the beto EXERCISES  Lower Extremity Ankle pumps  Heel raises  Knee extension     Upper Extremity Elbow flex/ext and  - open/close     Position Sitting     Repetitions   10   Sets   1     Patient End of Session: Up in chair;Needs met;Call light within reach;RN a to/from Stand at assistance level: minimum assistance   Goal #3 Patient is able to ambulate 10 feet with assist device: walker - rolling at assistance level: moderate assistance   Goal #4     Goal #5     Goal #6     Goal Comments: Goals established on 12/1

## 2017-12-03 NOTE — PROGRESS NOTES
[facility]  Progress Note     Rishabh Oh Patient Status:  Inpatient    3/10/1965 MRN [id number]   [facility] [place] 4NW-A Attending Hussain Herron MD   Hosp Day # 3 PCP Nury Dean DO     Chief Complaint: AMS    S: No acute even hours.         Imaging: Imaging data reviewed in Epic.     Medications:   • famoTIDine  20 mg Oral BID   • amoxicillin  500 mg Oral Q8H   • Pantoprazole Sodium  40 mg Oral QAM AC   • Fludrocortisone Acetate  0.1 mg Oral Daily   • enoxaparin  40 mg Subcutane

## 2017-12-03 NOTE — CM/SW NOTE
Pt's mother Lizz Garrison requested to speak w/SW. SW met w/pt's mother who appeared very overwhelmed. She indicated the pt did not want to return to [facility]. SW informed them of in network facilities.  Since the pt is from [place], she stated she would like

## 2017-12-03 NOTE — PROGRESS NOTES
[facility]    Progress Note    Pilo Collazo Patient Status:  Inpatient    3/10/1965 MRN [id number]   [facility] [place] 4NW-A Attending Britney Milton MD   [address] Day # 3 PCP Shobha Titus,      Subjective:  Pilo Collazo is a(n) 50 Glucose-Vitamin C (DEX-4) 4-0.006 g chewable tab 4 tablet 4 tablet Oral Q15 Min PRN   Or      dextrose 50% injection 50 mL 50 mL Intravenous Q15 Min PRN   Or      glucose (DEX4) oral liquid 30 g 30 g Oral Q15 Min PRN   Or      Glucose-Vitamin C (DEX-4) 4 positive (HCC)     Insulin pump in place     Cellulitis of left lower extremity     Type 2 diabetes, uncontrolled, with cellulitis of foot ([address]     Benign essential HTN     Dyslipidemia     Malignant neoplasm of ovary (HCC)     Neuropathy     Cellulitis

## 2017-12-03 NOTE — PLAN OF CARE
MUSCULOSKELETAL - ADULT    • Return mobility to safest level of function Progressing          GASTROINTESTINAL - ADULT    • Maintains or returns to baseline bowel function Progressing    • Maintains adequate nutritional intake (undernourished) Progressing

## 2017-12-03 NOTE — PLAN OF CARE
Impaired Functional Mobility    • Achieve highest/safest level of mobility/gait Progressing        METABOLIC/FLUID AND ELECTROLYTES - ADULT    • Glucose maintained within prescribed range Progressing        MUSCULOSKELETAL - ADULT    • Return mobility to s

## 2017-12-03 NOTE — CHILD LIFE NOTE
[address] [place] [address] Provided to pt's children, patient, spouse, and extended family present    Services Provided at [facility] met with children in conference room    Services Provided for coping strategies for pt's children    Family's C to create a weekly meal plan with dad using mom's recipes. Most importantly for dad and daughter to eat dinner together. A memory journal was given to the children.  The goal of the journal is to have friends and family write stories in the journal that

## 2017-12-03 NOTE — PROGRESS NOTES
[facility]  Nephrology Progress Note    Renuka Brooks Attending:  Sharona Godinez MD       Assessment and Plan:    1) Hyperkalemia- due to probable distal RTA- resolved with loop diuretics / higher dose florinef / low K diet- CPM     2) Hyponatremia 25.1 12/03/2017   .0 12/03/2017   CREATSERUM 0.69 12/03/2017   BUN 46 12/03/2017    12/03/2017   K 4.9 12/03/2017    12/03/2017   CO2 25.0 12/03/2017    12/03/2017   CA 7.4 12/03/2017   MG 2.2 12/03/2017   PHOS 3.3 12/03/2017   PG

## 2017-12-04 NOTE — OCCUPATIONAL THERAPY NOTE
Attempted to see pt for OT. Per chart, pt with toe discoloration and arterial US pending. Will hold pending results.

## 2017-12-04 NOTE — SLP NOTE
SPEECH DAILY NOTE - INPATIENT    ASSESSMENT & PLAN   ASSESSMENT  Pt seen for dysphagia tx for further education re: dysphagia exercises. Pt received lying in bed, awake and alert. Pt reports good tolerance of general solids and nectar thick liquids.  Pt rep Small sips, No straws, Upright 90 degrees with min assistance 100 % of the time across 2 sessions.  In progress   Goal #4 Pt will complete vocal cord adduction exercises x10 each given min cues with >95% success.   In progress   Goal #5 Pt will complete exe

## 2017-12-04 NOTE — PALLIATIVE CARE NOTE
PCSW advised by [facility]Gwendolyn that the plan is for the pt to follow-up with the outpatient PC MARIBEL/Laurence Alberts at d/c; order has been placed and PC Laura Alberts has been notified. PCSW advised Unit PELON/Tess of above. PCSW to continue to follow.

## 2017-12-04 NOTE — PALLIATIVE CARE NOTE
[address] Follow Up      Tad Henderson  [id number]       Patient seen and evaluated, family (mother) at bedside. Concern for LE thrombosis. C/O pain to right foot. Discoloration noted to 5th digit. Edema to both feet.

## 2017-12-04 NOTE — PROGRESS NOTES
[facility]  Progress Note     Romayne Gemma Patient Status:  Inpatient    3/10/1965 MRN [id number]   [facility] 4NW-A Attending Shweta Gilmore MD   Hosp Day # 4 PCP Jovanny Cosme DO     Chief Complaint: AMS    S: Patient devel Oral Daily   • insulin detemir  12 Units Subcutaneous [email or web address]   • Insulin Aspart Pen  1-10 Units Subcutaneous TID AC and HS   • Insulin Aspart Pen  1-68 Units Subcutaneous TID CC   • furosemide  20 mg Oral BID (Diuretic)   • Midodrine HCl  5 mg Oral

## 2017-12-04 NOTE — PLAN OF CARE
METABOLIC/FLUID AND ELECTROLYTES - ADULT    • Glucose maintained within prescribed range Not Progressing          METABOLIC/FLUID AND ELECTROLYTES - ADULT    • Glucose maintained within prescribed range Not Progressing          GASTROINTESTINAL - ADULT

## 2017-12-04 NOTE — PHYSICAL THERAPY NOTE
IP PT on hold. Pt is pending US for pain and discoloration of toes. Will re-attempt as appropriate and as schedule permits.

## 2017-12-04 NOTE — TELEPHONE ENCOUNTER
Received fax from [facility] requesting copy of pt's admission note, treatment plan and d/c note/plan/needs.    Requested records from 11/18/17-11/28/17 admission faxed to [phone]

## 2017-12-04 NOTE — PAYOR COMM NOTE
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 Recurrent malignant ascites  1. Paracentesis not done, not enough fluid to drain  4. Metastatic ovarian cancer with rapid progression, oncology following, pt now DNR  5.  Hyponatremia, SIADH, cont lasix, r

## 2017-12-04 NOTE — PROGRESS NOTES
[facility]  Nephrology Progress Note    Mariana Rivera Attending:  Kaden Fontanez MD         Subjective:  No acute complains  Mother @ bedside      Current Facility-Administered Medications:  famoTIDine (PEPCID) tab 20 mg 20 mg Oral BID   amoxicilli Output              150 ml   Net               90 ml     Wt Readings from Last 3 Encounters:  12/03/17 : 208 lb 9.6 oz  11/28/17 : 206 lb  11/28/17 : 209 lb 9.6 oz    General: awake and alert; sitting up in bed  HEENT: No scleral icterus, MMM  Neck: Supp

## 2017-12-04 NOTE — CM/SW NOTE
SW followed up w/pt and pt's mother regarding dc plan. Pt stated she was doing better today. Informed pt and pt's daughter [facility] is in network w/their insurance.  PELON called Christian Hollins from [facility] regarding referral. Christian Hollins stated they are working on [facility]

## 2017-12-04 NOTE — PROGRESS NOTES
[facility]  Progress Note    Mariia Oreilly Patient Status:  Inpatient    3/10/1965 MRN [id number]   [facility] 4NW-A Attending Lincoln Rodriguez MD   [facility] Day # 4 PCP Esther Martinez,        SUBJECTIVE:    Better today, still weak.  Andra Lewis vaccine PF    PHYSICAL EXAM:    General: Patient is alert and oriented x 3, not in acute distress. Chest: Clear to auscultation. Heart: Regular rate and rhythm. Abdomen: Soft, non tender with good bowel sounds.   Extremities: cool, discoloration noted o

## 2017-12-05 NOTE — PROGRESS NOTES
[facility]  Progress Note     Syed Samuels Patient Status:  Inpatient    3/10/1965 MRN [id number]   [facility] 4NW-A Attending Cory Dominguez MD   Hosp Day # 5 PCP Bess Mcguire DO     Chief Complaint: AMS    S: Patient repor Fludrocortisone Acetate  0.1 mg Oral Daily   • Insulin Aspart Pen  1-10 Units Subcutaneous TID AC and HS   • Insulin Aspart Pen  1-68 Units Subcutaneous TID CC   • furosemide  20 mg Oral BID (Diuretic)   • Midodrine HCl  5 mg Oral TID       ASSESSMENT / PL

## 2017-12-05 NOTE — OCCUPATIONAL THERAPY NOTE
OCCUPATIONAL THERAPY TREATMENT NOTE - INPATIENT     Room Number: 432/432-A  Session: 1   Number of Visits to Meet Established Goals: 5    Presenting Problem: lethargy    History related to current admission: Pt admitted 11/30/2017 for Liver metastases ([facility] [facility] 2015   • Stress incontinence 2015    New problem    • Type 2 diabetes mellitus (HCC)     Dx at age 29   • Visual impairment        Past Surgical History  Past Surgical History:  :  DELIVERY ONLY  10-2-14: Nita Cazares Session: Needs met;Call light within reach;RN aware of session/findings; In bed; All patient questions and concerns addressed; Family present    ASSESSMENT   Pt is progressing slowly towards OT goals.  Pt presents with deficits in cognition, cardiopulmonary en Exercise Program Goal  Patient will be supervision with bilateral AROM HEP (home exercise program). -ongoing     Additional Goals:  Pt will tolerate 2 min static stand for participation in toileting task-ongoing

## 2017-12-05 NOTE — PLAN OF CARE
GASTROINTESTINAL - ADULT    • Maintains or returns to baseline bowel function Progressing    • Maintains adequate nutritional intake (undernourished) Progressing        GENITOURINARY - ADULT    • Absence of urinary retention Progressing        Impaired Act

## 2017-12-05 NOTE — PROGRESS NOTES
Assumed care at 1915. Per previous RN stat venous ultrasound of BLE and cardiology consult ordered, d/t abn arterial doppler result. Pt now down for ultrasound.

## 2017-12-05 NOTE — PROGRESS NOTES
[facility]  Nephrology Progress Note    Rogerio Cole Attending:  Norma Kitchen MD         Subjective:  No acute complains - improved PO intake   Mother @ bedside      Current Facility-Administered Medications:  insulin detemir (LEVEMIR) 100 UNIT/ Intake                0 ml   Output              980 ml   Net             -980 ml     Wt Readings from Last 3 Encounters:  12/03/17 : 208 lb 9.6 oz  11/28/17 : 206 lb  11/28/17 : 209 lb 9.6 oz    General: awake and alert   HEENT: No scleral icterus, MMM

## 2017-12-05 NOTE — CM/SW NOTE
SW confirmed with Chritsian Arch at [facility] ([phone]) they have insurance auth.

## 2017-12-05 NOTE — SLP NOTE
SPEECH DAILY NOTE - INPATIENT    ASSESSMENT & PLAN   ASSESSMENT  Pt seen for dysphagia tx to assess diet tolerance/safety, r/o aspiration and ensure that pt is f/u with safe diet recommendation.   Per VFSS results and recommendations of 12/2/17 pt jersona of aspiration with 95 % accuracy over 1 session(s). In Progress   Goal #2 The patient/family/caregiver will demonstrate understanding and implementation of aspiration precautions and swallow strategies independently over 1 session(s).     In [facility]

## 2017-12-05 NOTE — PROGRESS NOTES
[facility]  Progress Note    Ni Zhou Patient Status:  Inpatient    3/10/1965 MRN [id number]   [facility] 4NW-A Attending Sharon Terry MD   [facility] Day # 5 PCP Alejandra Duckworth,        SUBJECTIVE:    Better today, still weak. glucose **OR** Glucose-Vitamin C **OR** dextrose 50% **OR** glucose **OR** Glucose-Vitamin C, influenza virus vaccine PF    PHYSICAL EXAM:    General: Patient is alert and oriented x 3, not in acute distress. Chest: Clear to auscultation.   Heart: Regular

## 2017-12-05 NOTE — PLAN OF CARE
METABOLIC/FLUID AND ELECTROLYTES - ADULT    • Glucose maintained within prescribed range Not Progressing          GASTROINTESTINAL - ADULT    • Maintains or returns to baseline bowel function Progressing    • Maintains adequate nutritional intake (undernou

## 2017-12-05 NOTE — DISCHARGE SUMMARY
[facility] PSYCHIATRIC [place] HOSPITALIST  DISCHARGE SUMMARY     Dilia Obando Patient Status:  Inpatient    3/10/1965 MRN [id number]   [facility] 4NW-A Attending Anibal Yo MD   [facility] Day # 5 PCP Cas Elaine DO     Date of Admission: 2017  Date past medical history of metastatic ovarian CA, renal tubular acidosis, diabetes mellitus type 2, SIADH, neuropathy presents to the emergency department from skilled nursing facility with alteration in mental status and elevated potassium.   Patient was just reviewed options with patient. She declined angio and has decided on conservative measures with consultants. Patient is aware of risk of further necrosis, limb loss.   She continued on oral Lasix, increased Florinef per nephrology for hyperkalemia/BLE hussain Prescription details   acetaminophen 325 MG Tabs  Commonly known as:  TYLENOL      Take 650 mg by mouth every 6 (six) hours as needed for Pain. Refills:  0     atorvastatin 40 MG Tabs  Commonly known as:  LIPITOR      Take 40 mg by mouth nightly.    Refil COMPAZINE      Take 1 tablet (10 mg total) by mouth every 6 (six) hours as needed for Nausea. Quantity:  60 tablet  Refills:  5     TAB-A-JEANNIE Tabs      Take 1 tablet by mouth daily.    Refills:  0     Vitamin D3 1000 units Caps      Take 5,000 Units by m

## 2017-12-05 NOTE — PALLIATIVE CARE NOTE
[address] Follow Up      Jacob Luna  [id number]       Patient seen and evaluated, no family at bedside. Patient is up in chair. C/O pain to coccyx area and sore throat.   Reports coccyx pain is from sitting in chair fo

## 2017-12-05 NOTE — PHYSICAL THERAPY NOTE
PHYSICAL THERAPY TREATMENT NOTE - INPATIENT    Room Number: 424/424-A     Session: 2  Number of Visits to Meet Established Goals: 5    Presenting Problem: lethargy, AMS     History related to current admission: Pt was admitted from [address] [place] [address] on 11/30 ([facility]) 2015   • Stress incontinence 2015    New problem    • Type 2 diabetes mellitus (HCC)     Dx at age 29   • Visual impairment        Past Surgical History  Past Surgical History:  :  DELIVERY ONLY  10-2-14: Conner Cottrell Assistance: Not tested  Comment : above scores based on dept protocol    Skilled Therapy Provided:     Pt presented seated on EOB with family present upon PT arrival. Pt pt educated on fall precautions 2/2 +orthostatic hypotension. See BP above.    Pt perfo mechanics; Endurance; Energy conservation;Patient education; Family education;Gait training;Range of motion;Strengthening;Stair training;Balance training;Transfer training  Rehab Potential : Guarded  Frequency (Obs): 5x/week    CURRENT GOALS     Goal #1 M[place]

## 2017-12-05 NOTE — PLAN OF CARE
GENITOURINARY - ADULT    • Absence of urinary retention Progressing        Impaired Swallowing    • Minimize aspiration risk Progressing        METABOLIC/FLUID AND ELECTROLYTES - ADULT    • Glucose maintained within prescribed range Progressing        PAIN

## 2017-12-05 NOTE — CONSULTS
[facility]  Report of Consultation    Galilea Hare Patient Status:  Inpatient    3/10/1965 MRN [id number]   [facility] 4NW-A Attending Aviva Villeda MD   [facility] Day # 5 PCP Jennifer Jessica DO     Reason for Consultation:  Lie[employer] ENDOSCOPY,DIAGNOSIS      Comment: Dr. Cece Chung  Family History   Problem Relation Age of Onset   • Diabetes Father    • headaches Tamara Canny Mother       reports that she has never smoked.  She has never used smokeless tobacco. She reports that she does no °F (36.6 °C), temperature source Axillary, resp. rate 18, height 5' 3\" (1.6 m), weight 208 lb 9.6 oz (94.6 kg), last menstrual period 2014, SpO2 95 %, not currently breastfeeding.   Temp (24hrs), Av.9 °F (36.6 °C), Min:97.6 °F (36.4 °C), Max:98.1

## 2017-12-05 NOTE — CM/SW NOTE
PELON spoke with Darcy Carrasco at [facility] ([phone]) and confirmed insurance Rony Sheryl is still pending.

## 2017-12-05 NOTE — CM/SW NOTE
Per RN patient is ready for discharge today. [facility]  will transfer patient by BLS at 16:00 to [facility]. RN to call report to [phone].     Alma Delia Gregorio RN, [facility]/CM  [phone]

## 2017-12-05 NOTE — PALLIATIVE CARE NOTE
[facility] [address] [facility] Follow Up    Individuals present:  Pt    Coping Status:   __x___Coping with some difficulty      Discussion: PCSW met with pt and educated on PCSW role.  Pt appreciative of PCSW visit and verbalized unde

## 2017-12-06 NOTE — PROGRESS NOTES
Notes read. Patient seen/examined. No significant arterial/ venous occlusive disease with venous congestion feet - left worse than right. Neurologically intact.  In light of her diagnosis agree with Dr. Neda Branham and would observe

## 2017-12-06 NOTE — CONSULTS
[address]    PATIENT'S NAME: Janette Randle   ATTENDING PHYSICIAN[facility]: Polly Thomas M.D.    PATIENT ACCOUNT#:   [de-identified]    LOCATION:  [address] [place] [address]  MEDICAL RECORD #:   [id number]       DATE OF BIRTH:  0 chemotherapy or bleeding, from 132,000 down to 104,000. She has also had slight worsening of her renal status, with creatinine 1.01 and BUN 46. Duplex ultrasound shows evidence of acute deep vein thrombosis.   Duplex ultrasound arterial shows flow going

## 2017-12-06 NOTE — TELEPHONE ENCOUNTER
Mother called stating she is looking to set up appt to start chemo tomorrow. Scheduled made for PL, APN, Chemo tomorrow.  Okay per MD.

## 2017-12-07 NOTE — PROGRESS NOTES
CC:Patient presents with: Follow - Up: recent discharge from hospital to [place] in [place], [facility] for day 8 chemo assessment and post hospital discharge assessment.       HPI:   Suzanne Humphreys is a(n) 47year old female followed by Dr. Neda Branham for [facility] distress. She is in a wheelchair  HEENT: EOMs intact. PERRL. Oropharynx is clear. Neck: No JVD. No palpable lymphadenopathy. Neck is supple. Chest: Clear to auscultation. Heart: Regular rate and rhythm.    Abdomen: Soft, non tender with good bowel soun chemotherapy may help her to live longer. Decision making:complex     30   Minutes were spent with the patient and family,       90 % of that time was spent addressing the patients emotional well being, plan of care,  Reviewing lab/test results.   Diane

## 2017-12-07 NOTE — PROGRESS NOTES
Education Record    Learner:  Patient and Family Member    Disease / Diagnosis: ovarian ca    Barriers / Limitations:  None   Comments:    Method:  Brief focused and Discussion   Comments:    General Topics:  Medication, Side effects and symptom management

## 2017-12-08 PROBLEM — N28.9 RENAL INSUFFICIENCY: Status: ACTIVE | Noted: 2017-01-01

## 2017-12-08 PROBLEM — C56.9 MALIGNANT NEOPLASM OF OVARY, UNSPECIFIED LATERALITY (HCC): Status: ACTIVE | Noted: 2017-01-01

## 2017-12-08 PROBLEM — C56.9 OVARIAN CANCER (HCC): Status: ACTIVE | Noted: 2017-01-01

## 2017-12-08 PROBLEM — R18.0 MALIGNANT ASCITES: Status: ACTIVE | Noted: 2017-01-01

## 2017-12-08 PROBLEM — C78.7 LIVER METASTASES (HCC): Status: ACTIVE | Noted: 2017-01-01

## 2017-12-08 PROBLEM — Z66 DNR (DO NOT RESUSCITATE): Status: ACTIVE | Noted: 2017-01-01

## 2017-12-08 PROBLEM — C78.7 LIVER METASTASES: Status: ACTIVE | Noted: 2017-01-01

## 2017-12-08 PROBLEM — R40.0 SOMNOLENCE: Status: ACTIVE | Noted: 2017-01-01

## 2017-12-08 PROBLEM — D61.818 PANCYTOPENIA (HCC): Status: ACTIVE | Noted: 2017-01-01

## 2017-12-08 NOTE — CM/SW NOTE
Pt is known to SW from previous admission. Pt resting. SW spoke w/pt's mother who is the [employer] are ready for hospice. \" Pt's mother stated they want in hospital hospice.  SW sent referral to [facility] via [facility] and paged [facility] regarding

## 2017-12-08 NOTE — ED PROVIDER NOTES
Patient Seen in: [facility] Emergency Department    History   Patient presents with:  Altered Mental Status (neurologic)    Stated Complaint: Ams    HPI    Patient is a 45-year-old female who unable to provide significant history.   Patient has a his use: No                Review of Systems    Positive for stated complaint: Ams  Other systems are as noted in HPI. Constitutional and vital signs reviewed. All other systems reviewed and negative except as noted above.     Physical Exam   ED Triage Vi limits   POCT GLUCOSE - Abnormal; Notable for the following:     POC Glucose 150 (*)     All other components within normal limits   CBC W/ DIFFERENTIAL - Abnormal; Notable for the following:     WBC 2.7 (*)     RBC 3.28 (*)     HGB 7.0 (*)     HCT 24.1 (* GOLD     EKG    Rate, intervals and axes as noted on EKG Report.   Rate: 99  Rhythm: Sinus Rhythm  Reading: Normal sinus rhythm, no acute changes         Chest x-rayCONCLUSION:    Low lung volume.  Poor inspiration.  Stable bilateral basilar atelectasis and

## 2017-12-08 NOTE — ED INITIAL ASSESSMENT (HPI)
Pt presents to ed per [facility] from [place] [address]. Per EMS report pt was AOX$ per NH staff & now altered. Pt has ovarian ca with mets.

## 2017-12-08 NOTE — CONSULTS
[facility]  Report of Consultation    Rishabh Oh Patient Status:  Inpatient    3/10/1965 MRN [id number]   [facility] 4NW-A Attending Giovani Kim MD   Hosp Day # 0 PCP Geoffrey Freeman MD     Reason for Consultation:    Mental s hysterectomy and bilateral salpingo-oophorectomy ([facility]) 2015   • Stress incontinence 2015    New problem    • Type 2 diabetes mellitus (HCC)     Dx at age 29   • Visual impairment      Past Surgical History:  :  DELIVERY ONLY  10-2- Distended, ascites noted. Neurologic: Unable to examine.     Laboratory Data:      Lab Results  Component Value Date   WBC 2.7 12/08/2017   HGB 7.0 12/08/2017   HCT 24.1 12/08/2017   PLT 52.0 12/08/2017   CREATSERUM 1.54 12/08/2017   BUN 55 12/08/2017   NA

## 2017-12-08 NOTE — HOSPICE RN NOTE
Met with patients mother, father, and  who were all in agreement with admitting patient to hospice care. They stated that their goal at this time is to keep her comfortable is curing her is not possible.  Spoke to both Dr. Veronica Balderrama and Dr. Sincere Rivas and

## 2017-12-09 NOTE — PLAN OF CARE
Achieves stable or improved neurological status Not Progressing      Skin integrity remains intact Progressing        Drowsy,able to say few words when family was talking to her,in a soft weak voice. Comfortable. At 0400,keeps taking off her gown. Ativan 1 mg

## 2017-12-09 NOTE — H&P
[address]    PATIENT'S NAME: Janette Randle   ATTENDING PHYSICIAN: Adam Crawford M.D.    PATIENT ACCOUNT#:   [de-identified]    LOCATION:  [address] [place] [address]  MEDICAL RECORD #:   [id number]       YOB: 1965  ADMISSION DATE:       12/08/2017 abdominal hysterectomy, upper endoscopy. MEDICATIONS:  Lyrica, insulin, midodrine, fluticasone, Remeron, vitamin D3, Florinef. ALLERGIES:  No known drug allergies. FAMILY HISTORY:  Father with diabetes.     SOCIAL HISTORY:  No smoking, alcohol, catie hospice medical director. Continue to follow.     Dictated By Evelyn Parrish M.D.  d: 12/09/2017 10:41:14  t: 12/09/2017 11:10:43  [facility] [id number]  /

## 2017-12-09 NOTE — HOSPICE RN NOTE
General inpatient hospice day 2,   Seen by [facility] RN Fredi Oakes. Patients mother at bedside. Urinary retention now present, required straight cath overnight.  Order placed for hall catheter to be placed when /if needed to prevent need for freque

## 2017-12-09 NOTE — PLAN OF CARE
Assumed care @ 0730. Patient lethargic, responds to painful stimuli. Patient restless at times, trying to remove clothes. Morphine and Ativan given as needed, with good relief. Tilley catheter inserted, draining , grayish cloudy urine.

## 2017-12-09 NOTE — PLAN OF CARE
Achieves stable or improved neurological status Not Progressing    Admitted from ER with altered mental status. Has progressive ovarian cancer, no aggressive treatment per oncology.   Transitioned to hospice this evening with [facility].  Pt is groggy but

## 2017-12-10 NOTE — PLAN OF CARE
2010:Unresponsive,No Heart rate, no pulse, dilated pupil. Pronouced time of death with other RN Diana Hathaway at 2010. Family members at the bedside. Informed hospice RN and Dr Scott Ramos, pt candidate for eye donor.

## 2017-12-11 ENCOUNTER — TELEPHONE (OUTPATIENT)
Dept: HEMATOLOGY/ONCOLOGY | Facility: HOSPITAL | Age: 52
End: 2017-12-11

## 2017-12-11 NOTE — H&P
CHIEF COMPLAINT:  Hypotension, hyperkalemia, abdominal distention, metastatic ovarian cancer, mental status change.     HISTORY OF PRESENT ILLNESS:  The patient is a 64-year-old female who has metastatic ovarian cancer since January 2017 under care of  DNR.     REVIEW OF SYSTEMS:  Positive for above, otherwise unobtainable as patient is somewhat weak, lethargic, but seems comfortable.       PHYSICAL EXAMINATION:    GENERAL:  Condition poor, arousable, weak, lethargic.   HEENT:  Head:  Limited exam.  Eyes:

## 2017-12-11 NOTE — DISCHARGE SUMMARY
[facility]  Discharge Summary    Romayne Gemma Patient Status:  Inpatient    3/10/1965 MRN [id number]   [facility] 4NW-A Attending No att. providers found   Hosp Day # 1 PCP Lennox Ahr, MD     Date of Admission: 2017    Date unspecified laterality (HCC)     Thrombocytosis (HCC)     Hyperkalemia     Adjustment disorder with mixed anxiety and depressed mood     Anxiety     Hypotension     SIADH (syndrome of inappropriate ADH production) (HCC)     Carcinomatosis (HCC)     Altered

## 2017-12-12 NOTE — PAYOR COMM NOTE
--------------  ADMISSION REVIEW     Payor: Gilda Armando  #:  [id number]  Authorization Number: N/A    Admit date: 12/8/17  Admit time: 1519       Admitting Physician: Joanna Munoz MD  Attending Physician:  No att. providers found  Gina Salvador 73.5 (*)     MCH 21.3 (*)     MCHC 29.0 (*)     RDW 17.6 (*)     RDW-SD 47.0 (*)     Lymphocyte Absolute 0.17 (*)     Monocyte Absolute 0.05 (*)     All other components within normal limits   TROPONIN I - Normal   PROTHROMBIN TIME (PT) - Normal   PTT, ACT effusion.  Heart mostly obscured.  Vascularity obscured.              Disposition and Plan     Clinical Impression:  Malignant neoplasm of ovary, unspecified laterality ([facility] [address]  (primary encounter diagnosis)  Hyperkalemia  Renal insufficiency  Somnolence Orthostatic hypotension. She was on midodrine. 7.       Vitamin D3 deficient. She is she was on vitamin D.  At this point, home medications stopped per hospice and comfort care medications started per patient's family wishes as per hospice nurses' disc

## 2017-12-14 ENCOUNTER — APPOINTMENT (OUTPATIENT)
Dept: HEMATOLOGY/ONCOLOGY | Age: 52
End: 2017-12-14
Attending: INTERNAL MEDICINE
Payer: COMMERCIAL

## 2017-12-18 ENCOUNTER — APPOINTMENT (OUTPATIENT)
Dept: HEMATOLOGY/ONCOLOGY | Age: 52
End: 2017-12-18
Attending: INTERNAL MEDICINE
Payer: COMMERCIAL

## 2018-01-02 NOTE — TELEPHONE ENCOUNTER
- continue valcyte and bactrim for cmv/pcp prophylaxis.      Pt needs an order for needles for her trulicity.  Please call sofia

## 2018-01-08 ENCOUNTER — APPOINTMENT (OUTPATIENT)
Dept: HEMATOLOGY/ONCOLOGY | Facility: HOSPITAL | Age: 53
End: 2018-01-08
Attending: INTERNAL MEDICINE
Payer: COMMERCIAL

## 2018-01-29 ENCOUNTER — TELEPHONE (OUTPATIENT)
Dept: HEMATOLOGY/ONCOLOGY | Facility: HOSPITAL | Age: 53
End: 2018-01-29

## 2018-04-12 NOTE — PROGRESS NOTES
Education Record    Learner:  Patient    Disease / Citlaly Rodriguez cancer    Barriers / Limitations:  None   Comments:    Method:  Reinforcement   Comments:    General Topics:  Side effects and symptom management and Plan of care reviewed   Comments: Benzoyl Peroxide Pregnancy And Lactation Text: This medication is Pregnancy Category C. It is unknown if benzoyl peroxide is excreted in breast milk. Detail Level: Detailed Erythromycin Counseling:  I discussed with the patient the risks of erythromycin including but not limited to GI upset, allergic reaction, drug rash, diarrhea, increase in liver enzymes, and yeast infections. Minocycline Pregnancy And Lactation Text: This medication is Pregnancy Category D and not consider safe during pregnancy. It is also excreted in breast milk. High Dose Vitamin A Pregnancy And Lactation Text: High dose vitamin A therapy is contraindicated during pregnancy and breast feeding. Use Enhanced Medication Counseling?: No Isotretinoin Pregnancy And Lactation Text: This medication is Pregnancy Category X and is considered extremely dangerous during pregnancy. It is unknown if it is excreted in breast milk. Topical Sulfur Applications Counseling: Topical Sulfur Counseling: Patient counseled that this medication may cause skin irritation or allergic reactions.  In the event of skin irritation, the patient was advised to reduce the amount of the drug applied or use it less frequently.   The patient verbalized understanding of the proper use and possible adverse effects of topical sulfur application.  All of the patient's questions and concerns were addressed. Bactrim Pregnancy And Lactation Text: This medication is Pregnancy Category D and is known to cause fetal risk.  It is also excreted in breast milk. Erythromycin Pregnancy And Lactation Text: This medication is Pregnancy Category B and is considered safe during pregnancy. It is also excreted in breast milk. Minocycline Counseling: Patient advised regarding possible photosensitivity and discoloration of the teeth, skin, lips, tongue and gums.  Patient instructed to avoid sunlight, if possible.  When exposed to sunlight, patients should wear protective clothing, sunglasses, and sunscreen.  The patient was instructed to call the office immediately if the following severe adverse effects occur:  hearing changes, easy bruising/bleeding, severe headache, or vision changes.  The patient verbalized understanding of the proper use and possible adverse effects of minocycline.  All of the patient's questions and concerns were addressed. Tazorac Pregnancy And Lactation Text: This medication is not safe during pregnancy. It is unknown if this medication is excreted in breast milk. Doxycycline Pregnancy And Lactation Text: This medication is Pregnancy Category D and not consider safe during pregnancy. It is also excreted in breast milk but is considered safe for shorter treatment courses. Birth Control Pills Pregnancy And Lactation Text: This medication should be avoided if pregnant and for the first 30 days post-partum. Spironolactone Counseling: Patient advised regarding risks of diarrhea, abdominal pain, hyperkalemia, birth defects (for female patients), liver toxicity and renal toxicity. The patient may need blood work to monitor liver and kidney function and potassium levels while on therapy. The patient verbalized understanding of the proper use and possible adverse effects of spironolactone.  All of the patient's questions and concerns were addressed. Topical Retinoid counseling:  Patient advised to apply a pea-sized amount only at bedtime and wait 30 minutes after washing their face before applying.  If too drying, patient may add a non-comedogenic moisturizer. The patient verbalized understanding of the proper use and possible adverse effects of retinoids.  All of the patient's questions and concerns were addressed. Detail Level: Generalized High Dose Vitamin A Counseling: Side effects reviewed, pt to contact office should one occur. Tazorac Counseling:  Patient advised that medication is irritating and drying.  Patient may need to apply sparingly and wash off after an hour before eventually leaving it on overnight.  The patient verbalized understanding of the proper use and possible adverse effects of tazorac.  All of the patient's questions and concerns were addressed. Tetracycline Counseling: Patient counseled regarding possible photosensitivity and increased risk for sunburn.  Patient instructed to avoid sunlight, if possible.  When exposed to sunlight, patients should wear protective clothing, sunglasses, and sunscreen.  The patient was instructed to call the office immediately if the following severe adverse effects occur:  hearing changes, easy bruising/bleeding, severe headache, or vision changes.  The patient verbalized understanding of the proper use and possible adverse effects of tetracycline.  All of the patient's questions and concerns were addressed. Patient understands to avoid pregnancy while on therapy due to potential birth defects. Doxycycline Counseling:  Patient counseled regarding possible photosensitivity and increased risk for sunburn.  Patient instructed to avoid sunlight, if possible.  When exposed to sunlight, patients should wear protective clothing, sunglasses, and sunscreen.  The patient was instructed to call the office immediately if the following severe adverse effects occur:  hearing changes, easy bruising/bleeding, severe headache, or vision changes.  The patient verbalized understanding of the proper use and possible adverse effects of doxycycline.  All of the patient's questions and concerns were addressed. Isotretinoin Counseling: Patient should get monthly blood tests, not donate blood, not drive at night if vision affected, not share medication, and not undergo elective surgery for 6 months after tx completed. Side effects reviewed, pt to contact office should one occur. Azithromycin Pregnancy And Lactation Text: This medication is considered safe during pregnancy and is also secreted in breast milk. Azithromycin Counseling:  I discussed with the patient the risks of azithromycin including but not limited to GI upset, allergic reaction, drug rash, diarrhea, and yeast infections. Dapsone Pregnancy And Lactation Text: This medication is Pregnancy Category C and is not considered safe during pregnancy or breast feeding. Benzoyl Peroxide Counseling: Patient counseled that medicine may cause skin irritation and bleach clothing.  In the event of skin irritation, the patient was advised to reduce the amount of the drug applied or use it less frequently.   The patient verbalized understanding of the proper use and possible adverse effects of benzoyl peroxide.  All of the patient's questions and concerns were addressed. Spironolactone Pregnancy And Lactation Text: This medication can cause feminization of the male fetus and should be avoided during pregnancy. The active metabolite is also found in breast milk. Topical Sulfur Applications Pregnancy And Lactation Text: This medication is Pregnancy Category C and has an unknown safety profile during pregnancy. It is unknown if this topical medication is excreted in breast milk. Topical Retinoid Pregnancy And Lactation Text: This medication is Pregnancy Category C. It is unknown if this medication is excreted in breast milk. Topical Clindamycin Pregnancy And Lactation Text: This medication is Pregnancy Category B and is considered safe during pregnancy. It is unknown if it is excreted in breast milk. Dapsone Counseling: I discussed with the patient the risks of dapsone including but not limited to hemolytic anemia, agranulocytosis, rashes, methemoglobinemia, kidney failure, peripheral neuropathy, headaches, GI upset, and liver toxicity.  Patients who start dapsone require monitoring including baseline LFTs and weekly CBCs for the first month, then every month thereafter.  The patient verbalized understanding of the proper use and possible adverse effects of dapsone.  All of the patient's questions and concerns were addressed. Birth Control Pills Counseling: Birth Control Pill Counseling: I discussed with the patient the potential side effects of OCPs including but not limited to increased risk of stroke, heart attack, thrombophlebitis, deep venous thrombosis, hepatic adenomas, breast changes, GI upset, headaches, and depression.  The patient verbalized understanding of the proper use and possible adverse effects of OCPs. All of the patient's questions and concerns were addressed. Topical Clindamycin Counseling: Patient counseled that this medication may cause skin irritation or allergic reactions.  In the event of skin irritation, the patient was advised to reduce the amount of the drug applied or use it less frequently.   The patient verbalized understanding of the proper use and possible adverse effects of clindamycin.  All of the patient's questions and concerns were addressed. Bactrim Counseling:  I discussed with the patient the risks of sulfa antibiotics including but not limited to GI upset, allergic reaction, drug rash, diarrhea, dizziness, photosensitivity, and yeast infections.  Rarely, more serious reactions can occur including but not limited to aplastic anemia, agranulocytosis, methemoglobinemia, blood dyscrasias, liver or kidney failure, lung infiltrates or desquamative/blistering drug rashes.

## 2021-11-21 NOTE — TELEPHONE ENCOUNTER
Pt  or mom coming in for a sample of trulicity with instructions. Will be placed in fridge with name and .
independent

## 2022-07-20 NOTE — PLAN OF CARE
Patient Instructions from Today's Visit    Reason for Visit:  New patient appointment    Plan:  The nodule in our lung has been there for years so it is most likely benign. We will do one more CT scan     Follow Up: Follow up     Recent Lab Results:      Treatment Summary has been discussed and given to patient: n/a        -------------------------------------------------------------------------------------------------------------------  Please call our office at [phone] if you have any  of the following symptoms:   Fever of 100.5 or greater  Chills  Shortness of breath  Swelling or pain in one leg    After office hours an answering service is available and will contact a provider for emergencies or if you are experiencing any of the above symptoms. Patient did express an interest in My Chart. My Chart log in information explained on the after visit summary printout at the [address] desk.     Bridger Ji RN Problem: Impaired Activities of Daily Living  Goal: Achieve highest/safest level of independence in self care  Interventions:  - Assess ability and encourage patient to participate in ADLs to maximize function  - Promote sitting position while performing A

## 2023-12-13 ENCOUNTER — MED REC SCAN ONLY (OUTPATIENT)
Dept: FAMILY MEDICINE CLINIC | Facility: CLINIC | Age: 58
End: 2023-12-13

## 2023-12-13 ENCOUNTER — TELEPHONE (OUTPATIENT)
Dept: FAMILY MEDICINE CLINIC | Facility: CLINIC | Age: 58
End: 2023-12-13

## 2023-12-13 NOTE — TELEPHONE ENCOUNTER
Received fax on 12/13/2023 requesting medical records. Requesting medical records for all physical evidence, and medical and billing. Name:[employer]    Address: [address] [place] [address] [address] [place] [phone]  [facility]: [phone]  YAD:[id number]    Original sent to Scan STAT, copy sent to medical records.  Red Tag # [id number]

## 2024-08-20 NOTE — CM/SW NOTE
Current Status:  Type 2 diabetes with modest improvement but still in poor control, hemoglobin A1c now 8.0% down from 8.4%  Hypertension well-controlled  No new focal neurological symptoms  History of prostate cancer followed by urology with surveillance up-to-date    Health Maintenance Recommendations:  Influenza immunization recommended every fall  Shingrix immunization #2  Diabetic eye exam  Colorectal cancer screening due April 2031    Plan:  Continue metformin  mg twice daily with food and Farxiga 10 mg daily  Begin Rybelsus 3 mg daily and after 1 month increase to 7 mg daily-call back if unable to fill the prescription  Continue atorvastatin 40 mg daily, lisinopril 40 mg daily and famotidine 20 mg twice daily  Avoid dietary salt, starch and sugar and as much as possible follow program of regular aerobic exercise.  Return for follow-up with an in office hemoglobin A1c in 3 months or sooner with any problems  Please always arrive at least 15 minutes before your scheduled appointment time.   [facility] [place] auth. Updated RN.

## (undated) DEVICE — GLOVE SURG SENSICARE SZ 7

## (undated) DEVICE — OPEN-END FLEXI-TIP URETERAL CATHETER: Brand: FLEXI-TIP

## (undated) DEVICE — KENDALL SCD EXPRESS SLEEVES, KNEE LENGTH, MEDIUM: Brand: KENDALL SCD

## (undated) DEVICE — SOL H2O 1000ML BTL

## (undated) DEVICE — ZIPWIRE GUIDEWIRE .035X150 STR

## (undated) DEVICE — Device

## (undated) DEVICE — CYSTO CDS-LF: Brand: MEDLINE INDUSTRIES, INC.

## (undated) NOTE — MR AVS SNAPSHOT
After Visit Summary   2/20/2017    Suzanne Finders    MRN: [id number]           Diagnoses this Visit     Liver metastases [facility])    -  Primary     Peritoneal metastases ([facility]         [facility], [facility]           Allergies WITH PLATELET.   Procedure                               Abnormality         Status                     ---------                               -----------         ------                     CBC W/ DIFFERENTIAL[[id number]]          Abnormal            Final use the following correction formula. Corrected Calcium Formula:      ((4.0 - Albumin) x 0.8 + Calcium    Note: Calculation is only valid when Albumin is less than 4.0g/dL.       Alkaline Phosphatase 107 (H)   U/L Final    AST 25  15-41  U/L Negrita

## (undated) NOTE — IP AVS SNAPSHOT
[address]/[facility]            (For Outpatient Use Only) Initial Admit Date: 11/18/2017   Inpt/Obs Admit Date: Inpt: 11/18/17 / Obs: N/A   Discharge Date:    Akila Horta:  [de-identified]   MRN: [de-identified]   CSN: [id number]        [id number]  [facility] Hospital Account Financial Class: Managed Care    November 28, 2017

## (undated) NOTE — MR AVS SNAPSHOT
After Visit Summary   1/23/2017    Gaurang Douglas    MRN: [id number]           Diagnoses this Visit     Liver metastases [facility])    -  Primary     Peritoneal metastases ([facility]         Ovarian cancer, unspecified laterality         Diabetic peripheral is a one-on-one appointment with a certified diabetes educator.   An assessment will be performed and will identify the pump candidate's ability to: Perform label reading Identify portion sizes Use insulin to carbohydrate ratio/correction factor accurately Appointment with Mary Alexis at [address], [place] ([phone]) [address] QVNVV [facility], [address] [address]            Result Summary for CBC WITH DIFFERENTIAL WITH PLATELET      Narrative     The following orde HGB 11.4 (L) 12.0-16.0  g/dL Final    HCT 35.9  34.0-50.0  % Final    .0  150.0-450.0  10(3)uL Final    MCV 79.4 (L) 81.0-100.0  fL Final    MCH 25.2 (L) 27.0-33.2  pg Final    MCHC 31.8  31.0-37.0  g/dL Final    RDW 21.8 (H) 11.5-16.0  % Final

## (undated) NOTE — Clinical Note
Great news today A1C at 7.7%! Down 6 lb. To f/u with Macrina in next 6-7 weeks and me in 3 months, hopefully the use of dexcom will further improve her A1C.

## (undated) NOTE — IP AVS SNAPSHOT
Patient Demographics     Address  [place] [address]  [place] [address] Phone  [phone] [facility]  [phone] [facility] E-mail Address  [email or web address]      Emergency Contact(s)     Name Relation Home Work Mobile    Mike Duran Spouse   [phone] Commonly known as:  VITAMIN D3  Next dose due:  11/29 am      Take 5,000 Units by mouth daily. Fludrocortisone Acetate 0.1 MG Tabs  Commonly known as:  FLORINEF  Next dose due:  11/29 am      Take 0.5 tablets (0.05 mg total) by mouth daily.    Britt Ward Uriah Thompson DO         Prochlorperazine Maleate 10 mg tablet  Commonly known as:  COMPAZINE      Take 1 tablet (10 mg total) by mouth every 6 (six) hours as needed for Nausea.    Sharita Oliva MD         TAB-A-JEANNIE Tabs  Next dose due:  11/29 am [id number] Topotecan HCl (HYCAMTIN) 7.7 mg in sodium chloride 0.9 % 100 mL chemo-infusion 11/28/17 1710 New Bag      [id number] furosemide (LASIX) tab 20 mg 11/28/17 1640 Given      [id number] lidocaine-prilocaine (EMLA) cream 11/27/17 2203 Given      [id number] PT 14.1 12.0 - 14.3 seconds — [facility]   INR 1.09 0.89 - 1.11 — [facility]            Testing Performed By     Lab - Abbreviation Name Director Address Valid Date Range    [facility] 106 LAB Sasha Pickard  S.  [address] [facility] she started to have abd bloating and distension. This has been worsening since her discharge. +N/V. Non-billious, non-bloody. No fevers, no chills. abd pain has been increasing.       Past Medical History:  Past Medical History:   Diagnosis Date   • Ar atorvastatin 40 MG Oral Tab Take 40 mg by mouth nightly. Disp:  Rfl:    cholecalciferol 5000 units Oral Cap Take 5,000 Units by mouth daily.  Disp:  Rfl:    HYDROcodone-acetaminophen  MG Oral Tab Take 1/2-1 tablet BID as needed Disp: 60 tablet Rfl: 0 Abdomen: Soft, nontender,[FA.1] +distension[FA.2]. Positive bowel sounds. No rebound, guarding or organomegaly. Neurologic: No focal neurological deficits. CNII-XII grossly intact. Musculoskeletal: Moves all extremities.   Extremities:[FA.1] +LE edema[FA FA.3 Gilda Miranda MD on 11/19/2017 12:28 AM               H&P signed by Perla Pelayo MD at 11/19/2017 12:27 AM  Version 1 of 2    Author:  Perla Pelayo MD Service:  Hospitalist Author Type:  Physician    Filed:  11/19/2017 12:27 AM Date of Mike Su 10-2-14: COLONOSCOPY,DIAGNOSTIC      Comment: Dr. Corrine Oviedo  No date: TOTAL ABDOM HYSTERECTOMY  10-2-14: UPPER GI ENDOSCOPY,DIAGNOSIS      Comment: Dr. Corrine Oviedo    Social History:  reports that she has never smoked.  She has never used smokeless toba AREA(S) NIGHTLY (Patient taking differently: as needed. APPLY TO THE AFFECTED AREA(S) NIGHTLY ) Disp: 30 g Rfl: 1       Review of Systems:   A comprehensive 14 point review of systems was completed. Pertinent positives and negatives noted in the HPI. 3. Possible urinary tract infection  1. Urinalysis reviewed  2. Rocephin given in the emergency room  3. UA appears contaminated  4. Hypertension  5. Type 2 diabetes  1. Recent DKA  2. levemir at lower dose as NPO  6. Dyslipidemia    Dispo: paracentesis. Has been eating lot of citrus fruits.     BPs have been chronically low  Hyponatremia noted on labs  Finished tx for UTI    History:  Past Medical History:   Diagnosis Date   • Arrhythmia     heart mumur   • Ascites, malignant    • Bicuspid aortic valve 8/2 Units, Subcutaneous, TID CC and HS  •  Insulin Aspart Pen (NOVOLOG) 100 UNIT/ML flexpen 1-30 Units, 1-30 Units, Subcutaneous, TID CC  •  lidocaine-prilocaine (EMLA) cream, , Topical, BID  •  pregabalin (LYRICA) cap 300 mg, 300 mg, Oral, BID  •  Sodium Poly Musculoskeletal: Full range of motion of all extremities. 1-2+ edema to knees B  Integument: No lesions. No erythema. Psychiatric: Appropriate mood and affect.     Laboratory:    Lab Results  Component Value Date   WBC 23.1 11/22/2017   HGB 9.3 11/22/2017 Physical Therapy Notes (last 72 hours) (Notes from 11/25/2017  6:38 PM through 11/28/2017  6:38 PM)      Physical Therapy Note signed by Cassy Napoles PT at 11/27/2017  1:05 PM  Version 1 of 1    Author:  Cassy Napoles PT Service:  (none) Author Type • Ovarian cancer ([facility] 9/19/2014   • Secondary malignant neoplasm of liver [facility])    • Status post total abdominal hysterectomy and bilateral salpingo-oophorectomy (KATIE-BSO) 6/7/2015   • Stress incontinence 6/7/2015    New problem    • Type 2 diabetes sixto PT Approx Degree of Impairment Score: 54.16%   Standardized Score (AM-PAC Scale): 40.78   CMS Modifier (G-Code): CK[MD.2]    FUNCTIONAL ABILITY STATUS  Gait Assessment[MD.1]   Gait Assistance: Dependent assistance (actual min assist)  Distance (ft): 1, mar standing balance, dec strength. Pt will continue to benefit from IP skilled PT to address these deficits and achieve max level of mobility.  Continue to recommend KELSEY upon d/c from [address] in order to achieve mod I level in transfers, bed mobilities, household [address], [facility]

## (undated) NOTE — IP AVS SNAPSHOT
Patient Demographics     Address  [facility] [address] [address] Phone  [phone] [facility]  [phone] [facility] E-mail Address  [email or web address]      Emergency Contact(s)     Name Relation Home Work Mobile    Mike Duran Spouse   [phone] Start taking on:  7/26/2017      Take 1/2-1 tablet BID as needed   Alejandra Duckworth DO         Insulin Syringe-Needle U-100 31G X 5/16\" 1 ML Misc  Commonly known as:  BD INSULIN SYRINGE ULTRAFINE      Use as directed   Alejandra Duckworth DO         [facility] [id number] CeFAZolin Sodium (ANCEF/KEFZOL) IVPB premix 2 g 06/29/17 0843 New Bag      [id number] HYDROcodone-acetaminophen (NORCO)  MG per tab 1 tablet 06/28/17 1550 Given      [id number] HYDROcodone-acetaminophen (NORCO)  MG per tab 1 tablet 06/ Marily Fair [[id number]]  Resulted: 06/29/17 1143, Result status: Final result   Ordering provider:  Edmundo Ordonez MD  06/27/17 2300 Resulting lab:  [facility]    Specimen Information    Type Source Collected On   Blood — 06/28/17 0601 Mariia Dacreinier Patient Status:  Inpatient    3/10/1965 MRN [id number]   [facility] 5NW-A Attending Urmila Da Silva MD   Hosp Day # 0 PCP Esther Martinez DO     Chief Complaint:[GS.1] foot pain[GS.2]     History of Present Illness: [facility] Family History:   Family History   Problem Relation Age of Onset   • Diabetes Father    • headaches [OTHER] Mother        Allergies: No Known Allergies    Medications:    No current facility-administered medications on file prior to encounter.    Current Ou (Patient taking differently: Take 200 mg by mouth daily. Have not started only take if get yeast infection from antibiotics.   No yeast infection at this time. ) Disp: 7 tablet Rfl: 0   Insulin Syringe-Needle U-100 (BD INSULIN SYRINGE ULTRAFINE) 31G X 5/16\ Neurologic: No focal neurological deficits. CNII-XII grossly intact. Musculoskeletal: Moves all extremities.   Extremities:[GS.1] left foot with pus like collection,e rtyhema swelling and tenderness/ Erythema involves also forefoot and has lacy trailing to Author:  Earline Thakur MD Service:  Hospitalist Author Type:  Physician    Filed:  6/26/2017 11:32 PM Date of Service:  6/26/2017  7:03 PM Status:  Addendum    :  Earline Thakur MD (Physician)    Related Notes:  Addendum by Earline Thakur MD (Physi Past Surgical History: Past Surgical History:  :  DELIVERY ONLY  10-2-14: COLONOSCOPY,DIAGNOSTIC      Comment: Dr. Zana Mckeon  No date: INSULIN PUMP INITIATION  No date: TOTAL ABDOM HYSTERECTOMY  10-2-14: UPPER GI ENDOSCOPY,DIAGNOSIS      Com Cholecalciferol (VITAMIN D3) 1000 UNITS Oral Cap Take 5 tablets by mouth daily. Disp:  Rfl:    Multiple Vitamin (TAB-A-JEANNIE) Oral Tab Take 1 tablet by mouth daily.  Disp:  Rfl:    Dulaglutide (TRULICITY) 1.68 IM/6.9WG Subcutaneous Solution Pen-injector Inje HEENT: Normocephalic atraumatic. Moist mucous membranes. EOM-I. PERRLA. Anicteric. Neck: No lymphadenopathy. No JVD. No carotid bruits. Respiratory: Clear to auscultation bilaterally. No wheezes. No rhonchi.   Cardiovascular: S1, S2. Regular rate and rhyt GS.2 Annelise Griffin MD on 6/26/2017  7:54 PM  GS. 3 - Rik Melton MD on 6/26/2017 11:32 PM  GS. 4 - Rik Melton MD on 6/26/2017  7:06 PM               H&P signed by Rik Melton MD at 6/26/2017  7:57 PM  Version 1 of 3    Author:  Rik Melton MD • Type II or unspecified type diabetes mellitus without mention of complication, not stated as uncontrolled    • Unspecified essential hypertension    • Visual impairment         Past Surgical History: Past Surgical History:  :  DELIVERY ONLY MetFORMIN HCl 1000 MG Oral Tab Take 1 tablet (1,000 mg total) by mouth 2 (two) times daily with meals. Disp: 180 tablet Rfl: 1   Cholecalciferol (VITAMIN D3) 1000 UNITS Oral Cap Take 5 tablets by mouth daily.  Disp:  Rfl:    Multiple Vitamin (TAB-A-JEANNIE) Or (Oral)   Resp 18   Ht 5' 3\" (1.6 m)   Wt 203 lb 14.4 oz (92.5 kg)   LMP 09/20/2014   SpO2 96%   BMI 36.12 kg/m²   General: No acute distress. Alert and oriented x 3. HEENT: Normocephalic atraumatic. Moist mucous membranes. EOM-I. PERRLA. Anicteric.   Neck Paresh Henry MD  6/26/2017[GS.1]              Electronically signed by Esteban Lara MD on 6/26/2017  7:57 PM   Attribution Donnelly    GS. 1 - Esteban Lara MD on 6/26/2017  7:03 PM  GS. 2 - Esteban Lara MD on 6/26/2017  7:54 PM  GS. 3 - Esteban Lara MD on • Secondary malignant neoplasm of liver [facility])    • Status post total abdominal hysterectomy and bilateral salpingo-oophorectomy (KATIE-BSO) 6/7/2015   • Stress incontinence 6/7/2015    New problem    • Type II or unspecified type diabetes mellitus without me •  insulin aspart (NOVOLOG) 100 UNIT/ML flexpen 4-20 Units, 4-20 Units, Subcutaneous, TID CC and HS  •  morphINE sulfate (PF) 2 MG/ML injection 1 mg, 1 mg, Intravenous, Q4H PRN    Review of Systems:    A comprehensive 10 point review of systems was complet Peritoneal metastases ([facility]     Type 2 diabetes mellitus with both eyes affected by moderate nonproliferative retinopathy without macular edema, with long-term current use of insulin (HCC)     BRCA1 positive     Ovarian cancer, BRCA1 positive ([facility] 7/17/2017 3:00 PM [facility] [address]    9/11/2017 10:00 AM Kimberly Villarreal DO [facility] [place]

## (undated) NOTE — MR AVS SNAPSHOT
[facility],[address], [address]  [phone]               Thank you for choosing us for your health care visit with Aston Bright NP.   We are glad to serve you and happy to provide you with this 122/60 mmHg 68 98.3 °F (36.8 °C) (Oral) 63\" 228 lb 40.40 kg/m2    Breastfeeding?                    No              Current Medications          This list is accurate as of: 2/13/17  2:15 PM.  Always use your most recent med list.                Aldo Ganser Take 1 tablet by mouth daily. Vitamin D3 1000 units Caps   Take 5 tablets by mouth daily. Where to Get Your Medications      Information about where to get these medications is not yet available     !  Ask your nurse or doctor about

## (undated) NOTE — MR AVS SNAPSHOT
After Visit Summary   3/20/2017    Riccardo Georges    MRN: [id number]           Diagnoses this Visit     BRCA1 positive    -  Primary     Liver metastases ([facility]         Ovarian cancer, unspecified laterality ([facility]         Insulin pump in place

## (undated) NOTE — Clinical Note
BG improving, resumed GLP1 low dose having a lot of technical difficulties with her glucose monitor and have to involve dexcom none of us can fix it. But at least she is getting better. Will keep you posted. ..  Thanks

## (undated) NOTE — MR AVS SNAPSHOT
After Visit Summary   2/6/2017    Rogerio Cole    MRN: [id number]           Diagnoses this Visit     Ovarian cancer [facility])    -  Primary       Allergies     No Known Allergies      Your Vital Signs Were     Last Period Smoking Status

## (undated) NOTE — MR AVS SNAPSHOT
After Visit Summary   1/23/2017    Agustina Valenzuela    MRN: [id number]           Diagnoses this Visit     Liver metastases [facility])    -  Primary     Peritoneal metastases ([facility]         Ovarian cancer, unspecified laterality           Allergies     No assessment will be performed and will identify the pump candidate's ability to: Perform label reading Identify portion sizes Use insulin to carbohydrate ratio/correction factor accurately Accurately treat hypoglycemia using the Rule of 15  Following the as [address] [facility] [address]       Monday February 13, 2017 4:00 PM     Appointment with Marina Necessary at [address] [place] ([phone])   [address] [address]  [address]       Monday F

## (undated) NOTE — IP AVS SNAPSHOT
[facility] [place] [address] [place], [address] ~ [phone]                Discharge Summary   6/21/2017    Anson Dodson           Admission Information        Provider Department    6/21/2017 Anuj Goel MD  5nw-A         T Test 4-6 times daily    Jose Luna                        HYDROcodone-acetaminophen  MG Tabs   Last time this was given:  1 tablet on 6/22/2017 12:19 PM   Commonly known as:  Porsha Mcconnell   Start taking on:  6/26/2017        Take 1/2-1 tablet BID as Take 1 capsule (100 mg total) by mouth 3 (three) times daily.     Luana Patton                                 Prochlorperazine Maleate 10 mg tablet   Commonly known as:  COMPAZINE        Take 1 tablet (10 mg total) by mouth every 6 (six) hours as need 36.1 (06/22/17)  83.6 -- -- -- (06/22/17)  181.0 --    (06/21/17)  18.6 (H) (06/21/17)  4.37 (06/21/17)  12.2 (06/21/17)  36.2 (06/21/17)  82.8    (06/21/17)  220.0     (05/22/17)  9.5 (05/22/17)  4.40 (05/22/17)  12.4 (05/22/17)  35.9 (05/22/17)  81.6 - If you are a smoker or have smoked in the last 12 months, we encourage you to explore options for quitting.     - If you have concerns related to behavioral health issues or thoughts of harming yourself, contact [facility] a Most common side effects: Dizziness, constipation, abnormal liver function   What to report to your healthcare team:  Dizziness, muscle aches, constipation           Blood Sugar Medications     Empagliflozin (JARDIANCE) 10 MG Oral Tab    Dulaglutide (TRULI Use: Treat conditions such as depression and thought disorders   Most common side effects: Dizziness, drowsiness, problems with movement   What to report to your healthcare team: Changes in thinking, talking or movement, drowsiness, shaking           A

## (undated) NOTE — MR AVS SNAPSHOT
[facility],[address], [address] [place] [address] Rd  [phone]               Thank you for choosing us for your health care visit with Odette Frankel, NP.   We are glad to serve you and happy to provide you with this This list is accurate as of: 3/6/17  1:48 PM.  Always use your most recent med list.                Amitriptyline HCl 150 MG Tabs   Take 1 tablet (150 mg total) by mouth nightly.    Commonly known as:  ELAVIL           Atorvastatin Calcium 40 MG Tabs   Take - when to take this  - additional instructions   Commonly known as:  LYRICA           Prochlorperazine Maleate 10 mg tablet   Take 1 tablet (10 mg total) by mouth every 6 (six) hours as needed for Nausea.    Commonly known as:  COMPAZINE           TAB-A-VIT

## (undated) NOTE — MR AVS SNAPSHOT
After Visit Summary   4/17/2017    Nidhi Mendez    MRN: [id number]           Diagnoses this Visit     Liver metastases [facility])    -  Primary     Ovarian cancer, unspecified laterality ([facility]         [facility]         Ovarian cance

## (undated) NOTE — LETTER
[facility]  [address] [facility], [address] [place] [address]    Consent for Operation    Date: __________________    Time: _______________    1.  I authorize the performance upon Agustina Valenzuela the following operation:    Procedure(s):  Cystoscopy, right r procedure has been videotaped, the surgeon will obtain the original videotape. The hospital will not be responsible for storage or maintenance of this tape.     6. For the purpose of advancing medical education, I consent to the admittance of observers to t STATEMENTS REQUIRING INSERTION OR COMPLETION WERE FILLED IN.     Signature of Patient:   ___________________________    When the patient is a minor or mentally incompetent to give consent:  Signature of person authorized to consent for patient: ____________ supplements, and pills I can buy without a prescription (including street drugs/illegal medications). Failure to inform my anesthesiologist about these medicines may increase my risk of anesthetic complications.   · If I am allergic to anything or have had Anesthesiologist Signature     Date   Time  I have discussed the procedure and information above with the patient (or patient’s representative) and answered their questions. The patient or their representative has agreed to have anesthesia services.     ___

## (undated) NOTE — MR AVS SNAPSHOT
[facility] Héctor JonesJesus mancini [facility]  [phone]               Thank you for choosing us for your health care visit with Yumiko Cope DO.   We are glad to serve you and happy to provide you with this s later than the day before your exam as you will be drinking contrast at home prior to your appointment. Contrast must be dispensed by a CT Technologist or nurse.  Generally you will not encounter a wait, however please be aware you may experience a wait of [phone]   [phone]            Jul 10, 2017  1:00 PM   Diabetes Pump follow up with Lidia Hewitt, NP   [address] ( [facility])    [address], [address] [phone] physician's office. At that time, you will be provided with any authorization numbers or be assured that none are required. You can then schedule your appointment.  Failure to obtain required authorization numbers can create reimbursement difficulties for y What changed:  Another medication with the same name was added. Make sure you understand how and when to take each.    Commonly known as:  [facility]   Start taking on:  6/26/2017           * HYDROcodone-acetaminophen  MG Tabs   Take 1/2-1 tablet BID as ne These medications were sent to [address] - [address] [facility], Ilene SCALES.  [address], [address] [facility]     Phone:  [phone]    - Insulin Syringe-Needle U-100 31G X 5/16\" 1 ML Misc      You can get these

## (undated) NOTE — MR AVS SNAPSHOT
[facility],[address], [address] [place] [address] Rd  [phone]               Thank you for choosing us for your health care visit with Margarita Romero NP.   We are glad to serve you and happy to provide you with this Assoc Dx:  Type 2 diabetes mellitus with both eyes affected by moderate nonproliferative retinopathy without macular edema, with long-term current use of insulin ([facility] [G60.1612, Z79.4]                 Follow-up Instructions     Return in about 3 months ( INJECT UP  UNITS PER DAY VIA PUMP   Commonly known as:  HUMALOG           LANTUS SOLOSTAR 100 UNIT/ML Sopn   Generic drug:  Insulin Glargine   38 units twice daily in an emergency in case of insulin pump failure           lidocaine-prilocaine 2.5-2.5

## (undated) NOTE — Clinical Note
Hasn't been in for 3 months, plan to stop jardiance and trulicity as she had mycotic issues on jardiance with current antibx rx, and titrating oral chemo which may trigger N/V.  So plan stay on metformin, if BG spike off jardiance, to start lantus 10 units

## (undated) NOTE — MR AVS SNAPSHOT
[facility],[address], [address] [place] [address] Rd  [phone]               Thank you for choosing us for your health care visit with Lucie Fisher NP.   We are glad to serve you and happy to provide you with this Plan: Hold trulicity during week before Chemo and if needed, after   When able to tolerate, try lower dose trulicity 1.12 mg weekly   All basal insulin doses lowered, continue metformin and jardiance.    Return in 2-3 weeks, call if any more lows or concern Generic drug:  Insulin Glargine   38 units twice daily in an emergency in case of insulin pump failure           lidocaine-prilocaine 2.5-2.5 % Crea   APPLY TO THE AFFECTED AREA(S) NIGHTLY   Commonly known as:  EMLA           MetFORMIN HCl 1000 MG Tabs   T

## (undated) NOTE — MR AVS SNAPSHOT
After Visit Summary   1/9/2017    Haritah Acuna    MRN: [id number]           Diagnoses this Visit     History of ovarian cancer    -  Primary     Ovarian [facility], unspecified laterality ([facility]         Chemotherapy adverse reaction, initial encou Please Note     If a lab draw is part of your appointment, please arrive 15 minutes early. Follow-up Instructions     Return for MD Jarrod,chemo 1/23/16.       To Do List     Monday January 09, 2017     LAB:  -II        Monday January 09, 2017 Comment:       Total Calciums are not corrected for effects of low albumin. If needed, use the following correction formula.       Corrected Calcium Formula:      ((4.0 - Albumin) x 0.8 + Calcium    Note: Calculation is only valid when Albumin is less than Platelet Morphology See morphology below (A) Normal   Final    Hypochromia Small (A) (none)   Final    Microcytosis Small (A) (none)   Final    Tear Drop Cells Small (A) (none)   Final    Large Platelets Present (A) (none)   Final               MyChart

## (undated) NOTE — IP AVS SNAPSHOT
[address]            (For Outpatient Use Only) Initial Admit Date: 11/30/2017   Inpt/Obs Admit Date: Inpt: 11/30/17 / Obs: N/A   Discharge Date:    Jessica Asencio:  [de-identified]   MRN: [de-identified]   CSN: [id number]        [facility] Account Financial Class: Managed Care    December 5, 2017

## (undated) NOTE — MR AVS SNAPSHOT
After Visit Summary   5/22/2017    Dorrine Half    MRN: [id number]           Diagnoses this Visit     Ovarian cancer, unspecified laterality ([facility]    -  Primary     Liver metastases ([facility]         Chemotherapy adverse reaction, subsequent encount concerns Monday through Friday 8:00 to 4:30. After hours or weekends for emergent needs [phone]         Please Note     If a lab draw is part of your appointment, please arrive 15 minutes early.       Follow-up Instructions     Return for Labs,MD 6-7

## (undated) NOTE — MR AVS SNAPSHOT
[facility] [address]  [place] [address] [phone]               Thank you for choosing us for your health care visit with Anthony Logan RN,CDE.   We are glad to serve you and happy to provide you with this summary Amitriptyline HCl 150 MG Tabs   Take 1 tablet (150 mg total) by mouth nightly. Commonly known as:  ELAVIL           Atorvastatin Calcium 40 MG Tabs   Take 1 tablet (40 mg total) by mouth nightly.    Commonly known as:  LIPITOR           Dapag medications prescribed for you. Read the directions carefully, and ask your doctor or other care provider to review them with you.             Critique[employer]It     Call the [employer] for assistance with your inactive [employer] account    If you have questions, you can

## (undated) NOTE — MR AVS SNAPSHOT
After Visit Summary   4/17/2017    Sari Cantu    MRN: [id number]           Diagnoses this Visit     Dehydration    -  Primary     Liver metastases [facility])         Ovarian cancer, unspecified laterality [facility])         Peritoneal metastases ([facility] Cholecalciferol (VITAMIN D3) 1000 UNITS Oral Cap Take 5 tablets by mouth daily. Multiple Vitamin (TAB-A-JEANNIE) Oral Tab Take 1 tablet by mouth daily.                 Patient Instructions     None      Please Note     If a lab draw is part of your appoint Total Calciums are not corrected for effects of low albumin. If needed, use the following correction formula. Corrected Calcium Formula:      ((4.0 - Albumin) x 0.8 + Calcium    Note: Calculation is only valid when Albumin is less than 4.0g/dL.

## (undated) NOTE — LETTER
08/02/17        [address] [address]      Dear Xander Christian,    [address] [facility] records indicate that you have outstanding lab work and or testing that was ordered for you and has not yet been completed:        A1C      LIPID PANEL

## (undated) NOTE — Clinical Note
a1c not much better which shocked me as her dexcom has shown much better trends, she is going to repeat it again in 3 weeks, she is going to [facility] stressed that whatever she needs to do for her Ca is most important and we will work around the meds s

## (undated) NOTE — MR AVS SNAPSHOT
[facility] [address]  [facility] [address]  [phone]               Thank you for choosing us for your health care visit with Dominique Cline RN,CDE.   We are glad to serve you and happy to provide you with this summary This list is accurate as of: 1/24/17  6:43 PM.  Always use your most recent med list.                Amitriptyline HCl 150 MG Tabs   Take 1 tablet (150 mg total) by mouth nightly.    Commonly known as:  ELAVIL           Atorvastatin Calcium 40 MG Tabs   [facility] TAB-A-JEANNIE Tabs   Take 1 tablet by mouth daily. Vitamin D3 1000 UNITS Caps   Take 5 tablets by mouth daily. * Notice: This list has 2 medication(s) that are the same as other medications prescribed for you.  Read the directions careful Don’t forget strength training with weights and resistance Set goals and track your progress   You don’t need to join a gym. Home exercises work great.  Put more priority on exercise in your life                    Visit [facility] online at

## (undated) NOTE — MR AVS SNAPSHOT
[facility] [address]  [facility] [address] [phone]               Thank you for choosing us for your health care visit with Betty Ugalde, RN,CDE.   We are glad to serve you and happy to provide you with this summary home prior to your appointment. Contrast must be dispensed by a CT Technologist or nurse. Generally you will not encounter a wait, however please be aware you may experience a wait of up to 15 minutes if technologists are helping other patients.   Oral con CHEMO INFUSION CHEMOTHERAPY with PF [address] in [facility] AND SURGICAL [facility])    [address]   [phone]            Apr 03, 2017  1:30 PM   Diabetes Pump follow up with Tk Chinchilla, Commonly known as:  HUMALOG           lidocaine-prilocaine 2.5-2.5 % Crea   APPLY TO THE AFFECTED AREA(S) NIGHTLY   Commonly known as:  EMLA           MetFORMIN HCl 1000 MG Tabs   Take 1 tablet (1,000 mg total) by mouth 2 (two) times daily with meals.    Co

## (undated) NOTE — MR AVS SNAPSHOT
After Visit Summary   3/1/2017    Matt Garnett    MRN: [id number]           Allergies     No Known Allergies      Your Vital Signs Were     BP Pulse Temp(Src) Resp    109/75 mmHg 99 98.5 °F (36.9 °C) 18    Height Weight BMI SpO2    1.6 m (5' 2.9

## (undated) NOTE — MR AVS SNAPSHOT
After Visit Summary   4/24/2017    Galilea Hare    MRN: [id number]           Diagnoses this Visit     Liver metastases [facility])    -  Primary     Ovarian cancer, unspecified laterality ([facility]         Peritoneal metastases ([facility]           Allergies LIDOCAINE-PRILOCAINE 2.5-2.5 % External Cream APPLY TO THE AFFECTED AREA(S) NIGHTLY    Glucose Blood (ABIMBOLA CONTOUR NEXT TEST) In Vitro Strip Test 4-6 times daily    Cholecalciferol (VITAMIN D3) 1000 UNITS Oral Cap Take 5 tablets by mouth daily.     Multip

## (undated) NOTE — IP AVS SNAPSHOT
[address]            (For Outpatient Use Only) Initial Admit Date: 6/26/2017   Inpt/Obs Admit Date: Inpt: 6/26/17 / Obs: N/A   Discharge Date:    Esau Donnelly:  [de-identified]   MRN: [de-identified]   CSN: [id number]        ENCOUNTER  Patient

## (undated) NOTE — IP AVS SNAPSHOT
Patient Demographics     Address  [address] [address]  [place] [address] Phone  [phone] [facility]  [phone] [facility] E-mail Address  [email or web address]      Emergency Contact(s)     Name Relation Home Work Mobile    Mike Duran Spouse   [phone] Take 1 tablet (0.1 mg total) by mouth daily. Higher dose per nephrology recommendations.   Stop taking on:  1/5/2018   KENNETH Starks         Fluticasone Propionate 50 MCG/ACT Susp  Commonly known as:  FLONASE      1 spray by Nasal route 2 (two) times TAB-A-JEANNIE Tabs      Take 1 tablet by mouth daily. Vitamin D3 1000 units Caps      Take 5,000 Units by mouth daily.                 Where to Get Your Medications      Please  your prescriptions at the location directed by your doctor or nurs [id number] Insulin Aspart Pen (NOVOLOG) 100 UNIT/ML flexpen 1-10 Units 12/04/17 1848 Given      [id number] Insulin Aspart Pen (NOVOLOG) 100 UNIT/ML flexpen 1-68 Units 12/05/17 1355 Given                    Recent Vital Signs    Flowsheet Row Most Recent Tiffanie Sodium 140 136 - 144 mmol/L — [facility]   Potassium 4.3 3.6 - 5.1 mmol/L [facility]   Chloride 107 101 - 111 mmol/L — [facility]   CO2 24.0 22.0 - 32.0 mmol/L [facility]            MAGNESIUM [[id number]] (Normal)  Resulted: 12/05/17 0718, Result status History of Present Illness: Gaurang Douglas is a 47year old female with past medical history of metastatic ovarian CA, renal tubular acidosis, diabetes mellitus type 2, SIADH, neuropathy presents to the emergency department from skilled nursing facility Past Surgical History: Past Surgical History:  :  DELIVERY ONLY  10-2-14: COLONOSCOPY,DIAGNOSTIC      Comment: Dr. Karsten Leonardo  No date: TOTAL ABDOM HYSTERECTOMY  10-2-14: UPPER GI ENDOSCOPY,DIAGNOSIS      Comment: Dr. Dan Najjar Ondansetron HCl (ZOFRAN) 8 MG tablet Take 1 tablet (8 mg total) by mouth every 8 (eight) hours as needed for Nausea.  Disp: 30 tablet Rfl: 3   Glucose Blood (ABIMBOLA CONTOUR NEXT TEST) In Vitro Strip Test 4-6 times daily Disp: 550 each Rfl: 3   Multiple Vitam TP  5.8*   --   5.8*       Estimated Creatinine Clearance: 62.6 mL/min (based on SCr of 0.88 mg/dL). Recent Labs   Lab  11/28/17   1039   PTP  14.1   INR  1.09       No results for input(s): TROP, CK in the last 72 hours.     Imaging: Imaging data review 1. IP Consult to Cardiology Once [[id number]] ordered by Saba Titus MD at 17 1935              S/[facility] Cardiology  Report of Consultation    Dago Dc Patient Status:  Inpatient    3/10/1965 MRN [id number]   [facility] 4NW 10-2-14: COLONOSCOPY,DIAGNOSTIC      Comment: Dr. Nico Collazo  No date: TOTAL ABDOM HYSTERECTOMY  10-2-14: UPPER GI ENDOSCOPY,DIAGNOSIS      Comment: Dr. Nico Collazo  Family History   Problem Relation Age of Onset   • Diabetes Father    • headaches Melody Garsia All systems were reviewed and are negative except as described above in HPI. Physical Exam:  Blood pressure 92/49, pulse 94, temperature 97.9 °F (36.6 °C), temperature source Axillary, resp.  rate 18, height 5' 3\" (1.6 m), weight 208 lb 9.6 oz (94.6 kg) AR.1 - Jessenia Guerra MD on 12/5/2017  9:37 AM                     D/C Summary     No notes of this type exist for this encounter.          Physical Therapy Notes (last 72 hours) (Notes from 12/2/2017  4:11 PM through 12/5/2017  4:11 PM)      Physical Past Medical History  Past Medical History:   Diagnosis Date   • Arrhythmia     heart mumur   • Ascites, malignant    • Bicuspid aortic valve 8/21/2017   • History of ovarian cancer 6/7/2015   • Hyperlipidemia LDL goal <100    • Hypertension    • Liver mas -   Moving from lying on back to sitting on the side of the bed?: A Lot   How much help from another person does the patient currently need. ..   -   Moving to and from a bed to a chair (including a wheelchair)?: A Lot   -   Need to walk in hospital room?: 11/18/17 for UTI, abd pain, ascites, and paracentesis 11/18. Pt with PMH of ovarian CA with liver mets) . Pt limited to EOB seated therex 2/2 awaiting vascular consult for activity orders and WB status for arterial US d/t discoloration of LE .    At [facility] re-attempt as appropriate and as schedule permits. [CY.1]      Attribution Donnelly    CY. 1 - Husam Arellano, PTA on 12/4/2017 12:00 PM               Physical Therapy Note signed by Oralia Francisco PTA at 12/3/2017  1:49 PM  Version 1 of 1    Author:  Jose Miguel Rasheed Comment: Dr. Amy Billings  No date: TOTAL ABDOM HYSTERECTOMY  10-2-14: UPPER GI ENDOSCOPY,DIAGNOSIS      Comment: Dr. Dusty Mon  \"I will try, but everything hurts\"    Patient’s self-stated goal is - unknown    OBJECTIVE[AR.1] Pt presented seated on EOB with family present upon PT arrival. Pt willing to participate in session, working towards increased functional mobility and independence. Pt performed sit<>stand to RW at [facility] assist for force generation.       Pt ambulated with functional mobility. Due to above deficits, Pt will benefit from continued IP PT, so that patient may achieve highest functional independence/return to baseline.  Recommend KELSEY upon [facility] d/c.       DISCHARGE RECOMMENDATIONS[AR.1]  PT Discharge R Ovarian cancer, unspecified laterality ([facility] [C56.9]  Very poor nutrition [E63.9]. Pt [facility] from [facility] secondary to lethargy. Recent admit to [address] [place] [address] 11/18/17 for UTI, abdominal pain, ascites, and paracentesis 11/18.  Pt with PMH of ovarian CA with liver me No date: TOTAL ABDOM HYSTERECTOMY  10-2-14: UPPER GI ENDOSCOPY,DIAGNOSIS      Comment: Dr. Prosper Mujica states \"I understand, but I don't like it\" when told recommendations for seated positioning    Patient self-stated goal is to go sai cognition, cardiopulmonary endurance, activity tolerance, balance, strength, coordination, functional reach, use of adaptive techniques impacting safety and independence in ADL/functional transfers. Pt with improvement in seated exercise.  Pt currently requ Attribution Donnelly    SH. 1 - Ra Can OT on 12/5/2017 10:46 AM               Occupational Therapy Note signed by Ra Can OT at 12/4/2017 11:44 AM  Version 1 of 1    Author:  Ra Can OT Service:  (none) Author Type:  Occupational Therapist    Fi Toxic metabolic encephalopathy    Goals of care, counseling/discussion    Palliative care encounter      Past Medical History  Past Medical History:   Diagnosis Date   • Arrhythmia     heart mumur   • Ascites, malignant    • Bicuspid aortic valve 8/21/20 Bolus Retrieval (VFSS - Thin Liquids): Intact  Bilabial Seal (VFSS - Thin Liquids): Intact  Bolus Formation (VFSS - Thin Liquids):  Intact  Bolus Propulsion (VFSS - Thin Liquids):  (delayed with prolonged oral hold)  Mastication (VFSS - Thin Liquids):  (n/a Results revealed mild oral and moderate pharyngeal dysphagia c/b increased LIZANDRO, premature spillage over BOT, delayed pharyngeal response with swallow triggering at the pyriforms for nectar and thin.   Reduced hyolaryngeal elevation with reduced epiglottic i Current status G Code: Initial, Swallowing, CK: 40-59% impaired, limited, or restricted  Goal status G Code: Swallowing, CK: 40-59% impaired, limited, or restricted    Thank you for your referral.   If you have any questions, please contact [facility] [place] provided with written exercises for latter to practice daily by pt.     Diet Recommendations - Solids: Regular  Diet Recommendations - Liquid: Nectar thick  / Thin liquid via spoon presentation with f/u aspiration precautions for pleasure feeds      Compens 12/19/2017 10:30 AM Chacha Bills, [address] [place] [address], [facility]    1/8/2018 4:00 PM MD Kaleigh Thomas [facility]

## (undated) NOTE — MR AVS SNAPSHOT
After Visit Summary   5/22/2017    Gaurang Douglas    MRN: [id number]           Diagnoses this Visit     Liver metastases [facility])    -  Primary     Ovarian cancer, unspecified laterality ([facility]         Per[facility] ([facility]           Allergies If a lab draw is part of your appointment, please arrive 15 minutes early.       To Do List     Monday May 22, 2017     LAB:  -II        Monday May 22, 2017     [facility] Internal:  CBC W/ DIFFERENTIAL        Monday May 22, 2017     LAB:  CBC WITH DIFFE Corrected Calcium Formula:      ((4.0 - Albumin) x 0.8 + Calcium    Note: Calculation is only valid when Albumin is less than 4.0g/dL.       Alkaline Phosphatase 124 (H)   U/L Final    AST 25  15-41  U/L Final    Alt 50  14-54  U/L Final    Bilirubi

## (undated) NOTE — MR AVS SNAPSHOT
After Visit Summary   2/20/2017    Tad Henderson    MRN: [id number]           Diagnoses this Visit     Liver metastases [facility])    -  Primary     Peritoneal metastases ([facility]         Ovarian cancer, unspecified laterality ([facility]           Allergies

## (undated) NOTE — Clinical Note
Her BG are mainly at goal with new regimen,less insulin and jardiance/trulicity,  watching closely as she had the blackout episode, don't feel related to this.  Will keep you posted, thanks again

## (undated) NOTE — Clinical Note
Thank you for your referral, she is very nice, gave her a lot of options to improve control. For now will retry GLP1 and SGLT2 and see if we can reduce her insulin, if not may try U200 insulin she is agreeable and will return in 3 weeks.  Will keep you post

## (undated) NOTE — Clinical Note
BG average now at 137 mg/dl, having a lot of nausea, asked to hold GLP1 during chemo. She has been on this for 3 months with no issue, but not sure if this is affecting her. Should I stop this when she goes to oral meds?  She has lost 10 lb with her current

## (undated) NOTE — MR AVS SNAPSHOT
After Visit Summary   3/20/2017    Kimberly Saunders    MRN: [id number]           Diagnoses this Visit     Liver metastases [facility])    -  Primary     Ovarian cancer, unspecified laterality ([facility]         [facility] ([facility]           Allergies ---------                               -----------         ------                     CBC W/ DIFFERENTIAL[[id number]]          Abnormal            Final result                 Please view results for these tests on the individual orders.          Result Sum Lymphocyte Absolute 1.08  0.90-4.00  x10(3) uL Final    Monocyte Absolute 0.48  0.10-0.60  x10(3) uL Final    Eosinophil Absolute 0.14  0.00-0.30  x10(3) uL Final    Basophil Absolute 0.03  0.00-0.10  x10(3) uL Final    Immature Granulocyte Absolute 0.03